# Patient Record
Sex: FEMALE | Race: WHITE | NOT HISPANIC OR LATINO | Employment: UNEMPLOYED | URBAN - METROPOLITAN AREA
[De-identification: names, ages, dates, MRNs, and addresses within clinical notes are randomized per-mention and may not be internally consistent; named-entity substitution may affect disease eponyms.]

---

## 2018-06-19 ENCOUNTER — HOSPITAL ENCOUNTER (EMERGENCY)
Facility: HOSPITAL | Age: 53
Discharge: HOME | End: 2018-06-20
Attending: EMERGENCY MEDICINE | Admitting: EMERGENCY MEDICINE

## 2018-06-19 DIAGNOSIS — N39.0 URINARY TRACT INFECTION IN FEMALE: Primary | ICD-10-CM

## 2018-06-19 DIAGNOSIS — R19.7 DIARRHEA, UNSPECIFIED TYPE: ICD-10-CM

## 2018-06-19 DIAGNOSIS — F20.9 SCHIZOPHRENIA, UNSPECIFIED TYPE (HCC): ICD-10-CM

## 2018-06-19 LAB
ALBUMIN SERPL-MCNC: 4.37 G/DL (ref 3.2–4.8)
ALBUMIN/GLOB SERPL: 1.5 G/DL (ref 1.5–2.5)
ALP SERPL-CCNC: 125 U/L (ref 25–100)
ALT SERPL W P-5'-P-CCNC: 17 U/L (ref 7–40)
ANION GAP SERPL CALCULATED.3IONS-SCNC: 9 MMOL/L (ref 3–11)
APAP SERPL-MCNC: <10 MCG/ML (ref 0–30)
AST SERPL-CCNC: 19 U/L (ref 0–33)
BASOPHILS # BLD AUTO: 0.06 10*3/MM3 (ref 0–0.2)
BASOPHILS NFR BLD AUTO: 0.4 % (ref 0–1)
BILIRUB SERPL-MCNC: 0.3 MG/DL (ref 0.3–1.2)
BUN BLD-MCNC: 15 MG/DL (ref 9–23)
BUN/CREAT SERPL: 16.7 (ref 7–25)
CALCIUM SPEC-SCNC: 9.5 MG/DL (ref 8.7–10.4)
CHLORIDE SERPL-SCNC: 107 MMOL/L (ref 99–109)
CO2 SERPL-SCNC: 24 MMOL/L (ref 20–31)
CREAT BLD-MCNC: 0.9 MG/DL (ref 0.6–1.3)
DEPRECATED RDW RBC AUTO: 43.9 FL (ref 37–54)
EOSINOPHIL # BLD AUTO: 0.13 10*3/MM3 (ref 0–0.3)
EOSINOPHIL NFR BLD AUTO: 0.8 % (ref 0–3)
ERYTHROCYTE [DISTWIDTH] IN BLOOD BY AUTOMATED COUNT: 13.4 % (ref 11.3–14.5)
ETHANOL BLD-MCNC: <10 MG/DL (ref 0–10)
GFR SERPL CREATININE-BSD FRML MDRD: 65 ML/MIN/1.73
GLOBULIN UR ELPH-MCNC: 2.8 GM/DL
GLUCOSE BLD-MCNC: 146 MG/DL (ref 70–100)
HCT VFR BLD AUTO: 42 % (ref 34.5–44)
HGB BLD-MCNC: 14 G/DL (ref 11.5–15.5)
IMM GRANULOCYTES # BLD: 0.04 10*3/MM3 (ref 0–0.03)
IMM GRANULOCYTES NFR BLD: 0.3 % (ref 0–0.6)
LIPASE SERPL-CCNC: 29 U/L (ref 6–51)
LYMPHOCYTES # BLD AUTO: 1.6 10*3/MM3 (ref 0.6–4.8)
LYMPHOCYTES NFR BLD AUTO: 10.4 % (ref 24–44)
MCH RBC QN AUTO: 29.7 PG (ref 27–31)
MCHC RBC AUTO-ENTMCNC: 33.3 G/DL (ref 32–36)
MCV RBC AUTO: 89 FL (ref 80–99)
MONOCYTES # BLD AUTO: 0.88 10*3/MM3 (ref 0–1)
MONOCYTES NFR BLD AUTO: 5.7 % (ref 0–12)
NEUTROPHILS # BLD AUTO: 12.67 10*3/MM3 (ref 1.5–8.3)
NEUTROPHILS NFR BLD AUTO: 82.4 % (ref 41–71)
PLATELET # BLD AUTO: 311 10*3/MM3 (ref 150–450)
PMV BLD AUTO: 10.2 FL (ref 6–12)
POTASSIUM BLD-SCNC: 3.8 MMOL/L (ref 3.5–5.5)
PROT SERPL-MCNC: 7.2 G/DL (ref 5.7–8.2)
RBC # BLD AUTO: 4.72 10*6/MM3 (ref 3.89–5.14)
SALICYLATES SERPL-MCNC: <1 MG/DL (ref 0–29)
SODIUM BLD-SCNC: 140 MMOL/L (ref 132–146)
WBC NRBC COR # BLD: 15.38 10*3/MM3 (ref 3.5–10.8)

## 2018-06-19 PROCEDURE — 80307 DRUG TEST PRSMV CHEM ANLYZR: CPT | Performed by: NURSE PRACTITIONER

## 2018-06-19 PROCEDURE — 85025 COMPLETE CBC W/AUTO DIFF WBC: CPT | Performed by: NURSE PRACTITIONER

## 2018-06-19 PROCEDURE — 96360 HYDRATION IV INFUSION INIT: CPT

## 2018-06-19 PROCEDURE — 80306 DRUG TEST PRSMV INSTRMNT: CPT | Performed by: NURSE PRACTITIONER

## 2018-06-19 PROCEDURE — 81001 URINALYSIS AUTO W/SCOPE: CPT | Performed by: NURSE PRACTITIONER

## 2018-06-19 PROCEDURE — 80053 COMPREHEN METABOLIC PANEL: CPT | Performed by: NURSE PRACTITIONER

## 2018-06-19 PROCEDURE — 99283 EMERGENCY DEPT VISIT LOW MDM: CPT

## 2018-06-19 PROCEDURE — 83690 ASSAY OF LIPASE: CPT | Performed by: NURSE PRACTITIONER

## 2018-06-19 RX ORDER — SODIUM CHLORIDE 0.9 % (FLUSH) 0.9 %
10 SYRINGE (ML) INJECTION AS NEEDED
Status: DISCONTINUED | OUTPATIENT
Start: 2018-06-19 | End: 2018-06-20 | Stop reason: HOSPADM

## 2018-06-19 RX ADMIN — SODIUM CHLORIDE 1000 ML: 9 INJECTION, SOLUTION INTRAVENOUS at 22:25

## 2018-06-20 VITALS
TEMPERATURE: 98.2 F | DIASTOLIC BLOOD PRESSURE: 58 MMHG | BODY MASS INDEX: 27.28 KG/M2 | OXYGEN SATURATION: 95 % | WEIGHT: 180 LBS | RESPIRATION RATE: 16 BRPM | HEART RATE: 78 BPM | SYSTOLIC BLOOD PRESSURE: 161 MMHG | HEIGHT: 68 IN

## 2018-06-20 LAB
AMPHET+METHAMPHET UR QL: NEGATIVE
AMPHETAMINES UR QL: NEGATIVE
BACTERIA UR QL AUTO: ABNORMAL /HPF
BARBITURATES UR QL SCN: NEGATIVE
BENZODIAZ UR QL SCN: NEGATIVE
BILIRUB UR QL STRIP: ABNORMAL
BUPRENORPHINE SERPL-MCNC: NEGATIVE NG/ML
CANNABINOIDS SERPL QL: NEGATIVE
CLARITY UR: ABNORMAL
COCAINE UR QL: NEGATIVE
COLOR UR: ABNORMAL
GLUCOSE UR STRIP-MCNC: NEGATIVE MG/DL
HGB UR QL STRIP.AUTO: NEGATIVE
HYALINE CASTS UR QL AUTO: ABNORMAL /LPF
KETONES UR QL STRIP: ABNORMAL
LEUKOCYTE ESTERASE UR QL STRIP.AUTO: ABNORMAL
METHADONE UR QL SCN: NEGATIVE
NITRITE UR QL STRIP: NEGATIVE
OPIATES UR QL: NEGATIVE
OXYCODONE UR QL SCN: NEGATIVE
PCP UR QL SCN: NEGATIVE
PH UR STRIP.AUTO: <=5 [PH] (ref 5–8)
PROPOXYPH UR QL: NEGATIVE
PROT UR QL STRIP: ABNORMAL
RBC # UR: ABNORMAL /HPF
REF LAB TEST METHOD: ABNORMAL
SP GR UR STRIP: 1.03 (ref 1–1.03)
SQUAMOUS #/AREA URNS HPF: ABNORMAL /HPF
TRICYCLICS UR QL SCN: NEGATIVE
UROBILINOGEN UR QL STRIP: ABNORMAL
WBC UR QL AUTO: ABNORMAL /HPF

## 2018-06-20 RX ORDER — CEFDINIR 300 MG/1
300 CAPSULE ORAL 2 TIMES DAILY
Qty: 20 CAPSULE | Refills: 0 | Status: SHIPPED | OUTPATIENT
Start: 2018-06-20 | End: 2019-01-29

## 2018-06-20 NOTE — ED PROVIDER NOTES
"Subjective   Ms. Keyla Strong is a 53 y.o. female who presents to the ED with c/o diarrhea onset today. Pt reports earlier today she had one episode of \"severe\" diarrhea and for the past month she has experienced frequent confusion consisting of \"seeing things floating and hearing things abnormally\". She states the confusion sx are secondary to medication change about 1 month ago by Dr. Murillo. The medication change consisted of replacement of Risperdal with Trileptal for treatment of Schizophrenia. She has f/u with Dr. Murillo scheduled for tomorrow at 1500. Additionally, pt states she had similar sx about 1 month ago and reported to [facility] where she was given an injection that \"cleared her head\".  She also complains of intermittent nausea, however she denies suicidal ideation, homicidal ideation, abd pain, vomiting, and any other acute sx at this time.           History provided by:  Patient  Diarrhea   The primary symptoms include nausea and diarrhea. Primary symptoms do not include fever, abdominal pain or vomiting. The illness began today. The onset was sudden. The problem has been resolved.   The illness does not include chills.       Review of Systems   Constitutional: Negative for chills and fever.   Respiratory: Negative for cough and shortness of breath.    Cardiovascular: Negative for chest pain.   Gastrointestinal: Positive for diarrhea and nausea. Negative for abdominal pain and vomiting.   Psychiatric/Behavioral: Positive for confusion. Negative for suicidal ideas.   All other systems reviewed and are negative.      No past medical history on file.    No Known Allergies    No past surgical history on file.    No family history on file.    Social History     Social History   • Marital status: Single     Social History Main Topics   • Drug use: Unknown     Other Topics Concern   • Not on file         Objective   Physical Exam   Constitutional: She is oriented to person, place, and time. She " appears well-developed and well-nourished.   Pt is sitting up in the bed at this time.  Pt is anxious.  Pt is cooperative with exam.    HENT:   Head: Normocephalic and atraumatic.   Right Ear: External ear normal.   Left Ear: External ear normal.   Mouth/Throat: Oropharynx is clear and moist.   Eyes: Conjunctivae and EOM are normal.   Neck: Normal range of motion.   Cardiovascular: Tachycardia present.    Pulmonary/Chest: Effort normal and breath sounds normal. No respiratory distress.   Abdominal: Soft. Bowel sounds are normal. She exhibits no distension. There is no tenderness.   Musculoskeletal: Normal range of motion.   Neurological: She is alert and oriented to person, place, and time.   Skin: Skin is warm and dry.   Psychiatric: Her mood appears anxious. She expresses no homicidal and no suicidal ideation. She expresses no suicidal plans and no homicidal plans.   Nursing note and vitals reviewed.      Procedures         ED Course  ED Course as of Jun 20 0128   Wed Jun 20, 2018   0123 [facility] results were reviewed.  Pt went to [facility] over a month ago, when she was out of her Risperdal. Pt at this time was given a Geodon injection.   I discussed this patient's results at this time.  Patient has a urinary tract infection.  Patient will be placed on Omnicef 300 mg twice a day.  Patient has an appointment to see her psychiatrist tomorrow at 3 PM.  I advised the patient of the injection that she was given at [facility] however we are not going to administer that tonight.  Patient will follow up with her physician tomorrow for any med changes.  Patient agrees with treatment plan and verbalizes understanding.  [KG]      ED Course User Index  [KG] JENNA Davis       Recent Results (from the past 24 hour(s))   Comprehensive Metabolic Panel    Collection Time: 06/19/18 10:21 PM   Result Value Ref Range    Glucose 146 (H) 70 - 100 mg/dL    BUN 15 9 - 23 mg/dL    Creatinine 0.90 0.60 - 1.30 mg/dL    Sodium  140 132 - 146 mmol/L    Potassium 3.8 3.5 - 5.5 mmol/L    Chloride 107 99 - 109 mmol/L    CO2 24.0 20.0 - 31.0 mmol/L    Calcium 9.5 8.7 - 10.4 mg/dL    Total Protein 7.2 5.7 - 8.2 g/dL    Albumin 4.37 3.20 - 4.80 g/dL    ALT (SGPT) 17 7 - 40 U/L    AST (SGOT) 19 0 - 33 U/L    Alkaline Phosphatase 125 (H) 25 - 100 U/L    Total Bilirubin 0.3 0.3 - 1.2 mg/dL    eGFR Non African Amer 65 >60 mL/min/1.73    Globulin 2.8 gm/dL    A/G Ratio 1.5 1.5 - 2.5 g/dL    BUN/Creatinine Ratio 16.7 7.0 - 25.0    Anion Gap 9.0 3.0 - 11.0 mmol/L   Lipase    Collection Time: 06/19/18 10:21 PM   Result Value Ref Range    Lipase 29 6 - 51 U/L   Salicylate Level    Collection Time: 06/19/18 10:21 PM   Result Value Ref Range    Salicylate <1.0 0.0 - 29.0 mg/dL   Acetaminophen Level    Collection Time: 06/19/18 10:21 PM   Result Value Ref Range    Acetaminophen <10.0 0.0 - 30.0 mcg/mL   Ethanol    Collection Time: 06/19/18 10:21 PM   Result Value Ref Range    Ethanol <10 0 - 10 mg/dL   CBC Auto Differential    Collection Time: 06/19/18 10:21 PM   Result Value Ref Range    WBC 15.38 (H) 3.50 - 10.80 10*3/mm3    RBC 4.72 3.89 - 5.14 10*6/mm3    Hemoglobin 14.0 11.5 - 15.5 g/dL    Hematocrit 42.0 34.5 - 44.0 %    MCV 89.0 80.0 - 99.0 fL    MCH 29.7 27.0 - 31.0 pg    MCHC 33.3 32.0 - 36.0 g/dL    RDW 13.4 11.3 - 14.5 %    RDW-SD 43.9 37.0 - 54.0 fl    MPV 10.2 6.0 - 12.0 fL    Platelets 311 150 - 450 10*3/mm3    Neutrophil % 82.4 (H) 41.0 - 71.0 %    Lymphocyte % 10.4 (L) 24.0 - 44.0 %    Monocyte % 5.7 0.0 - 12.0 %    Eosinophil % 0.8 0.0 - 3.0 %    Basophil % 0.4 0.0 - 1.0 %    Immature Grans % 0.3 0.0 - 0.6 %    Neutrophils, Absolute 12.67 (H) 1.50 - 8.30 10*3/mm3    Lymphocytes, Absolute 1.60 0.60 - 4.80 10*3/mm3    Monocytes, Absolute 0.88 0.00 - 1.00 10*3/mm3    Eosinophils, Absolute 0.13 0.00 - 0.30 10*3/mm3    Basophils, Absolute 0.06 0.00 - 0.20 10*3/mm3    Immature Grans, Absolute 0.04 (H) 0.00 - 0.03 10*3/mm3   Urinalysis With /  Microscopic If Indicated (No Culture) - Urine, Clean Catch    Collection Time: 06/19/18 11:59 PM   Result Value Ref Range    Color, UA Dark Yellow (A) Yellow, Straw    Appearance, UA Cloudy (A) Clear    pH, UA <=5.0 5.0 - 8.0    Specific Gravity, UA 1.027 1.001 - 1.030    Glucose, UA Negative Negative    Ketones, UA 15 mg/dL (1+) (A) Negative    Bilirubin, UA Small (1+) (A) Negative    Blood, UA Negative Negative    Protein, UA 30 mg/dL (1+) (A) Negative    Leuk Esterase, UA Moderate (2+) (A) Negative    Nitrite, UA Negative Negative    Urobilinogen, UA 1.0 E.U./dL 0.2 - 1.0 E.U./dL   Urine Drug Screen - Urine, Clean Catch    Collection Time: 06/19/18 11:59 PM   Result Value Ref Range    THC, Screen, Urine Negative Negative    Phencyclidine (PCP), Urine Negative Negative    Cocaine Screen, Urine Negative Negative    Methamphetamine, Urine Negative Negative    Opiate Screen Negative Negative    Amphetamine Screen, Urine Negative Negative    Benzodiazepine Screen, Urine Negative Negative    Tricyclic Antidepressants Screen Negative Negative    Methadone Screen, Urine Negative Negative    Barbiturates Screen, Urine Negative Negative    Oxycodone Screen, Urine Negative Negative    Propoxyphene Screen Negative Negative    Buprenorphine, Screen, Urine Negative Negative   Urinalysis, Microscopic Only - Urine, Clean Catch    Collection Time: 06/19/18 11:59 PM   Result Value Ref Range    RBC, UA 3-6 (A) None Seen, 0-2 /HPF    WBC, UA Too Numerous to Count (A) None Seen, 0-2 /HPF    Bacteria, UA 4+ (A) None Seen, Trace /HPF    Squamous Epithelial Cells, UA 13-20 (A) None Seen, 0-2 /HPF    Hyaline Casts, UA None Seen 0 - 6 /LPF    Methodology Manual Light Microscopy      Note: In addition to lab results from this visit, the labs listed above may include labs taken at another facility or during a different encounter within the last 24 hours. Please correlate lab times with ED admission and discharge times for further  clarification of the services performed during this visit.    No orders to display     Vitals:    06/19/18 2124 06/19/18 2357 06/20/18 0050 06/20/18 0057   BP: 112/76 161/58     BP Location:       Patient Position:       Pulse: 110 69 77 78   Resp: 16      Temp:       TempSrc:       SpO2: 95% 97% 94% 95%   Weight:       Height:         Medications   sodium chloride 0.9 % flush 10 mL (not administered)   sodium chloride 0.9 % bolus 1,000 mL (1,000 mL Intravenous New Bag 6/19/18 1692)     ECG/EMG Results (last 24 hours)     ** No results found for the last 24 hours. **                      MDM    Final diagnoses:   Urinary tract infection in female   Diarrhea, unspecified type   Schizophrenia, unspecified type       Documentation assistance provided by dennis Angeles.  Information recorded by the jessiibayde was done at my direction and has been verified and validated by me.     Anibal Angeles  06/19/18 2137       JENNA Davis  06/20/18 0128       JENNA Davis  06/20/18 0128

## 2019-01-29 ENCOUNTER — OFFICE VISIT (OUTPATIENT)
Dept: FAMILY MEDICINE CLINIC | Facility: CLINIC | Age: 54
End: 2019-01-29

## 2019-01-29 VITALS
TEMPERATURE: 98.8 F | SYSTOLIC BLOOD PRESSURE: 98 MMHG | DIASTOLIC BLOOD PRESSURE: 64 MMHG | HEART RATE: 99 BPM | BODY MASS INDEX: 27.74 KG/M2 | OXYGEN SATURATION: 98 % | HEIGHT: 68 IN | WEIGHT: 183 LBS | RESPIRATION RATE: 16 BRPM

## 2019-01-29 DIAGNOSIS — Z00.00 ENCOUNTER FOR MEDICAL EXAMINATION TO ESTABLISH CARE: Primary | ICD-10-CM

## 2019-01-29 DIAGNOSIS — Z12.4 CERVICAL CANCER SCREENING: ICD-10-CM

## 2019-01-29 DIAGNOSIS — Z12.31 SCREENING MAMMOGRAM, ENCOUNTER FOR: ICD-10-CM

## 2019-01-29 DIAGNOSIS — F41.9 ANXIETY: ICD-10-CM

## 2019-01-29 DIAGNOSIS — L40.9 PSORIASIS: ICD-10-CM

## 2019-01-29 DIAGNOSIS — Z12.11 SCREEN FOR COLON CANCER: ICD-10-CM

## 2019-01-29 PROCEDURE — 99204 OFFICE O/P NEW MOD 45 MIN: CPT | Performed by: NURSE PRACTITIONER

## 2019-01-29 RX ORDER — OXCARBAZEPINE 300 MG/1
TABLET, FILM COATED ORAL
COMMUNITY
Start: 2019-01-01 | End: 2019-01-29

## 2019-01-29 RX ORDER — HYDROXYZINE PAMOATE 50 MG/1
CAPSULE ORAL
COMMUNITY
Start: 2019-01-08

## 2019-01-29 RX ORDER — QUETIAPINE FUMARATE 100 MG/1
TABLET, FILM COATED ORAL
COMMUNITY
Start: 2019-01-01

## 2019-01-29 RX ORDER — BENZTROPINE MESYLATE 1 MG/1
TABLET ORAL
COMMUNITY
Start: 2019-01-08 | End: 2019-01-29

## 2019-02-02 NOTE — PATIENT INSTRUCTIONS
Psoriasis  Psoriasis is a long-term (chronic) skin condition. It causes raised, red patches (plaques) on your skin that look silvery. The red patches may show up anywhere on your body. They can be any size or shape.  Psoriasis can come and go. It can range from mild to very bad. It cannot be passed from one person to another (not contagious). There is no cure for this condition, but it can be helped with treatment.  Follow these instructions at home:  Skin Care  · Apply moisturizers to your skin as needed. Only use those that your doctor has said are okay.  · Apply cool compresses to the affected areas.  · Do not scratch your skin.  Lifestyle    · Do not use tobacco products. This includes cigarettes, chewing tobacco, and e-cigarettes. If you need help quitting, ask your doctor.  · Drink little or no alcohol.  · Try to lower your stress. Meditation or yoga may help.  · Get sun as told by your doctor. Do not get sunburned.  · Think about joining a psoriasis support group.  Medicines  · Take or use over-the-counter and prescription medicines only as told by your doctor.  · If you were prescribed an antibiotic, take or use it as told by your doctor. Do not stop taking the antibiotic even if your condition starts to get better.  General instructions  · Keep a journal. Use this to help track what triggers an outbreak. Try to avoid any triggers.  · See a counselor or  if you feel very sad, upset, or hopeless about your condition and these feelings affect your work or relationships.  · Keep all follow-up visits as told by your doctor. This is important.  Contact a doctor if:  · Your pain gets worse.  · You have more redness or warmth in the affected areas.  · You have new pain or stiffness in your joints.  · Your pain or stiffness in your joints gets worse.  · Your nails start to break easily.  · Your nails pull away from the nail bed easily.  · You have a fever.  · You feel very sad (depressed).  This  information is not intended to replace advice given to you by your health care provider. Make sure you discuss any questions you have with your health care provider.  Document Released: 01/25/2006 Document Revised: 05/25/2017 Document Reviewed: 05/04/2016  Elsevier Interactive Patient Education © 2018 [employer]

## 2019-03-04 PROBLEM — Z01.419 WELL WOMAN EXAM: Status: ACTIVE | Noted: 2019-03-04

## 2021-08-14 ENCOUNTER — APPOINTMENT (OUTPATIENT)
Dept: GENERAL RADIOLOGY | Facility: HOSPITAL | Age: 56
End: 2021-08-14

## 2021-08-14 ENCOUNTER — HOSPITAL ENCOUNTER (EMERGENCY)
Facility: HOSPITAL | Age: 56
Discharge: HOME | End: 2021-08-14
Attending: EMERGENCY MEDICINE | Admitting: EMERGENCY MEDICINE

## 2021-08-14 VITALS
SYSTOLIC BLOOD PRESSURE: 159 MMHG | RESPIRATION RATE: 14 BRPM | WEIGHT: 160 LBS | OXYGEN SATURATION: 99 % | BODY MASS INDEX: 24.25 KG/M2 | HEIGHT: 68 IN | HEART RATE: 87 BPM | TEMPERATURE: 96.8 F | DIASTOLIC BLOOD PRESSURE: 83 MMHG

## 2021-08-14 DIAGNOSIS — S93.601A RIGHT FOOT SPRAIN, INITIAL ENCOUNTER: Primary | ICD-10-CM

## 2021-08-14 PROCEDURE — 73630 X-RAY EXAM OF FOOT: CPT

## 2021-08-14 PROCEDURE — 99283 EMERGENCY DEPT VISIT LOW MDM: CPT

## 2021-08-14 RX ORDER — ACETAMINOPHEN 500 MG
1000 TABLET ORAL ONCE
Status: COMPLETED | OUTPATIENT
Start: 2021-08-14 | End: 2021-08-14

## 2021-08-14 RX ADMIN — ACETAMINOPHEN 1000 MG: 500 TABLET, FILM COATED ORAL at 04:14

## 2021-08-14 NOTE — ED PROVIDER NOTES
Subjective   56-year-old female presents for evaluation of right foot pain.  Of note, the patient is homeless.  She states that 1 week ago she accidentally tripped and fell and injured her right foot.  She has been experiencing pain in her right foot since that time.  Tonight, she tripped once again and states that she reinjured her right foot.  As result, she called EMS and was brought to our facility.  Isolated injury.  No paresthesias.  No ankle pain.  She has no other complaints at this time.  She is able to bear weight.  Pain is currently 5 out of 10 in severity.          Review of Systems   Musculoskeletal:        Right foot pain   All other systems reviewed and are negative.      Past Medical History:   Diagnosis Date   • Anxiety    • Obesity    • Psoriasis        No Known Allergies    Past Surgical History:   Procedure Laterality Date   • NO PAST SURGERIES         Family History   Problem Relation Age of Onset   • Lymphoma Mother         mom passed at 82 with lymphoma   • Heart attack Father         dad passed at 63 with a heart attack   • No Known Problems Sister    • No Known Problems Brother    • No Known Problems Daughter        Social History     Socioeconomic History   • Marital status: Single     Spouse name: Not on file   • Number of children: Not on file   • Years of education: Not on file   • Highest education level: Not on file   Tobacco Use   • Smoking status: Current Every Day Smoker     Packs/day: 0.50     Years: 38.00     Pack years: 19.00     Types: Cigarettes     Start date: 1/1/1981   • Smokeless tobacco: Never Used   Substance and Sexual Activity   • Alcohol use: No   • Drug use: No   • Sexual activity: Not Currently           Objective   Physical Exam  Vitals and nursing note reviewed.   Constitutional:       General: She is not in acute distress.     Appearance: Normal appearance. She is well-developed. She is not diaphoretic.      Comments: Nontoxic-appearing female   HENT:      Head:  Normocephalic and atraumatic.   Cardiovascular:      Rate and Rhythm: Normal rate and regular rhythm.      Pulses: Normal pulses.      Heart sounds: Normal heart sounds. No murmur heard.   No friction rub. No gallop.    Pulmonary:      Effort: Pulmonary effort is normal. No respiratory distress.      Breath sounds: Normal breath sounds. No wheezing or rales.   Musculoskeletal:         General: Normal range of motion.      Comments: No soft tissue deformity or soft tissue swelling noted to right foot or ankle   Skin:     General: Skin is warm and dry.      Findings: No erythema or rash.   Neurological:      General: No focal deficit present.      Mental Status: She is alert and oriented to person, place, and time.      Comments: Normal gait, neurovascularly intact distally in right lower extremity with bounding distal pulses and normal sensation   Psychiatric:         Mood and Affect: Mood normal.         Thought Content: Thought content normal.         Judgment: Judgment normal.         Procedures           ED Course  ED Course as of Aug 14 0641   Sat Aug 14, 2021   0640 56-year-old female presents for evaluation of right foot pain.  On arrival to the ED, the patient is nontoxic-appearing.  Benign exam.  Plain films are negative.  Pain control provided.  Patient is weightbearing.  She is neurovascularly intact.  Ambulatory without difficulty.  Reassured and counseled regarding symptomatic management.  She will follow-up with her primary care physician within the next week.  Agreeable with plan and given appropriate strict return precautions.    [DD]   0640 I personally viewed the patient's x-ray images myself, and I am in agreement with the radiologist's reading for final interpretation.        [DD]      ED Course User Index  [DD] Marcus Mccoy MD                                   No results found for this or any previous visit (from the past 24 hour(s)).  Note: In addition to lab results from this visit, the  "labs listed above may include labs taken at another facility or during a different encounter within the last 24 hours. Please correlate lab times with ED admission and discharge times for further clarification of the services performed during this visit.    XR Foot 3+ View Right   Final Result   Negative right foot.      Signer Name: Joo Oseguera MD    Signed: 8/14/2021 3:20 AM    Workstation Name: [id number]     [facility]        Vitals:    08/14/21 0150   BP: 159/83   Pulse: 87   Resp: 14   Temp: 96.8 °F (36 °C)   SpO2: 99%   Weight: 72.6 kg (160 lb)   Height: 172.7 cm (68\")     Medications   acetaminophen (TYLENOL) tablet 1,000 mg (1,000 mg Oral Given 8/14/21 0414)     ECG/EMG Results (last 24 hours)     ** No results found for the last 24 hours. **        No orders to display               MDM    Final diagnoses:   Right foot sprain, initial encounter       ED Disposition  ED Disposition     ED Disposition Condition Comment    Discharge Stable           Janessa Mccall, APRN  [address]  [address] [place], [place] [id number] [phone]    In 1 week           Medication List      No changes were made to your prescriptions during this visit.          Marcus Mccoy MD  08/14/21 0614    "

## 2021-08-29 ENCOUNTER — HOSPITAL ENCOUNTER (EMERGENCY)
Facility: HOSPITAL | Age: 56
Discharge: LEFT BEFORE DISCHARGE | End: 2021-08-29

## 2021-08-29 PROCEDURE — 99211 OFF/OP EST MAY X REQ PHY/QHP: CPT

## 2021-11-05 ENCOUNTER — APPOINTMENT (OUTPATIENT)
Dept: CT IMAGING | Facility: HOSPITAL | Age: 56
End: 2021-11-05

## 2021-11-05 ENCOUNTER — HOSPITAL ENCOUNTER (EMERGENCY)
Facility: HOSPITAL | Age: 56
Discharge: HOME | End: 2021-11-05
Attending: EMERGENCY MEDICINE | Admitting: EMERGENCY MEDICINE

## 2021-11-05 VITALS
RESPIRATION RATE: 18 BRPM | HEIGHT: 68 IN | DIASTOLIC BLOOD PRESSURE: 90 MMHG | TEMPERATURE: 97.8 F | HEART RATE: 81 BPM | BODY MASS INDEX: 23.19 KG/M2 | OXYGEN SATURATION: 100 % | WEIGHT: 153 LBS | SYSTOLIC BLOOD PRESSURE: 157 MMHG

## 2021-11-05 DIAGNOSIS — R51.9 ACUTE NONINTRACTABLE HEADACHE, UNSPECIFIED HEADACHE TYPE: Primary | ICD-10-CM

## 2021-11-05 PROCEDURE — 99283 EMERGENCY DEPT VISIT LOW MDM: CPT

## 2021-11-05 PROCEDURE — 70450 CT HEAD/BRAIN W/O DYE: CPT

## 2021-11-05 RX ORDER — ACETAMINOPHEN 500 MG
1000 TABLET ORAL ONCE
Status: DISCONTINUED | OUTPATIENT
Start: 2021-11-05 | End: 2021-11-05 | Stop reason: HOSPADM

## 2021-11-05 NOTE — ED PROVIDER NOTES
[place]    EMERGENCY DEPARTMENT ENCOUNTER      Pt Name: Keyla Strong  MRN: [id number]  YOB: 1965  Date of evaluation: 11/5/2021  Provider: Fran Leslie MD    CHIEF COMPLAINT       Chief Complaint   Patient presents with   • Headache         HISTORY OF PRESENT ILLNESS  (Location/Symptom, Timing/Onset, Context/Setting, Quality, Duration, Modifying Factors, Severity.)   Keyla Strong is a 56 y.o. female who presents to the emergency department with mild aching bifrontal headache over the course the past day without any modifying factors. This was gradual in onset and there is no associated neck pain or stiffness. She has no personal or family history of cerebral aneurysm. She denies any associated recent illness, fever, chills, neck pain or stiffness, visual changes, weakness, numbness, paresthesia, or difficulty ambulating. She has long history of the same headache and states that this 1 feels the same.       Nursing notes were reviewed.    REVIEW OF SYSTEMS    (2-9 systems for level 4, 10 or more for level 5)   ROS:  General:  No fevers, no chills, no weakness  Cardiovascular:  No chest pain, no palpitations  Respiratory:  No shortness of breath, no cough, no wheezing  Gastrointestinal:  No pain, no nausea, no vomiting, no diarrhea  Musculoskeletal:  No muscle pain, no joint pain  Skin:  No rash  Neurologic: Headache  Psychiatric:  No anxiety  Genitourinary:  No dysuria, no hematuria    Except as noted above the remainder of the review of systems was reviewed and negative.       PAST MEDICAL HISTORY     Past Medical History:   Diagnosis Date   • Anxiety    • Depression    • Migraine    • Obesity    • Psoriasis          SURGICAL HISTORY       Past Surgical History:   Procedure Laterality Date   • NO PAST SURGERIES           CURRENT MEDICATIONS     No current facility-administered medications for this encounter.    Current Outpatient Medications:   •  hydrOXYzine pamoate (VISTARIL) 50 MG  "capsule, , Disp: , Rfl:   •  QUEtiapine (SEROquel) 100 MG tablet, , Disp: , Rfl:   •  triamcinolone (KENALOG) 0.1 % ointment, Apply  topically to the appropriate area as directed 2 (Two) Times a Day., Disp: 30 tube, Rfl: 5    ALLERGIES     Patient has no known allergies.    FAMILY HISTORY       Family History   Problem Relation Age of Onset   • Lymphoma Mother         mom passed at 82 with lymphoma   • Heart attack Father         dad passed at 63 with a heart attack   • No Known Problems Sister    • No Known Problems Brother    • No Known Problems Daughter           SOCIAL HISTORY       Social History     Socioeconomic History   • Marital status: Single   Tobacco Use   • Smoking status: Current Every Day Smoker     Packs/day: 0.50     Years: 38.00     Pack years: 19.00     Types: Cigarettes     Start date: 1/1/1981   • Smokeless tobacco: Never Used   Substance and Sexual Activity   • Alcohol use: No   • Drug use: No   • Sexual activity: Not Currently         PHYSICAL EXAM    (up to 7 for level 4, 8 or more for level 5)     Vitals:    11/05/21 0050 11/05/21 0100   BP: 173/94 157/90   Pulse: 81    Resp: 18    Temp: 97.8 °F (36.6 °C)    TempSrc: Oral    SpO2: 100% 100%   Weight: 69.4 kg (153 lb)    Height: 172.7 cm (68\")        Physical Exam  General: Awake, alert, no acute distress.  HEENT: Conjunctivae normal.  Neck: Trachea midline.  Cardiac: Heart regular rate, rhythm, no murmurs, rubs, or gallops  Lungs: Lungs are clear to auscultation, there is no wheezing, rhonchi, or rales. There is no use of accessory muscles.  Chest wall: There is no tenderness to palpation over the chest wall or over ribs  Abdomen: Abdomen is soft, nontender, nondistended. There are no firm or pulsatile masses, no rebound rigidity or guarding.   Musculoskeletal: No deformity.  Neuro: Alert and oriented x4.  Cranial nerves II through XII are grossly intact.  There are no visual field deficits.  Cerebellar function intact with finger-to-nose " "and heel-to-shin testing.  Patient observed ambulating by myself and there is no evidence of ataxia or other gait abnormality.  Motor strength is intact in the face and strength is 5 out of 5 in all 4 extremities without any asymmetry.  Sensation to light touch is intact in all 4 extremities without any asymmetry.  Dermatology: Skin is warm and dry  Psych: Mentation is grossly normal, cognition is grossly normal. Affect is appropriate.        DIAGNOSTIC RESULTS   RADIOLOGY:   Non-plain film images such as CT, Ultrasound and MRI are read by the radiologist. Plain radiographic images are visualized and preliminarily interpreted by the emergency physician with the below findings:      [x] Radiologist's Report Reviewed:  CT Head Without Contrast   Final Result   Senescent changes without acute abnormality.      Signer Name: Joo Mclaughlin MD    Signed: 11/5/2021 1:36 AM    Workstation Name: [email or web address]     [facility]              EMERGENCY DEPARTMENT COURSE and DIFFERENTIAL DIAGNOSIS/MDM:   Vitals:    Vitals:    11/05/21 0050 11/05/21 0100   BP: 173/94 157/90   Pulse: 81    Resp: 18    Temp: 97.8 °F (36.6 °C)    TempSrc: Oral    SpO2: 100% 100%   Weight: 69.4 kg (153 lb)    Height: 172.7 cm (68\")        ED Course as of 11/17/21 0455   Fri Nov 05, 2021   0149 Patient's presentation is consistent with benign cause of headache.  Patient is overall well-appearing with normal vital signs including no evidence of fever, and with a completely normal neurologic exam as noted above.  There is no alteration in mental status, focal deficit, fever, or nuchal rigidity to suggest CNS infection.  This was not a thunderclap headache again with no nuchal rigidity or neurologic deficit and no risk factors to suggest subarachnoid hemorrhage.  There are no visual changes or specific eye pain to suggest acute angle-closure glaucoma.  There are no significant risk factors or findings that would suggest that this " patient has CVT, cervical artery dissection, intracranial mass, idiopathic intracranial hypertension, preeclampsia, or carbon monoxide exposure I do not feel that imaging for this patient is warranted at this point.  Patient's symptoms were successfully treated with conservative measures in the emergency department and they will be discharged to follow-up closely with her primary care provider and/or neurologist.     [NS]      ED Course User Index  [NS] Fran Leslie MD     I had a discussion with the patient/family regarding diagnosis, diagnostic results, treatment plan, and medications.  The patient/family indicated understanding of these instructions.  I spent adequate time at the bedside preceding discharge necessary to personally discuss the aftercare instructions, giving patient education, providing explanations of the results of our evaluations/findings, and my decision making to assure that the patient/family understand the plan of care.  Time was allotted to answer questions at that time and throughout the ED course.  Emphasis was placed on timely follow-up after discharge.  I also discussed the potential for the development of an acute emergent condition requiring further evaluation, admission, or even surgical intervention. I discussed that we found nothing during the visit today indicating the need for further workup, admission, or the presence of an unstable medical condition.  I encouraged the patient to return to the emergency department immediately for ANY concerns, worsening, new complaints, or if symptoms persist and unable to seek follow-up in a timely fashion.  The patient/family expressed understanding and agreement with this plan.  The patient will follow-up with their PCP in 1-2 days for reevaluation.         FINAL IMPRESSION      1. Acute nonintractable headache, unspecified headache type          DISPOSITION/PLAN     ED Disposition     ED Disposition Condition Comment    Discharge  Stable           PATIENT REFERRED TO:  Janessa Mccall, APRN  [address]  [address]  [place] [address]  [phone]    Schedule an appointment as soon as possible for a visit in 2 days      [facility]  [address] [facility] [address] [phone]    If symptoms worsen      DISCHARGE MEDICATIONS:     Medication List      CONTINUE taking these medications    hydrOXYzine pamoate 50 MG capsule  Commonly known as: VISTARIL     QUEtiapine 100 MG tablet  Commonly known as: SEROquel     triamcinolone 0.1 % ointment  Commonly known as: KENALOG  Apply  topically to the appropriate area as directed 2 (Two) Times a Day.                Comment: Please note this report has been produced using speech recognition software.      Fran Leslie MD  Attending Emergency Physician               Fran Leslie MD  11/17/21 2149

## 2021-11-22 ENCOUNTER — HOSPITAL ENCOUNTER (EMERGENCY)
Facility: HOSPITAL | Age: 56
Discharge: HOME | End: 2021-11-23
Attending: EMERGENCY MEDICINE

## 2021-11-22 DIAGNOSIS — Z87.891 HISTORY OF SMOKING: ICD-10-CM

## 2021-11-22 DIAGNOSIS — R06.02 SHORTNESS OF BREATH: Primary | ICD-10-CM

## 2021-11-22 DIAGNOSIS — J44.9 CHRONIC OBSTRUCTIVE PULMONARY DISEASE, UNSPECIFIED COPD TYPE (HCC): ICD-10-CM

## 2021-11-22 PROCEDURE — 99283 EMERGENCY DEPT VISIT LOW MDM: CPT

## 2021-11-22 PROCEDURE — 93005 ELECTROCARDIOGRAM TRACING: CPT

## 2021-11-22 RX ORDER — SODIUM CHLORIDE 0.9 % (FLUSH) 0.9 %
10 SYRINGE (ML) INJECTION AS NEEDED
Status: DISCONTINUED | OUTPATIENT
Start: 2021-11-22 | End: 2021-11-23 | Stop reason: HOSPADM

## 2021-11-23 ENCOUNTER — APPOINTMENT (OUTPATIENT)
Dept: GENERAL RADIOLOGY | Facility: HOSPITAL | Age: 56
End: 2021-11-23

## 2021-11-23 ENCOUNTER — HOSPITAL ENCOUNTER (EMERGENCY)
Facility: HOSPITAL | Age: 56
Discharge: HOME | End: 2021-11-23
Attending: STUDENT IN AN ORGANIZED HEALTH CARE EDUCATION/TRAINING PROGRAM | Admitting: STUDENT IN AN ORGANIZED HEALTH CARE EDUCATION/TRAINING PROGRAM

## 2021-11-23 ENCOUNTER — TELEPHONE (OUTPATIENT)
Dept: TELEMETRY | Facility: HOSPITAL | Age: 56
End: 2021-11-23

## 2021-11-23 VITALS
SYSTOLIC BLOOD PRESSURE: 116 MMHG | OXYGEN SATURATION: 98 % | TEMPERATURE: 97.3 F | RESPIRATION RATE: 18 BRPM | HEART RATE: 86 BPM | WEIGHT: 156 LBS | HEIGHT: 68 IN | BODY MASS INDEX: 23.64 KG/M2 | DIASTOLIC BLOOD PRESSURE: 61 MMHG

## 2021-11-23 VITALS
SYSTOLIC BLOOD PRESSURE: 104 MMHG | BODY MASS INDEX: 22.73 KG/M2 | HEIGHT: 68 IN | OXYGEN SATURATION: 94 % | TEMPERATURE: 97.7 F | RESPIRATION RATE: 18 BRPM | HEART RATE: 78 BPM | WEIGHT: 150 LBS | DIASTOLIC BLOOD PRESSURE: 61 MMHG

## 2021-11-23 DIAGNOSIS — Z87.891 HISTORY OF SMOKING: ICD-10-CM

## 2021-11-23 DIAGNOSIS — R06.02 SHORTNESS OF BREATH: Primary | ICD-10-CM

## 2021-11-23 DIAGNOSIS — J44.9 CHRONIC OBSTRUCTIVE PULMONARY DISEASE, UNSPECIFIED COPD TYPE (HCC): ICD-10-CM

## 2021-11-23 LAB
ALBUMIN SERPL-MCNC: 3.9 G/DL (ref 3.5–5.2)
ALBUMIN SERPL-MCNC: 4 G/DL (ref 3.5–5.2)
ALBUMIN/GLOB SERPL: 1.4 G/DL
ALBUMIN/GLOB SERPL: 1.4 G/DL
ALP SERPL-CCNC: 118 U/L (ref 39–117)
ALP SERPL-CCNC: 130 U/L (ref 39–117)
ALT SERPL W P-5'-P-CCNC: 11 U/L (ref 1–33)
ALT SERPL W P-5'-P-CCNC: 11 U/L (ref 1–33)
ANION GAP SERPL CALCULATED.3IONS-SCNC: 11 MMOL/L (ref 5–15)
ANION GAP SERPL CALCULATED.3IONS-SCNC: 9 MMOL/L (ref 5–15)
AST SERPL-CCNC: 13 U/L (ref 1–32)
AST SERPL-CCNC: 16 U/L (ref 1–32)
BASOPHILS # BLD AUTO: 0.06 10*3/MM3 (ref 0–0.2)
BASOPHILS # BLD AUTO: 0.08 10*3/MM3 (ref 0–0.2)
BASOPHILS NFR BLD AUTO: 0.4 % (ref 0–1.5)
BASOPHILS NFR BLD AUTO: 0.6 % (ref 0–1.5)
BILIRUB SERPL-MCNC: 0.4 MG/DL (ref 0–1.2)
BILIRUB SERPL-MCNC: 0.7 MG/DL (ref 0–1.2)
BUN SERPL-MCNC: 15 MG/DL (ref 6–20)
BUN SERPL-MCNC: 20 MG/DL (ref 6–20)
BUN/CREAT SERPL: 20 (ref 7–25)
BUN/CREAT SERPL: 24.4 (ref 7–25)
CALCIUM SPEC-SCNC: 9.2 MG/DL (ref 8.6–10.5)
CALCIUM SPEC-SCNC: 9.5 MG/DL (ref 8.6–10.5)
CHLORIDE SERPL-SCNC: 102 MMOL/L (ref 98–107)
CHLORIDE SERPL-SCNC: 104 MMOL/L (ref 98–107)
CO2 SERPL-SCNC: 26 MMOL/L (ref 22–29)
CO2 SERPL-SCNC: 26 MMOL/L (ref 22–29)
CREAT SERPL-MCNC: 0.75 MG/DL (ref 0.57–1)
CREAT SERPL-MCNC: 0.82 MG/DL (ref 0.57–1)
DEPRECATED RDW RBC AUTO: 42.8 FL (ref 37–54)
DEPRECATED RDW RBC AUTO: 42.9 FL (ref 37–54)
EOSINOPHIL # BLD AUTO: 0.1 10*3/MM3 (ref 0–0.4)
EOSINOPHIL # BLD AUTO: 0.36 10*3/MM3 (ref 0–0.4)
EOSINOPHIL NFR BLD AUTO: 0.7 % (ref 0.3–6.2)
EOSINOPHIL NFR BLD AUTO: 2.8 % (ref 0.3–6.2)
ERYTHROCYTE [DISTWIDTH] IN BLOOD BY AUTOMATED COUNT: 13 % (ref 12.3–15.4)
ERYTHROCYTE [DISTWIDTH] IN BLOOD BY AUTOMATED COUNT: 13 % (ref 12.3–15.4)
FLUAV SUBTYP SPEC NAA+PROBE: NOT DETECTED
FLUBV RNA ISLT QL NAA+PROBE: NOT DETECTED
GFR SERPL CREATININE-BSD FRML MDRD: 72 ML/MIN/1.73
GFR SERPL CREATININE-BSD FRML MDRD: 80 ML/MIN/1.73
GLOBULIN UR ELPH-MCNC: 2.8 GM/DL
GLOBULIN UR ELPH-MCNC: 2.8 GM/DL
GLUCOSE SERPL-MCNC: 103 MG/DL (ref 65–99)
GLUCOSE SERPL-MCNC: 107 MG/DL (ref 65–99)
HCT VFR BLD AUTO: 38.9 % (ref 34–46.6)
HCT VFR BLD AUTO: 42.2 % (ref 34–46.6)
HGB BLD-MCNC: 12.6 G/DL (ref 12–15.9)
HGB BLD-MCNC: 13.7 G/DL (ref 12–15.9)
HOLD SPECIMEN: NORMAL
IMM GRANULOCYTES # BLD AUTO: 0.06 10*3/MM3 (ref 0–0.05)
IMM GRANULOCYTES # BLD AUTO: 0.06 10*3/MM3 (ref 0–0.05)
IMM GRANULOCYTES NFR BLD AUTO: 0.4 % (ref 0–0.5)
IMM GRANULOCYTES NFR BLD AUTO: 0.5 % (ref 0–0.5)
LYMPHOCYTES # BLD AUTO: 0.83 10*3/MM3 (ref 0.7–3.1)
LYMPHOCYTES # BLD AUTO: 1.3 10*3/MM3 (ref 0.7–3.1)
LYMPHOCYTES NFR BLD AUTO: 6.5 % (ref 19.6–45.3)
LYMPHOCYTES NFR BLD AUTO: 9.6 % (ref 19.6–45.3)
MCH RBC QN AUTO: 28.8 PG (ref 26.6–33)
MCH RBC QN AUTO: 29 PG (ref 26.6–33)
MCHC RBC AUTO-ENTMCNC: 32.4 G/DL (ref 31.5–35.7)
MCHC RBC AUTO-ENTMCNC: 32.5 G/DL (ref 31.5–35.7)
MCV RBC AUTO: 88.8 FL (ref 79–97)
MCV RBC AUTO: 89.6 FL (ref 79–97)
MONOCYTES # BLD AUTO: 0.75 10*3/MM3 (ref 0.1–0.9)
MONOCYTES # BLD AUTO: 1.03 10*3/MM3 (ref 0.1–0.9)
MONOCYTES NFR BLD AUTO: 5.9 % (ref 5–12)
MONOCYTES NFR BLD AUTO: 7.6 % (ref 5–12)
NEUTROPHILS NFR BLD AUTO: 10.61 10*3/MM3 (ref 1.7–7)
NEUTROPHILS NFR BLD AUTO: 11.06 10*3/MM3 (ref 1.7–7)
NEUTROPHILS NFR BLD AUTO: 81.3 % (ref 42.7–76)
NEUTROPHILS NFR BLD AUTO: 83.7 % (ref 42.7–76)
NRBC BLD AUTO-RTO: 0 /100 WBC (ref 0–0.2)
NRBC BLD AUTO-RTO: 0 /100 WBC (ref 0–0.2)
NT-PROBNP SERPL-MCNC: 590.8 PG/ML (ref 0–900)
NT-PROBNP SERPL-MCNC: 969.7 PG/ML (ref 0–900)
PLATELET # BLD AUTO: 323 10*3/MM3 (ref 140–450)
PLATELET # BLD AUTO: 345 10*3/MM3 (ref 140–450)
PMV BLD AUTO: 10.2 FL (ref 6–12)
PMV BLD AUTO: 10.2 FL (ref 6–12)
POTASSIUM SERPL-SCNC: 3.6 MMOL/L (ref 3.5–5.2)
POTASSIUM SERPL-SCNC: 3.6 MMOL/L (ref 3.5–5.2)
PROT SERPL-MCNC: 6.7 G/DL (ref 6–8.5)
PROT SERPL-MCNC: 6.8 G/DL (ref 6–8.5)
QT INTERVAL: 412 MS
QTC INTERVAL: 447 MS
RBC # BLD AUTO: 4.34 10*6/MM3 (ref 3.77–5.28)
RBC # BLD AUTO: 4.75 10*6/MM3 (ref 3.77–5.28)
SARS-COV-2 RNA PNL SPEC NAA+PROBE: NOT DETECTED
SODIUM SERPL-SCNC: 137 MMOL/L (ref 136–145)
SODIUM SERPL-SCNC: 141 MMOL/L (ref 136–145)
TROPONIN T SERPL-MCNC: 0.02 NG/ML (ref 0–0.03)
TROPONIN T SERPL-MCNC: <0.01 NG/ML (ref 0–0.03)
WBC NRBC COR # BLD: 12.69 10*3/MM3 (ref 3.4–10.8)
WBC NRBC COR # BLD: 13.61 10*3/MM3 (ref 3.4–10.8)
WHOLE BLOOD HOLD SPECIMEN: NORMAL

## 2021-11-23 PROCEDURE — 71045 X-RAY EXAM CHEST 1 VIEW: CPT

## 2021-11-23 PROCEDURE — 83880 ASSAY OF NATRIURETIC PEPTIDE: CPT

## 2021-11-23 PROCEDURE — 87636 SARSCOV2 & INF A&B AMP PRB: CPT | Performed by: EMERGENCY MEDICINE

## 2021-11-23 PROCEDURE — 94640 AIRWAY INHALATION TREATMENT: CPT

## 2021-11-23 PROCEDURE — 99282 EMERGENCY DEPT VISIT SF MDM: CPT

## 2021-11-23 PROCEDURE — 94799 UNLISTED PULMONARY SVC/PX: CPT

## 2021-11-23 PROCEDURE — 85025 COMPLETE CBC W/AUTO DIFF WBC: CPT | Performed by: STUDENT IN AN ORGANIZED HEALTH CARE EDUCATION/TRAINING PROGRAM

## 2021-11-23 PROCEDURE — 85025 COMPLETE CBC W/AUTO DIFF WBC: CPT

## 2021-11-23 PROCEDURE — 96374 THER/PROPH/DIAG INJ IV PUSH: CPT

## 2021-11-23 PROCEDURE — 80053 COMPREHEN METABOLIC PANEL: CPT | Performed by: STUDENT IN AN ORGANIZED HEALTH CARE EDUCATION/TRAINING PROGRAM

## 2021-11-23 PROCEDURE — C9803 HOPD COVID-19 SPEC COLLECT: HCPCS

## 2021-11-23 PROCEDURE — 80053 COMPREHEN METABOLIC PANEL: CPT

## 2021-11-23 PROCEDURE — 83880 ASSAY OF NATRIURETIC PEPTIDE: CPT | Performed by: STUDENT IN AN ORGANIZED HEALTH CARE EDUCATION/TRAINING PROGRAM

## 2021-11-23 PROCEDURE — 84484 ASSAY OF TROPONIN QUANT: CPT

## 2021-11-23 PROCEDURE — 36415 COLL VENOUS BLD VENIPUNCTURE: CPT

## 2021-11-23 PROCEDURE — 25010000002 METHYLPREDNISOLONE PER 40 MG: Performed by: EMERGENCY MEDICINE

## 2021-11-23 PROCEDURE — 93005 ELECTROCARDIOGRAM TRACING: CPT | Performed by: STUDENT IN AN ORGANIZED HEALTH CARE EDUCATION/TRAINING PROGRAM

## 2021-11-23 PROCEDURE — 84484 ASSAY OF TROPONIN QUANT: CPT | Performed by: STUDENT IN AN ORGANIZED HEALTH CARE EDUCATION/TRAINING PROGRAM

## 2021-11-23 RX ORDER — METHYLPREDNISOLONE SODIUM SUCCINATE 40 MG/ML
80 INJECTION, POWDER, LYOPHILIZED, FOR SOLUTION INTRAMUSCULAR; INTRAVENOUS ONCE
Status: COMPLETED | OUTPATIENT
Start: 2021-11-23 | End: 2021-11-23

## 2021-11-23 RX ORDER — IPRATROPIUM BROMIDE AND ALBUTEROL SULFATE 2.5; .5 MG/3ML; MG/3ML
3 SOLUTION RESPIRATORY (INHALATION) ONCE
Status: COMPLETED | OUTPATIENT
Start: 2021-11-23 | End: 2021-11-23

## 2021-11-23 RX ORDER — ALBUTEROL SULFATE 90 UG/1
2 AEROSOL, METERED RESPIRATORY (INHALATION) EVERY 4 HOURS PRN
Qty: 6.7 G | Refills: 0 | Status: SHIPPED | OUTPATIENT
Start: 2021-11-23

## 2021-11-23 RX ORDER — ONDANSETRON 4 MG/1
4 TABLET, ORALLY DISINTEGRATING ORAL 4 TIMES DAILY PRN
Qty: 15 TABLET | Refills: 0 | Status: SHIPPED | OUTPATIENT
Start: 2021-11-23

## 2021-11-23 RX ORDER — SODIUM CHLORIDE 0.9 % (FLUSH) 0.9 %
10 SYRINGE (ML) INJECTION AS NEEDED
Status: DISCONTINUED | OUTPATIENT
Start: 2021-11-23 | End: 2021-11-24 | Stop reason: HOSPADM

## 2021-11-23 RX ORDER — PREDNISONE 20 MG/1
60 TABLET ORAL DAILY
Qty: 12 TABLET | Refills: 0 | Status: SHIPPED | OUTPATIENT
Start: 2021-11-23 | End: 2021-11-27

## 2021-11-23 RX ADMIN — IPRATROPIUM BROMIDE AND ALBUTEROL SULFATE 3 ML: 2.5; .5 SOLUTION RESPIRATORY (INHALATION) at 00:36

## 2021-11-23 RX ADMIN — METHYLPREDNISOLONE SODIUM SUCCINATE 80 MG: 40 INJECTION, POWDER, LYOPHILIZED, FOR SOLUTION INTRAMUSCULAR; INTRAVENOUS at 00:40

## 2021-11-23 NOTE — TELEPHONE ENCOUNTER
COPD Nurse Navigator attempted to call patient in regard to Emergency Department visit date 11/22/2021.  There was no answer at the time of the attempt.

## 2021-11-23 NOTE — CASE MANAGEMENT/SOCIAL WORK
Continued Stay Note  [facility]     Patient Name: Keyla Strong  MRN: [id number]  Today's Date: 11/23/2021    Admit Date: 11/22/2021     Discharge Plan     Row Name 11/23/21 1557       Plan    Plan Comments Referral made to [facility].  Lisa Quinones, Ext. [phone]    Final Discharge Disposition Code 01 - home or self-care               Discharge Codes    No documentation.                     RENA Simmons

## 2021-11-23 NOTE — ED PROVIDER NOTES
[place]    EMERGENCY DEPARTMENT ENCOUNTER      Pt Name: Keyla Strong  MRN: [id number]  YOB: 1965  Date of evaluation: 11/22/2021  Provider: Mack Sharma DO    CHIEF COMPLAINT       Chief Complaint   Patient presents with   • Shortness of Breath         HISTORY OF PRESENT ILLNESS  (Location/Symptom, Timing/Onset, Context/Setting, Quality, Duration, Modifying Factors, Severity.)   Keyla Strong is a 56 y.o. female who presents to the emergency department for evaluation shortness of breath cough and congestion, concerned that she may have underlying pneumonia.  She has had no sputum production.  She has had some generalized muscle aches, body chills.  She has a history of smoking, no formal diagnosis of COPD, CHF.  She denies any chest pain with exertion.  No fever chills or recent illness.  She denies any abdominal pain, nausea vomiting or diarrhea.  She does not wear supplemental oxygen at home.  She denies any other acute systemic complaints this time.  She has received her Covid vaccines.      Nursing notes were reviewed.    REVIEW OF SYSTEMS    (2-9 systems for level 4, 10 or more for level 5)   ROS:  General:  No fevers, no chills, + generalized weakness  Cardiovascular:  No chest pain, no palpitations  Respiratory:  + shortness of breath, + cough, no wheezing  Gastrointestinal:  No pain, + nausea, no vomiting, no diarrhea  Musculoskeletal:  No muscle pain, no joint pain  Skin:  No rash  Neurologic:  No headache  Psychiatric:  No anxiety  Genitourinary:  No dysuria, no hematuria    Except as noted above the remainder of the review of systems was reviewed and negative.       PAST MEDICAL HISTORY     Past Medical History:   Diagnosis Date   • Anxiety    • Depression    • Migraine    • Obesity    • Psoriasis          SURGICAL HISTORY       Past Surgical History:   Procedure Laterality Date   • NO PAST SURGERIES           CURRENT MEDICATIONS       Current Facility-Administered  Medications:   •  sodium chloride 0.9 % flush 10 mL, 10 mL, Intravenous, PRN, Emergency, Triage Protocol, MD    Current Outpatient Medications:   •  albuterol sulfate  (90 Base) MCG/ACT inhaler, Inhale 2 puffs Every 4 (Four) Hours As Needed for Wheezing or Shortness of Air., Disp: 6.7 g, Rfl: 0  •  hydrOXYzine pamoate (VISTARIL) 50 MG capsule, , Disp: , Rfl:   •  ondansetron ODT (ZOFRAN-ODT) 4 MG disintegrating tablet, Place 1 tablet on the tongue 4 (Four) Times a Day As Needed for Nausea or Vomiting., Disp: 15 tablet, Rfl: 0  •  predniSONE (DELTASONE) 20 MG tablet, Take 3 tablets by mouth Daily for 4 days., Disp: 12 tablet, Rfl: 0  •  QUEtiapine (SEROquel) 100 MG tablet, , Disp: , Rfl:   •  triamcinolone (KENALOG) 0.1 % ointment, Apply  topically to the appropriate area as directed 2 (Two) Times a Day., Disp: 30 tube, Rfl: 5    ALLERGIES     Patient has no known allergies.    FAMILY HISTORY       Family History   Problem Relation Age of Onset   • Lymphoma Mother         mom passed at 82 with lymphoma   • Heart attack Father         dad passed at 63 with a heart attack   • No Known Problems Sister    • No Known Problems Brother    • No Known Problems Daughter           SOCIAL HISTORY       Social History     Socioeconomic History   • Marital status: Single   Tobacco Use   • Smoking status: Current Every Day Smoker     Packs/day: 0.50     Years: 38.00     Pack years: 19.00     Types: Cigarettes     Start date: 1/1/1981   • Smokeless tobacco: Never Used   Substance and Sexual Activity   • Alcohol use: No   • Drug use: No   • Sexual activity: Not Currently         PHYSICAL EXAM    (up to 7 for level 4, 8 or more for level 5)     Vitals:    11/22/21 2355 11/23/21 0030 11/23/21 0037 11/23/21 0100   BP: 121/77 118/62  97/46   BP Location: Left arm      Patient Position: Lying      Pulse: 74 67  68   Resp: 18 18 18    Temp: 97.7 °F (36.5 °C)      TempSrc: Oral      SpO2: 100% 100%  100%   Weight: 68 kg (150 lb)     "  Height: 172.7 cm (68\")          Physical Exam  General : Patient is awake, alert, oriented, in no acute distress, nontoxic appearing  HEENT: Pupils are equally round and reactive to light, EOMI, conjunctivae clear, sclerae white, there is no injection no icterus.  Oral mucosa is moist, no exudate. Uvula is midline  Neck: Neck is supple, full range of motion, trachea midline  Cardiac: Heart regular rate, rhythm, no murmurs, rubs, or gallops  Lungs: Lungs are clear to auscultation, there is no wheezing, rhonchi, or rales. There is no use of accessory muscles.  No excess or muscle usage, pulse ox 100% on room air.  Chest wall: There is no tenderness to palpation over the chest wall or over ribs  Abdomen: Abdomen is soft, nontender, nondistended. There are no firm or pulsatile masses, no rebound rigidity or guarding.   Musculoskeletal: 5 out of 5 strength in all 4 extremities.  No focal muscle deficits are appreciated  Neuro: Motor intact, sensory intact, level of consciousness is normal  Dermatology: Skin is warm and dry  Psych: Mentation is grossly normal, cognition is grossly normal. Affect is appropriate.      DIAGNOSTIC RESULTS     EKG: All EKGs are interpreted by the Emergency Department Physician who either signs or Co-signs this chart in the absence of a cardiologist.    ECG 12 Lead   Final Result   Test Reason : SOB   Blood Pressure :   */*   mmHG   Vent. Rate :  71 BPM     Atrial Rate :  71 BPM      P-R Int : 122 ms          QRS Dur :  88 ms       QT Int : 412 ms       P-R-T Axes :   6  73  89 degrees      QTc Int : 447 ms      Normal sinus rhythm   Nonspecific T wave abnormality   Abnormal ECG   No previous ECGs available   Confirmed by AMADEO GARCIA MD ([id number]) on 11/23/2021 1:31:48 AM      Referred By: ED MD           Confirmed By: AMADEO GARCIA MD          RADIOLOGY:   Non-plain film images such as CT, Ultrasound and MRI are read by the radiologist. Plain radiographic images are visualized and " preliminarily interpreted by the emergency physician with the below findings:      [] Radiologist's Report Reviewed:  XR Chest 1 View   Final Result   No acute cardiopulmonary findings.      Signer Name: Isaiah Pruitt MD    Signed: 11/23/2021 12:29 AM    Workstation Name: [email or web address]     [facility]            ED BEDSIDE ULTRASOUND:   Performed by ED Physician - none    LABS:    I have reviewed and interpreted all of the currently available lab results from this visit (if applicable):  Results for orders placed or performed during the hospital encounter of 11/22/21   COVID-19 and FLU A/B PCR - Swab, Nasopharynx    Specimen: Nasopharynx; Swab   Result Value Ref Range    COVID19 Not Detected Not Detected - Ref. Range    Influenza A PCR Not Detected Not Detected    Influenza B PCR Not Detected Not Detected   Comprehensive Metabolic Panel    Specimen: Blood   Result Value Ref Range    Glucose 103 (H) 65 - 99 mg/dL    BUN 15 6 - 20 mg/dL    Creatinine 0.75 0.57 - 1.00 mg/dL    Sodium 141 136 - 145 mmol/L    Potassium 3.6 3.5 - 5.2 mmol/L    Chloride 104 98 - 107 mmol/L    CO2 26.0 22.0 - 29.0 mmol/L    Calcium 9.2 8.6 - 10.5 mg/dL    Total Protein 6.7 6.0 - 8.5 g/dL    Albumin 3.90 3.50 - 5.20 g/dL    ALT (SGPT) 11 1 - 33 U/L    AST (SGOT) 16 1 - 32 U/L    Alkaline Phosphatase 130 (H) 39 - 117 U/L    Total Bilirubin 0.7 0.0 - 1.2 mg/dL    eGFR Non African Amer 80 >60 mL/min/1.73    Globulin 2.8 gm/dL    A/G Ratio 1.4 g/dL    BUN/Creatinine Ratio 20.0 7.0 - 25.0    Anion Gap 11.0 5.0 - 15.0 mmol/L   BNP    Specimen: Blood   Result Value Ref Range    proBNP 590.8 0.0 - 900.0 pg/mL   Troponin    Specimen: Blood   Result Value Ref Range    Troponin T <0.010 0.000 - 0.030 ng/mL   CBC Auto Differential    Specimen: Blood   Result Value Ref Range    WBC 12.69 (H) 3.40 - 10.80 10*3/mm3    RBC 4.75 3.77 - 5.28 10*6/mm3    Hemoglobin 13.7 12.0 - 15.9 g/dL    Hematocrit 42.2 34.0 - 46.6 %    MCV 88.8 79.0  "- 97.0 fL    MCH 28.8 26.6 - 33.0 pg    MCHC 32.5 31.5 - 35.7 g/dL    RDW 13.0 12.3 - 15.4 %    RDW-SD 42.8 37.0 - 54.0 fl    MPV 10.2 6.0 - 12.0 fL    Platelets 323 140 - 450 10*3/mm3    Neutrophil % 83.7 (H) 42.7 - 76.0 %    Lymphocyte % 6.5 (L) 19.6 - 45.3 %    Monocyte % 5.9 5.0 - 12.0 %    Eosinophil % 2.8 0.3 - 6.2 %    Basophil % 0.6 0.0 - 1.5 %    Immature Grans % 0.5 0.0 - 0.5 %    Neutrophils, Absolute 10.61 (H) 1.70 - 7.00 10*3/mm3    Lymphocytes, Absolute 0.83 0.70 - 3.10 10*3/mm3    Monocytes, Absolute 0.75 0.10 - 0.90 10*3/mm3    Eosinophils, Absolute 0.36 0.00 - 0.40 10*3/mm3    Basophils, Absolute 0.08 0.00 - 0.20 10*3/mm3    Immature Grans, Absolute 0.06 (H) 0.00 - 0.05 10*3/mm3    nRBC 0.0 0.0 - 0.2 /100 WBC   ECG 12 Lead   Result Value Ref Range    QT Interval 412 ms    QTC Interval 447 ms   Green Top (Gel)   Result Value Ref Range    Extra Tube Hold for add-ons.    Lavender Top   Result Value Ref Range    Extra Tube hold for add-on    Gold Top - SST   Result Value Ref Range    Extra Tube Hold for add-ons.    Light Blue Top   Result Value Ref Range    Extra Tube hold for add-on         All other labs were within normal range or not returned as of this dictation.      EMERGENCY DEPARTMENT COURSE and DIFFERENTIAL DIAGNOSIS/MDM:   Vitals:    Vitals:    11/22/21 2355 11/23/21 0030 11/23/21 0037 11/23/21 0100   BP: 121/77 118/62  97/46   BP Location: Left arm      Patient Position: Lying      Pulse: 74 67  68   Resp: 18 18 18    Temp: 97.7 °F (36.5 °C)      TempSrc: Oral      SpO2: 100% 100%  100%   Weight: 68 kg (150 lb)      Height: 172.7 cm (68\")               Patient with cough congestion shortness of breath generalized muscle aches over the last couple days.  She is concerned about underlying pneumonia.  She is not hypoxic, no respiratory distress, not requiring any supplemental oxygen.  Her vital signs are stable, she is afebrile.  We did obtain IV labs and imaging with results reviewed as above. "  Patient is a smoker, likely has some neuro underlying COPD.  Blood work and imaging with no acute abnormalities.  On reevaluation patient resting comfortably, no hypoxia.  We will treat for underlying COPD, smoking cessation discussed with the patient.  She will follow up with her PCP. I had a discussion with the patient/family regarding diagnosis, diagnostic results, treatment plan, and medications.  The patient/family indicated understanding of these instructions.  I spent adequate time at the bedside preceding discharge necessary to personally discuss the aftercare instructions, giving patient education, providing explanations of the results of our evaluations/findings, and my decision making to assure that the patient/family understand the plan of care.  Time was allotted to answer questions at that time and throughout the ED course.  Emphasis was placed on timely follow-up after discharge.  I also discussed the potential for the development of an acute emergent condition requiring further evaluation, admission, or even surgical intervention. I discussed that we found nothing during the visit today indicating the need for further workup, admission, or the presence of an unstable medical condition.  I encouraged the patient to return to the emergency department immediately for ANY concerns, worsening, new complaints, or if symptoms persist and unable to seek follow-up in a timely fashion.  The patient/family expressed understanding and agreement with this plan.  The patient will follow-up with their PCP in 1-2 days for reevaluation.       MEDICATIONS ADMINISTERED IN ED:  Medications   sodium chloride 0.9 % flush 10 mL (has no administration in time range)   ipratropium-albuterol (DUO-NEB) nebulizer solution 3 mL (3 mL Nebulization Given 11/23/21 0036)   methylPREDNISolone sodium succinate (SOLU-Medrol) injection 80 mg (80 mg Intravenous Given 11/23/21 0040)       PROCEDURES:  Procedures    CRITICAL CARE TIME     Total Critical Care time was 0 minutes, excluding separately reportable procedures.   There was a high probability of clinically significant/life threatening deterioration in the patient's condition which required my urgent intervention.      FINAL IMPRESSION      1. Shortness of breath    2. History of smoking    3. Chronic obstructive pulmonary disease, unspecified COPD type (HCC)          DISPOSITION/PLAN     ED Disposition     ED Disposition Condition Comment    Discharge Stable           PATIENT REFERRED TO:  Janessa Mccall, APRN  [address]  [address]  [place] [address]  [phone]    In 2 days      [facility] Emergency Department  [address] [facility] [id number] [phone]    If symptoms worsen      DISCHARGE MEDICATIONS:     Medication List      START taking these medications    albuterol sulfate  (90 Base) MCG/ACT inhaler  Commonly known as: PROVENTIL HFA;VENTOLIN HFA;PROAIR HFA  Inhale 2 puffs Every 4 (Four) Hours As Needed for Wheezing or Shortness of Air.     ondansetron ODT 4 MG disintegrating tablet  Commonly known as: ZOFRAN-ODT  Place 1 tablet on the tongue 4 (Four) Times a Day As Needed for Nausea or Vomiting.     predniSONE 20 MG tablet  Commonly known as: DELTASONE  Take 3 tablets by mouth Daily for 4 days.        CONTINUE taking these medications    hydrOXYzine pamoate 50 MG capsule  Commonly known as: VISTARIL     QUEtiapine 100 MG tablet  Commonly known as: SEROquel     triamcinolone 0.1 % ointment  Commonly known as: KENALOG  Apply  topically to the appropriate area as directed 2 (Two) Times a Day.           Where to Get Your Medications      These medications were sent to [facility] [address] [place] [address] DR[facility] [address] - [phone]  - [phone] [address], [place] [id number]    Phone: [phone]   · albuterol sulfate  (90 Base)  MCG/ACT inhaler  · ondansetron ODT 4 MG disintegrating tablet  · predniSONE 20 MG tablet             Comment: Please note this report has been produced using speech recognition software.      Mack Sharma DO  Attending Emergency Physician               Mack Sharma,   11/23/21 1818

## 2021-11-24 LAB
HOLD SPECIMEN: NORMAL
QT INTERVAL: 374 MS
QTC INTERVAL: 447 MS

## 2021-11-24 NOTE — DISCHARGE INSTRUCTIONS
Symptomatic care is recommended. Take all medications as prescribed and instructed.  Fill medications as called into your pharmacy for your presentation yesterday.  Follow up with your primary care as directed or return to Emergency Department with worsening of symptoms.

## 2022-09-29 ENCOUNTER — HOSPITAL ENCOUNTER (EMERGENCY)
Facility: HOSPITAL | Age: 57
Discharge: HOME | End: 2022-09-29
Attending: EMERGENCY MEDICINE | Admitting: EMERGENCY MEDICINE

## 2022-09-29 VITALS
BODY MASS INDEX: 23.64 KG/M2 | HEART RATE: 89 BPM | TEMPERATURE: 98.6 F | SYSTOLIC BLOOD PRESSURE: 143 MMHG | HEIGHT: 68 IN | RESPIRATION RATE: 16 BRPM | DIASTOLIC BLOOD PRESSURE: 84 MMHG | OXYGEN SATURATION: 92 % | WEIGHT: 156 LBS

## 2022-09-29 DIAGNOSIS — M79.672 ACUTE FOOT PAIN, LEFT: Primary | ICD-10-CM

## 2022-09-29 PROCEDURE — 99283 EMERGENCY DEPT VISIT LOW MDM: CPT

## 2022-10-20 ENCOUNTER — HOSPITAL ENCOUNTER (EMERGENCY)
Facility: HOSPITAL | Age: 57
Discharge: HOME | End: 2022-10-20
Attending: EMERGENCY MEDICINE | Admitting: EMERGENCY MEDICINE

## 2022-10-20 ENCOUNTER — APPOINTMENT (OUTPATIENT)
Dept: CT IMAGING | Facility: HOSPITAL | Age: 57
End: 2022-10-20

## 2022-10-20 VITALS
RESPIRATION RATE: 14 BRPM | DIASTOLIC BLOOD PRESSURE: 60 MMHG | HEIGHT: 68 IN | BODY MASS INDEX: 21.07 KG/M2 | SYSTOLIC BLOOD PRESSURE: 137 MMHG | TEMPERATURE: 98.2 F | HEART RATE: 98 BPM | OXYGEN SATURATION: 100 % | WEIGHT: 139 LBS

## 2022-10-20 DIAGNOSIS — R10.30 LOWER ABDOMINAL PAIN: Primary | ICD-10-CM

## 2022-10-20 DIAGNOSIS — E27.8 ADRENAL NODULE: ICD-10-CM

## 2022-10-20 DIAGNOSIS — B00.1 COLD SORE: ICD-10-CM

## 2022-10-20 LAB
ALBUMIN SERPL-MCNC: 3.8 G/DL (ref 3.5–5.2)
ALBUMIN/GLOB SERPL: 0.9 G/DL
ALP SERPL-CCNC: 99 U/L (ref 39–117)
ALT SERPL W P-5'-P-CCNC: 16 U/L (ref 1–33)
ANION GAP SERPL CALCULATED.3IONS-SCNC: 9 MMOL/L (ref 5–15)
AST SERPL-CCNC: 17 U/L (ref 1–32)
BASOPHILS # BLD AUTO: 0.14 10*3/MM3 (ref 0–0.2)
BASOPHILS NFR BLD AUTO: 1.5 % (ref 0–1.5)
BILIRUB SERPL-MCNC: 0.4 MG/DL (ref 0–1.2)
BILIRUB UR QL STRIP: NEGATIVE
BUN SERPL-MCNC: 18 MG/DL (ref 6–20)
BUN/CREAT SERPL: 26.1 (ref 7–25)
CALCIUM SPEC-SCNC: 9.5 MG/DL (ref 8.6–10.5)
CHLORIDE SERPL-SCNC: 99 MMOL/L (ref 98–107)
CLARITY UR: CLEAR
CO2 SERPL-SCNC: 28 MMOL/L (ref 22–29)
COLOR UR: YELLOW
CREAT SERPL-MCNC: 0.69 MG/DL (ref 0.57–1)
DEPRECATED RDW RBC AUTO: 43.8 FL (ref 37–54)
EGFRCR SERPLBLD CKD-EPI 2021: 101.4 ML/MIN/1.73
EOSINOPHIL # BLD AUTO: 0.21 10*3/MM3 (ref 0–0.4)
EOSINOPHIL NFR BLD AUTO: 2.2 % (ref 0.3–6.2)
ERYTHROCYTE [DISTWIDTH] IN BLOOD BY AUTOMATED COUNT: 13.5 % (ref 12.3–15.4)
GLOBULIN UR ELPH-MCNC: 4.3 GM/DL
GLUCOSE SERPL-MCNC: 121 MG/DL (ref 65–99)
GLUCOSE UR STRIP-MCNC: NEGATIVE MG/DL
HCT VFR BLD AUTO: 38.7 % (ref 34–46.6)
HGB BLD-MCNC: 12.8 G/DL (ref 12–15.9)
HGB UR QL STRIP.AUTO: NEGATIVE
IMM GRANULOCYTES # BLD AUTO: 0.04 10*3/MM3 (ref 0–0.05)
IMM GRANULOCYTES NFR BLD AUTO: 0.4 % (ref 0–0.5)
KETONES UR QL STRIP: NEGATIVE
LEUKOCYTE ESTERASE UR QL STRIP.AUTO: NEGATIVE
LYMPHOCYTES # BLD AUTO: 1.6 10*3/MM3 (ref 0.7–3.1)
LYMPHOCYTES NFR BLD AUTO: 16.6 % (ref 19.6–45.3)
MCH RBC QN AUTO: 29.6 PG (ref 26.6–33)
MCHC RBC AUTO-ENTMCNC: 33.1 G/DL (ref 31.5–35.7)
MCV RBC AUTO: 89.4 FL (ref 79–97)
MONOCYTES # BLD AUTO: 0.62 10*3/MM3 (ref 0.1–0.9)
MONOCYTES NFR BLD AUTO: 6.5 % (ref 5–12)
NEUTROPHILS NFR BLD AUTO: 7 10*3/MM3 (ref 1.7–7)
NEUTROPHILS NFR BLD AUTO: 72.8 % (ref 42.7–76)
NITRITE UR QL STRIP: NEGATIVE
NRBC BLD AUTO-RTO: 0 /100 WBC (ref 0–0.2)
PH UR STRIP.AUTO: 7.5 [PH] (ref 5–8)
PLATELET # BLD AUTO: 708 10*3/MM3 (ref 140–450)
PMV BLD AUTO: 8.8 FL (ref 6–12)
POTASSIUM SERPL-SCNC: 4.5 MMOL/L (ref 3.5–5.2)
PROT SERPL-MCNC: 8.1 G/DL (ref 6–8.5)
PROT UR QL STRIP: NEGATIVE
RBC # BLD AUTO: 4.33 10*6/MM3 (ref 3.77–5.28)
SODIUM SERPL-SCNC: 136 MMOL/L (ref 136–145)
SP GR UR STRIP: 1.03 (ref 1–1.03)
UROBILINOGEN UR QL STRIP: NORMAL
WBC NRBC COR # BLD: 9.61 10*3/MM3 (ref 3.4–10.8)

## 2022-10-20 PROCEDURE — 80053 COMPREHEN METABOLIC PANEL: CPT | Performed by: PHYSICIAN ASSISTANT

## 2022-10-20 PROCEDURE — 25010000002 IOPAMIDOL 61 % SOLUTION: Performed by: EMERGENCY MEDICINE

## 2022-10-20 PROCEDURE — 99284 EMERGENCY DEPT VISIT MOD MDM: CPT

## 2022-10-20 PROCEDURE — 85025 COMPLETE CBC W/AUTO DIFF WBC: CPT | Performed by: PHYSICIAN ASSISTANT

## 2022-10-20 PROCEDURE — 74177 CT ABD & PELVIS W/CONTRAST: CPT

## 2022-10-20 PROCEDURE — 81003 URINALYSIS AUTO W/O SCOPE: CPT | Performed by: PHYSICIAN ASSISTANT

## 2022-10-20 PROCEDURE — 25010000002 ONDANSETRON PER 1 MG: Performed by: PHYSICIAN ASSISTANT

## 2022-10-20 PROCEDURE — 96374 THER/PROPH/DIAG INJ IV PUSH: CPT

## 2022-10-20 RX ORDER — SODIUM CHLORIDE 0.9 % (FLUSH) 0.9 %
10 SYRINGE (ML) INJECTION AS NEEDED
Status: DISCONTINUED | OUTPATIENT
Start: 2022-10-20 | End: 2022-10-20 | Stop reason: HOSPADM

## 2022-10-20 RX ORDER — ACYCLOVIR 50 MG/G
1 OINTMENT TOPICAL 4 TIMES DAILY
Qty: 5 G | Refills: 0 | Status: SHIPPED | OUTPATIENT
Start: 2022-10-20

## 2022-10-20 RX ORDER — ONDANSETRON 4 MG/1
4 TABLET, ORALLY DISINTEGRATING ORAL EVERY 6 HOURS PRN
Qty: 12 TABLET | Refills: 0 | Status: SHIPPED | OUTPATIENT
Start: 2022-10-20

## 2022-10-20 RX ORDER — ONDANSETRON 2 MG/ML
4 INJECTION INTRAMUSCULAR; INTRAVENOUS ONCE
Status: COMPLETED | OUTPATIENT
Start: 2022-10-20 | End: 2022-10-20

## 2022-10-20 RX ADMIN — ONDANSETRON 4 MG: 2 INJECTION INTRAMUSCULAR; INTRAVENOUS at 08:14

## 2022-10-20 RX ADMIN — SODIUM CHLORIDE 1000 ML: 9 INJECTION, SOLUTION INTRAVENOUS at 08:13

## 2022-10-20 RX ADMIN — IOPAMIDOL 99 ML: 612 INJECTION, SOLUTION INTRAVENOUS at 08:35

## 2022-10-20 NOTE — DISCHARGE INSTRUCTIONS
Rest.  Clear liquids next 12 hrs then slowly advance diet as tolerated. Zofran as prescibed for nausea.  Call your PCP for follow up and for further evaluation of an incidental finding of a nodule on your left adrenal gland.  Return if worse.

## 2022-10-20 NOTE — ED PROVIDER NOTES
"Subjective   History of Present Illness  Ms. Strong is a 57 yr old female who presents to the ED via EMS with complaints of lower abdominal pain, nausea and vomiting times 1 this morning.  The abdominal pain started yesterday.  She states she was seen at [facility] yesterday for the abdominal pain.  She states they \"only checked her urine\" and then discharged her.  She reports that she did not have any labs drawn or a CT scan.  She called EMS after d/c from [facility] to come here.  She states she is homeless for the past year.  She has a history of depression and anxiety.  No prior abdominal surgeries.  She denies dysuria.  No diarrhea or constipation.  No fever.    She smokes 1 ppd.  No alcohol or drug use.          Review of Systems   Constitutional: Positive for appetite change. Negative for chills and fever.   HENT: Negative for sore throat.    Respiratory: Negative for cough and shortness of breath.    Cardiovascular: Negative for chest pain.   Gastrointestinal: Positive for abdominal pain, nausea and vomiting. Negative for blood in stool, constipation and diarrhea.   Genitourinary: Negative for dysuria.   Musculoskeletal: Negative for back pain.   Skin: Negative for rash.   Neurological: Negative for weakness, light-headedness and headaches.   Hematological: Negative.    Psychiatric/Behavioral: Negative.        Past Medical History:   Diagnosis Date   • Anxiety    • Depression    • Homelessness    • Migraine    • Obesity    • Psoriasis        No Known Allergies    Past Surgical History:   Procedure Laterality Date   • NO PAST SURGERIES         Family History   Problem Relation Age of Onset   • Lymphoma Mother         mom passed at 82 with lymphoma   • Heart attack Father         dad passed at 63 with a heart attack   • No Known Problems Sister    • No Known Problems Brother    • No Known Problems Daughter        Social History     Socioeconomic History   • Marital status: Single   Tobacco Use   • Smoking status: " Every Day     Packs/day: 0.50     Years: 38.00     Pack years: 19.00     Types: Cigarettes     Start date: 1/1/1981   • Smokeless tobacco: Never   Substance and Sexual Activity   • Alcohol use: No   • Drug use: No   • Sexual activity: Not Currently           Objective   Physical Exam  Constitutional:       General: She is not in acute distress.     Appearance: She is well-developed.   HENT:      Head: Normocephalic.      Nose: Nose normal.      Mouth/Throat:      Mouth: Mucous membranes are moist.   Eyes:      Extraocular Movements: Extraocular movements intact.   Cardiovascular:      Rate and Rhythm: Normal rate and regular rhythm.      Heart sounds: Normal heart sounds. No murmur heard.  Pulmonary:      Effort: Pulmonary effort is normal.      Breath sounds: Normal breath sounds.   Abdominal:      General: Bowel sounds are normal.      Palpations: Abdomen is soft.      Tenderness: There is abdominal tenderness (mild suprapubic tenderness). There is no guarding or rebound.   Musculoskeletal:         General: Normal range of motion.      Cervical back: Normal range of motion.   Skin:     General: Skin is warm and dry.   Neurological:      General: No focal deficit present.      Mental Status: She is alert and oriented to person, place, and time.   Psychiatric:         Mood and Affect: Mood normal.         Procedures           ED Course      57-year-old homeless female presents with diffuse lower abdominal pain since yesterday.  The patient has had some nausea and one episode of vomiting.  Normal bowel movements.  Normal urination.  Patient was seen at [facility] yesterday and discharged.  Patient returns to our ER today for further evaluation.  Past medical history of anxiety, depression and obesity.    White count is normal at 9.61.  No anemia.  Normal renal and hepatic functions.  Normal electrolytes.  Normal urinalysis.    CT abdomen pelvis:  1. Motion limited exam  2. Cholelithiasis without definite  "evidence of acute cholecystitis.  Always be considered with ultrasound if clinically indicated  3. Diverticulosis without definite evidence of acute diverticulitis  given limitations of exam  4. Mild hazy stranding within the mesentery and small lymph nodes is  nonspecific but likely inflammatory, reactive in nature.  5. Indeterminate left adrenal nodule versus hyperplasia. Follow-up can  always be considered with dedicated evaluation with CT or MRI utilizing adrenal mass protocol as clinically indicated.     The patient appears in no distress.  I spoke with her about her work-up.  I will plan to discharge her on clear liquids and have her advance her diet as tolerated in the morning.  I will prescribe Zofran for nausea.  She has been advised to follow-up with a PCP if symptoms persist and for further evaluation of the incidental left adrenal nodule.    12:54 EDT  At discharge, pt notes she has a \"fever blister\" on her lip and requests a prescription for some ointment to put on it.  Rx for Zovirax ointment prescribed.                                               MDM    Final diagnoses:   Lower abdominal pain   Adrenal nodule (HCC)   Cold sore       ED Disposition  ED Disposition     ED Disposition   Discharge    Condition   Stable    Comment   --             Janessa Mccall, APRN  [address] [address]  [phone]      call for follow up if symptoms persist.         Medication List      New Prescriptions    acyclovir 5 % ointment  Commonly known as: Zovirax  Apply 1 application topically to the appropriate area as directed 4 (Four) Times a Day.        Changed    * ondansetron ODT 4 MG disintegrating tablet  Commonly known as: ZOFRAN-ODT  Place 1 tablet on the tongue 4 (Four) Times a Day As Needed for Nausea or Vomiting.  What changed: Another medication with the same name was added. Make sure you understand how and when to take each.     * ondansetron ODT 4 MG disintegrating " tablet  Commonly known as: ZOFRAN-ODT  Place 1 tablet on the tongue Every 6 (Six) Hours As Needed for Nausea.  What changed: You were already taking a medication with the same name, and this prescription was added. Make sure you understand how and when to take each.         * This list has 2 medication(s) that are the same as other medications prescribed for you. Read the directions carefully, and ask your doctor or other care provider to review them with you.               Where to Get Your Medications      These medications were sent to [facility] [id number] - [place], [place] - [address] - [phone]  - [phone]   [address] [address]    Phone: [phone]   · acyclovir 5 % ointment  · ondansetron ODT 4 MG disintegrating tablet          Yosef Milton PA  10/20/22 9092       Yosef Milton PA  10/20/22 4636

## 2022-10-20 NOTE — CASE MANAGEMENT/SOCIAL WORK
Continued Stay Note  [facility]     Patient Name: Keyla Strong  MRN: [id number]  Today's Date: 10/20/2022    Admit Date: 10/20/2022    Plan:  follow up   Discharge Plan     Row Name 10/20/22 1358       Plan    Plan  follow up    Plan Comments RENA provided RN with bus pass for pt. upon her d/c from ED. MSW is available.    Final Discharge Disposition Code 01 - home or self-care               Discharge Codes    No documentation.                     RENA Wallis

## 2022-10-28 ENCOUNTER — PATIENT OUTREACH (OUTPATIENT)
Dept: CASE MANAGEMENT | Facility: OTHER | Age: 57
End: 2022-10-28

## 2022-10-28 NOTE — OUTREACH NOTE
AMBULATORY CASE MANAGEMENT NOTE    Name and Relationship of Patient/Support Person: Keyla Strong - Self    Care Coordination    Unable to reach patient after 4 attempts, following ED visit 10/20/22.  Unable to leave voicemails.  Have called 2 different numbers.      ANABEL JEROME  Ambulatory Case Management    10/28/2022, 09:27 EDT

## 2023-01-12 ENCOUNTER — HOSPITAL ENCOUNTER (EMERGENCY)
Facility: HOSPITAL | Age: 58
Discharge: HOME | End: 2023-01-12
Attending: EMERGENCY MEDICINE | Admitting: EMERGENCY MEDICINE
Payer: COMMERCIAL

## 2023-01-12 VITALS
WEIGHT: 140 LBS | TEMPERATURE: 97.9 F | SYSTOLIC BLOOD PRESSURE: 128 MMHG | HEART RATE: 82 BPM | DIASTOLIC BLOOD PRESSURE: 76 MMHG | OXYGEN SATURATION: 99 % | HEIGHT: 68 IN | RESPIRATION RATE: 14 BRPM | BODY MASS INDEX: 21.22 KG/M2

## 2023-01-12 DIAGNOSIS — J02.9 SORE THROAT: Primary | ICD-10-CM

## 2023-01-12 LAB
FLUAV RNA RESP QL NAA+PROBE: NOT DETECTED
FLUBV RNA RESP QL NAA+PROBE: NOT DETECTED
S PYO AG THROAT QL: NEGATIVE
SARS-COV-2 RNA RESP QL NAA+PROBE: NOT DETECTED

## 2023-01-12 PROCEDURE — 87880 STREP A ASSAY W/OPTIC: CPT

## 2023-01-12 PROCEDURE — 87081 CULTURE SCREEN ONLY: CPT

## 2023-01-12 PROCEDURE — 99284 EMERGENCY DEPT VISIT MOD MDM: CPT

## 2023-01-12 PROCEDURE — 87636 SARSCOV2 & INF A&B AMP PRB: CPT

## 2023-01-12 PROCEDURE — 99283 EMERGENCY DEPT VISIT LOW MDM: CPT

## 2023-01-12 PROCEDURE — C9803 HOPD COVID-19 SPEC COLLECT: HCPCS

## 2023-01-12 RX ORDER — IBUPROFEN 800 MG/1
800 TABLET ORAL ONCE
Status: COMPLETED | OUTPATIENT
Start: 2023-01-12 | End: 2023-01-12

## 2023-01-12 RX ORDER — ACETAMINOPHEN 325 MG/1
975 TABLET ORAL ONCE
Status: DISCONTINUED | OUTPATIENT
Start: 2023-01-12 | End: 2023-01-12

## 2023-01-12 RX ORDER — FLUTICASONE PROPIONATE 50 MCG
2 SPRAY, SUSPENSION (ML) NASAL DAILY
Qty: 9.9 ML | Refills: 0 | Status: SHIPPED | OUTPATIENT
Start: 2023-01-12

## 2023-01-12 RX ADMIN — IBUPROFEN 800 MG: 800 TABLET, FILM COATED ORAL at 20:08

## 2023-01-12 NOTE — ED PROVIDER NOTES
Subjective  History of Present Illness:    This is a 57-year-old female who presents emergency room today with chief complaint of sore throat.  Her sore throat began this afternoon.  Tells me that she had a previous throat infection several months ago was given antibiotics, however she completed that course and it has been gone for several months, however was concerned again when her sore throat started today.  Per triage note, the patient called EMS from [facility], requesting a ride to the friend's house, she was then brought here by EMS for a sore throat.  Patient denies any fevers or chills.  Denies any cough or congestion, however does report some runny nose.  She is tolerating liquids okay and reports that she drank a Mountain Dew this morning.      Nurses Notes reviewed and agree, including vitals, allergies, social history and prior medical history.     REVIEW OF SYSTEMS: All systems reviewed and not pertinent unless noted.  Review of Systems   HENT: Positive for congestion and sore throat.    All other systems reviewed and are negative.      Past Medical History:   Diagnosis Date   • Anxiety    • Depression    • Homelessness    • Migraine    • Obesity    • Psoriasis        Allergies:    Patient has no known allergies.      Past Surgical History:   Procedure Laterality Date   • NO PAST SURGERIES           Social History     Socioeconomic History   • Marital status: Single   Tobacco Use   • Smoking status: Every Day     Packs/day: 0.50     Years: 38.00     Pack years: 19.00     Types: Cigarettes     Start date: 1/1/1981   • Smokeless tobacco: Never   Substance and Sexual Activity   • Alcohol use: No   • Drug use: No   • Sexual activity: Not Currently         Family History   Problem Relation Age of Onset   • Lymphoma Mother         mom passed at 82 with lymphoma   • Heart attack Father         dad passed at 63 with a heart attack   • No Known Problems Sister    • No Known Problems Brother    • No Known Problems  "Daughter        Objective  Physical Exam:  /76   Pulse 82   Temp 97.9 °F (36.6 °C) (Oral)   Resp 14   Ht 172.7 cm (68\")   Wt 63.5 kg (140 lb)   SpO2 99%   BMI 21.29 kg/m²      Physical Exam  Vitals and nursing note reviewed.   Constitutional:       Appearance: She is normal weight.      Comments: Chronically ill-appearing   HENT:      Head: Normocephalic.      Nose: Congestion present.      Mouth/Throat:      Mouth: Mucous membranes are moist.      Pharynx: Oropharynx is clear. Uvula midline. No pharyngeal swelling, oropharyngeal exudate, posterior oropharyngeal erythema or uvula swelling.      Tonsils: No tonsillar exudate or tonsillar abscesses. 0 on the right. 0 on the left.      Comments: No evidence of peritonsillar abscess, the tonsils are not enlarged, nonerythematous, no exudate, uvula was midline.  No posterior oropharynx erythema.  Eyes:      Conjunctiva/sclera: Conjunctivae normal.   Neck:      Comments: Mild cervical adenopathy bilaterally, likely reactive.    Cardiovascular:      Rate and Rhythm: Normal rate and regular rhythm.      Heart sounds: Normal heart sounds.   Pulmonary:      Effort: Pulmonary effort is normal. No respiratory distress.      Breath sounds: Normal breath sounds. No stridor. No wheezing, rhonchi or rales.   Chest:      Chest wall: No tenderness.   Abdominal:      Palpations: Abdomen is soft.   Musculoskeletal:      Cervical back: Neck supple.   Lymphadenopathy:      Cervical: Cervical adenopathy present.   Skin:     General: Skin is warm and dry.      Capillary Refill: Capillary refill takes less than 2 seconds.      Comments: Various abrasions on the bilateral upper extremities.   Neurological:      General: No focal deficit present.      Mental Status: She is alert and oriented to person, place, and time.   Psychiatric:         Mood and Affect: Mood normal.         Behavior: Behavior normal.         Procedures    ED Course:    ED Course as of 01/12/23 2249   Thu Jan " 12, 2023 1853 Nontoxic-appearing, will obtain a rapid strep, and COVID flu. [JR]   1950 COVID flu negative, strep negative, strep will be cultured.  She is safe for discharge home at this time.  Will give return precautions. [JR]      ED Course User Index  [JR] Chapito Treviño PA-C       Lab Results (last 24 hours)     Procedure Component Value Units Date/Time    Rapid Strep A Screen - Swab, Throat [[id number]]  (Normal) Collected: 01/12/23 1850    Specimen: Swab from Throat Updated: 01/12/23 1927     Strep A Ag Negative    COVID-19 and FLU A/B PCR - Swab, Nasopharynx [[id number]]  (Normal) Collected: 01/12/23 1850    Specimen: Swab from Nasopharynx Updated: 01/12/23 1940     COVID19 Not Detected     Influenza A PCR Not Detected     Influenza B PCR Not Detected    Narrative:      Fact sheet for providers: https://www.fda.gov/media/561332/download    Fact sheet for patients: https://www.fda.gov/media/459352/download    Test performed by PCR.    Beta Strep Culture, Throat - Swab, Throat [[id number]] Collected: 01/12/23 1850    Specimen: Swab from Throat Updated: 01/12/23 1927           No radiology results from the last 24 hrs       MDM    Initial impression of presenting illness:   This is a 57-year-old female who presents emergency room today with chief complaint of sore throat.  Her sore throat began this afternoon.  Tells me that she had a previous throat infection several months ago was given antibiotics, however she completed that course and it has been gone for several months, however was concerned again when her sore throat started today.  Per triage note, the patient called EMS from [facility], requesting a ride to the friend's house, she was then brought here by EMS for a sore throat.  Patient denies any fevers or chills.  Denies any cough or congestion, however does report some runny nose.  She is tolerating liquids okay and reports that she drank a Mountain Dew this morning.      DDX: includes but is not limited  to: Strep throat, peritonsillar abscess, retropharyngeal abscess/infection, viral pharyngitis, viral illness    With absence of tonsillar exudate or edema, posterior oropharynx erythema, and the uvula being midline with no concerning features of the posterior oropharynx, I believe that peritonsillar abscess can reasonably be ruled out at this point.  Furthermore, the patient is not having any fevers or systemic symptoms, is afebrile here, nontoxic-appearing, not tachycardic which makes me more suspicious for a viral etiology at this point in addition to the story obtained from EMS by nursing staff and her reported congestion as well..  She did have some mild cervical adenopathy which is likely reactive to a probable viral pharyngitis.      Patient arrives hemodynamically stable, afebrile, not tachycardic, nontoxic-appearing with appropriate oxygen saturation.  With vitals interpreted by myself.     Pertinent features from physical exam: Mild congestion of the nares present.  Patient appears chronically ill/disheveled.     Initial diagnostic plan: Obtain rapid strep, COVID and flu testing    Results from initial plan were reviewed and interpreted by me revealing negative COVID flu, negative strep.  Strep will be cultured.  Highly suspect a viral pharyngitis etiology based on presentation and physical exam and clinical history.    Diagnostic information from other sources: N/A    Interventions / Re-evaluation: Given Tylenol for sore throat.  She is safe for discharge home at this time.  She is nontoxic-appearing.  Afebrile, hemodynamically stable, nontoxic, not tachycardic or hypoxic with appropriate respirations.  Patient was also given a meal in the emergency room with a sandwich and chips in addition to a drink, there is no concern for any difficulty swallowing.    Results/clinical rationale were discussed with patient at bedside.  Discussed return precautions.  Patient is agreeable to the plan.  She will return  for any worsening of symptoms.    Consultations/Discussion of results with other physicians: N/A    Disposition plan: Discharged home.  Discussed symptomatic treatment with the patient including Motrin and Tylenol for sore throat.  We will send Flonase to her pharmacy for congestion.  Highly suspect viral etiology of viral pharyngitis with throat was nonerythematous, no tonsillar edema or exudate, uvula was midline.  Do not feel that a further work-up is indicated at this time.  She is agreeable to plan, will be discharged home at this time in good condition.  -----    Final diagnoses:   Sore throat        Chapito Treviño PA-C  01/12/23 7104

## 2023-01-13 NOTE — DISCHARGE INSTRUCTIONS
Return to emergency room for any worsening symptoms, development of fevers and chills.  Follow-up with your primary care in the next several days for further evaluation.  I have sent Flonase to your pharmacy for your congestion.  You can additionally utilize Tylenol and Motrin for your sore throat that began today.

## 2023-01-14 LAB — BACTERIA SPEC AEROBE CULT: NORMAL

## 2023-01-26 ENCOUNTER — TELEPHONE (OUTPATIENT)
Dept: FAMILY MEDICINE CLINIC | Facility: CLINIC | Age: 58
End: 2023-01-26

## 2023-12-13 ENCOUNTER — HOSPITAL ENCOUNTER (EMERGENCY)
Facility: HOSPITAL | Age: 58
Discharge: HOME | End: 2023-12-13
Attending: EMERGENCY MEDICINE
Payer: MEDICAID

## 2023-12-13 VITALS
HEART RATE: 81 BPM | DIASTOLIC BLOOD PRESSURE: 52 MMHG | BODY MASS INDEX: 21.66 KG/M2 | OXYGEN SATURATION: 100 % | WEIGHT: 130 LBS | SYSTOLIC BLOOD PRESSURE: 118 MMHG | HEIGHT: 65 IN | RESPIRATION RATE: 16 BRPM | TEMPERATURE: 97.7 F

## 2023-12-13 DIAGNOSIS — J06.9 VIRAL URI: Primary | ICD-10-CM

## 2023-12-13 PROCEDURE — 99283 EMERGENCY DEPT VISIT LOW MDM: CPT

## 2023-12-13 NOTE — ED PROVIDER NOTES
EMERGENCY DEPARTMENT ENCOUNTER    Pt Name: Keyla Strong  MRN: [id number]  Pt :   1965  Room Number:    Date of encounter:  2023  PCP: Janessa Mccall APRN  ED Provider: GAYLA Canseco    Historian: Patient    HPI:  Chief Complaint: Cough, sinus congestion    Context: Keyla Strong is a 58 y.o. female who presents to the ED c/o a recurrent sinus infection. Patient reports that she was given antibiotics approximately 1 week ago for an upper respiratory infection and does not think that it was the appropriate antibiotics.  She was evaluated at the [facility] emergency department on December 10, 2023 with a similar complaint.  Patient also seen in the emergency department at [facility] today.  She was instructed that this visit that her symptoms did not require additional antibiotics.  HPI     REVIEW OF SYSTEMS  A chief complaint appropriate review of systems was completed and is negative except as noted in the HPI.     PAST MEDICAL HISTORY  Past Medical History:   Diagnosis Date    Anxiety     Depression     Homelessness     Migraine     Obesity     Psoriasis        PAST SURGICAL HISTORY  Past Surgical History:   Procedure Laterality Date    NO PAST SURGERIES         FAMILY HISTORY  Family History   Problem Relation Age of Onset    Lymphoma Mother         mom passed at 82 with lymphoma    Heart attack Father         dad passed at 63 with a heart attack    No Known Problems Sister     No Known Problems Brother     No Known Problems Daughter        SOCIAL HISTORY  Social History     Socioeconomic History    Marital status: Single   Tobacco Use    Smoking status: Every Day     Packs/day: 0.50     Years: 38.00     Additional pack years: 0.00     Total pack years: 19.00     Types: Cigarettes     Start date: 1981    Smokeless tobacco: Never   Substance and Sexual Activity    Alcohol use: No    Drug use: No    Sexual activity: Not Currently        ALLERGIES  Acetaminophen    PHYSICAL EXAM  Physical Exam  GENERAL:   Appears in no acute distress.  Disheveled in appearance.  HENT: Nares patent.  Nasal congestion.  EYES: No scleral icterus.  CV: Regular rhythm, regular rate.  RESPIRATORY: Normal effort.    MUSCULOSKELETAL: No deformities.   NEURO: Alert, moves all extremities, follows commands.  SKIN: Warm, dry, no rash visualized.    I have reviewed the triage vital signs and nursing notes.     LAB RESULTS      If labs were ordered, I independently reviewed the results and considered them in treating the patient.    RADIOLOGY  No orders to display     [] Radiologist's Report Reviewed:  I ordered and independently interpreted the above noted radiographic studies.  See radiologist's dictation for official interpretation.      PROCEDURES    Procedures    No orders to display       MEDICATIONS GIVEN IN ER    Medications - No data to display    MEDICAL DECISION MAKING, PROGRESS, and CONSULTS   Medical Decision Making  Problems Addressed:  Viral URI: acute illness or injury        All labs have been independently reviewed by me.  All radiology studies have been interpreted by me and the radiologist dictating the report.  All EKG's have been independently interpreted by me as well as and overseeing attending physician.    [] Discussed with radiology regarding test interpretation:    Discussion below represents my analysis of pertinent findings related to patient's condition, differential diagnosis, treatment plan and final disposition.    Differential diagnosis:  The differential diagnosis associated with the patient's presentation includes: Viral upper respiratory infection, bacterial pneumonia, sinusitis, allergic rhinitis, influenza, COVID-19    Additional sources  Discussed/ obtained information from independent historians:   [] Spouse  [] Parent  [] Family member  [] Friend  [] EMS   [] Other:  External (non-ED) record review:   [] Inpatient record:   []  Office record:   [] Outpatient record:   [] Prior Outpatient labs:   [] Prior Outpatient radiology:   [] Primary Care record:   [x] Outside ED record:   [] Other:   Patient's care impacted by:   [] Diabetes  [] Hypertension  [] Hyperlipidemia  [] Hypothyroidism   [] Coronary Artery Disease   [] COPD   [] Cancer   [] Obesity  [] GERD   [] Tobacco Abuse   [] Substance Abuse    [] Anxiety   [] Depression   [] Other:   Care significantly affected by Social Determinants of Health (housing and economic circumstances, unemployment)    [x] Yes     [] No   If yes, Patient's care significantly limited by  Social Determinants of Health including:   [x] Inadequate housing   [x] Low income   [] Alcoholism and drug addiction in family   [] Problems related to primary support group   [] Unemployment   [] Problems related to employment   [] Other Social Determinants of Health:     Shared decision making:  I had a discussion with the patient/family regarding diagnosis, diagnostic results, treatment plan, and medications.  The patient/family indicated understanding of these instructions.  I spent adequate time at the bedside preceding discharge necessary to personally discuss the aftercare instructions, giving patient education, providing explanations of the results of our evaluations/findings, and my decision making to assure that the patient/family understand the plan of care.  Time was allotted to answer questions at that time and throughout the ED course.  Emphasis was placed on timely follow-up after discharge.  I also discussed the potential for the development of an acute emergent condition requiring further evaluation, admission, or even surgical intervention. I discussed that we found nothing during the visit today indicating the need for further workup, admission, or the presence of an unstable medical condition.  I encouraged the patient to return to the emergency department immediately for ANY concerns, worsening, new  complaints, or if symptoms persist and unable to seek follow-up in a timely fashion.  The patient/family expressed understanding and agreement with this plan.  The patient will follow-up with primary care for reevaluation.      Orders placed during this visit:  No orders of the defined types were placed in this encounter.    I considered prescription management  with:   [] Pain medication  [] Antiviral  [x] Antibiotic: Clinical presentation and ER workup without evidence of bacterial infection requiring treatment with antibiotics.    [] Other:   Rationale:    ED Course:    ED Course as of 12/15/23 1327   Wed Dec 13, 2023   0317 In summary this is a nontoxic-appearing 58-year-old female who presents to the ER for evaluation of nasal congestion and a runny nose.  Seen earlier in the day today at the [facility] for the same complaint and instructed she did not require additional antibiotics for her symptoms.  No acute or emergent findings demonstrated on physical exam. Based on clinical presentation and workup in ED including labs and imaging, no clear indication for treatment beyond symptomatic management. At time of discharge disposition patient is afebrile, nontoxic appearing, vital signs stable and able to maintain O2 sats of 100% on room air.  Patient is homeless which complicates all aspects of care. [JG]      ED Course User Index  [JG] Juan Ortiz PA            DIAGNOSIS  Final diagnoses:   Viral URI       DISPOSITION    ED Disposition       ED Disposition   Discharge    Condition   Stable    Comment   --               Please note that portions of this document were completed with voice recognition software.        Juan Ortiz PA  12/15/23 1321

## 2023-12-13 NOTE — DISCHARGE INSTRUCTIONS
Symptomatic care is recommended with tylenol or ibuprofen as needed for generalized aches and pains and fever.  Drink plenty of fluids.  Get plenty of rest.  Take all medications as prescribed and instructed. Follow up with primary care as directed or return to Emergency Department with worsening of symptoms.

## 2023-12-16 ENCOUNTER — APPOINTMENT (OUTPATIENT)
Dept: CT IMAGING | Facility: HOSPITAL | Age: 58
End: 2023-12-16
Payer: MEDICAID

## 2023-12-16 ENCOUNTER — HOSPITAL ENCOUNTER (EMERGENCY)
Facility: HOSPITAL | Age: 58
Discharge: HOME | End: 2023-12-16
Attending: EMERGENCY MEDICINE
Payer: MEDICAID

## 2023-12-16 ENCOUNTER — APPOINTMENT (OUTPATIENT)
Dept: GENERAL RADIOLOGY | Facility: HOSPITAL | Age: 58
End: 2023-12-16
Payer: MEDICAID

## 2023-12-16 VITALS
TEMPERATURE: 97.7 F | BODY MASS INDEX: 19.7 KG/M2 | SYSTOLIC BLOOD PRESSURE: 105 MMHG | RESPIRATION RATE: 18 BRPM | OXYGEN SATURATION: 95 % | WEIGHT: 130 LBS | HEART RATE: 84 BPM | HEIGHT: 68 IN | DIASTOLIC BLOOD PRESSURE: 56 MMHG

## 2023-12-16 DIAGNOSIS — R10.30 LOWER ABDOMINAL PAIN: ICD-10-CM

## 2023-12-16 DIAGNOSIS — K52.9 GASTROENTERITIS: ICD-10-CM

## 2023-12-16 DIAGNOSIS — R11.2 NAUSEA VOMITING AND DIARRHEA: Primary | ICD-10-CM

## 2023-12-16 DIAGNOSIS — R19.7 NAUSEA VOMITING AND DIARRHEA: Primary | ICD-10-CM

## 2023-12-16 LAB
ALBUMIN SERPL-MCNC: 4.3 G/DL (ref 3.5–5.2)
ALBUMIN/GLOB SERPL: 1.6 G/DL
ALP SERPL-CCNC: 107 U/L (ref 39–117)
ALT SERPL W P-5'-P-CCNC: 17 U/L (ref 1–33)
AMPHET+METHAMPHET UR QL: NEGATIVE
AMPHETAMINES UR QL: NEGATIVE
ANION GAP SERPL CALCULATED.3IONS-SCNC: 10 MMOL/L (ref 5–15)
AST SERPL-CCNC: 24 U/L (ref 1–32)
BARBITURATES UR QL SCN: NEGATIVE
BASOPHILS # BLD AUTO: 0.06 10*3/MM3 (ref 0–0.2)
BASOPHILS NFR BLD AUTO: 0.8 % (ref 0–1.5)
BENZODIAZ UR QL SCN: NEGATIVE
BILIRUB SERPL-MCNC: 0.2 MG/DL (ref 0–1.2)
BILIRUB UR QL STRIP: NEGATIVE
BUN SERPL-MCNC: 21 MG/DL (ref 6–20)
BUN/CREAT SERPL: 39.6 (ref 7–25)
BUPRENORPHINE SERPL-MCNC: NEGATIVE NG/ML
CALCIUM SPEC-SCNC: 9.2 MG/DL (ref 8.6–10.5)
CANNABINOIDS SERPL QL: NEGATIVE
CHLORIDE SERPL-SCNC: 107 MMOL/L (ref 98–107)
CLARITY UR: CLEAR
CO2 SERPL-SCNC: 26 MMOL/L (ref 22–29)
COCAINE UR QL: NEGATIVE
COLOR UR: YELLOW
CREAT SERPL-MCNC: 0.53 MG/DL (ref 0.57–1)
D-LACTATE SERPL-SCNC: 1.2 MMOL/L (ref 0.5–2)
DEPRECATED RDW RBC AUTO: 42.9 FL (ref 37–54)
EGFRCR SERPLBLD CKD-EPI 2021: 107.4 ML/MIN/1.73
EOSINOPHIL # BLD AUTO: 0.16 10*3/MM3 (ref 0–0.4)
EOSINOPHIL NFR BLD AUTO: 2.2 % (ref 0.3–6.2)
ERYTHROCYTE [DISTWIDTH] IN BLOOD BY AUTOMATED COUNT: 13 % (ref 12.3–15.4)
ETHANOL BLD-MCNC: <10 MG/DL (ref 0–10)
FENTANYL UR-MCNC: NEGATIVE NG/ML
GLOBULIN UR ELPH-MCNC: 2.7 GM/DL
GLUCOSE SERPL-MCNC: 102 MG/DL (ref 65–99)
GLUCOSE UR STRIP-MCNC: NEGATIVE MG/DL
HCT VFR BLD AUTO: 36.6 % (ref 34–46.6)
HGB BLD-MCNC: 12.1 G/DL (ref 12–15.9)
HGB UR QL STRIP.AUTO: NEGATIVE
IMM GRANULOCYTES # BLD AUTO: 0.01 10*3/MM3 (ref 0–0.05)
IMM GRANULOCYTES NFR BLD AUTO: 0.1 % (ref 0–0.5)
KETONES UR QL STRIP: NEGATIVE
LEUKOCYTE ESTERASE UR QL STRIP.AUTO: NEGATIVE
LIPASE SERPL-CCNC: 35 U/L (ref 13–60)
LYMPHOCYTES # BLD AUTO: 0.87 10*3/MM3 (ref 0.7–3.1)
LYMPHOCYTES NFR BLD AUTO: 11.8 % (ref 19.6–45.3)
MCH RBC QN AUTO: 30 PG (ref 26.6–33)
MCHC RBC AUTO-ENTMCNC: 33.1 G/DL (ref 31.5–35.7)
MCV RBC AUTO: 90.8 FL (ref 79–97)
METHADONE UR QL SCN: NEGATIVE
MONOCYTES # BLD AUTO: 0.48 10*3/MM3 (ref 0.1–0.9)
MONOCYTES NFR BLD AUTO: 6.5 % (ref 5–12)
NEUTROPHILS NFR BLD AUTO: 5.77 10*3/MM3 (ref 1.7–7)
NEUTROPHILS NFR BLD AUTO: 78.6 % (ref 42.7–76)
NITRITE UR QL STRIP: NEGATIVE
NRBC BLD AUTO-RTO: 0 /100 WBC (ref 0–0.2)
OPIATES UR QL: NEGATIVE
OXYCODONE UR QL SCN: NEGATIVE
PCP UR QL SCN: NEGATIVE
PH UR STRIP.AUTO: <=5 [PH] (ref 5–8)
PLATELET # BLD AUTO: 292 10*3/MM3 (ref 140–450)
PMV BLD AUTO: 10.5 FL (ref 6–12)
POTASSIUM SERPL-SCNC: 4 MMOL/L (ref 3.5–5.2)
PROT SERPL-MCNC: 7 G/DL (ref 6–8.5)
PROT UR QL STRIP: NEGATIVE
RBC # BLD AUTO: 4.03 10*6/MM3 (ref 3.77–5.28)
SODIUM SERPL-SCNC: 143 MMOL/L (ref 136–145)
SP GR UR STRIP: 1.05 (ref 1–1.03)
TRICYCLICS UR QL SCN: NEGATIVE
UROBILINOGEN UR QL STRIP: ABNORMAL
WBC NRBC COR # BLD AUTO: 7.35 10*3/MM3 (ref 3.4–10.8)

## 2023-12-16 PROCEDURE — 25810000003 SODIUM CHLORIDE 0.9 % SOLUTION: Performed by: PHYSICIAN ASSISTANT

## 2023-12-16 PROCEDURE — 71045 X-RAY EXAM CHEST 1 VIEW: CPT

## 2023-12-16 PROCEDURE — 81003 URINALYSIS AUTO W/O SCOPE: CPT | Performed by: PHYSICIAN ASSISTANT

## 2023-12-16 PROCEDURE — 74177 CT ABD & PELVIS W/CONTRAST: CPT

## 2023-12-16 PROCEDURE — 80053 COMPREHEN METABOLIC PANEL: CPT | Performed by: PHYSICIAN ASSISTANT

## 2023-12-16 PROCEDURE — 82077 ASSAY SPEC XCP UR&BREATH IA: CPT | Performed by: PHYSICIAN ASSISTANT

## 2023-12-16 PROCEDURE — 85025 COMPLETE CBC W/AUTO DIFF WBC: CPT | Performed by: PHYSICIAN ASSISTANT

## 2023-12-16 PROCEDURE — 83690 ASSAY OF LIPASE: CPT | Performed by: PHYSICIAN ASSISTANT

## 2023-12-16 PROCEDURE — 83605 ASSAY OF LACTIC ACID: CPT | Performed by: PHYSICIAN ASSISTANT

## 2023-12-16 PROCEDURE — 96374 THER/PROPH/DIAG INJ IV PUSH: CPT

## 2023-12-16 PROCEDURE — 99285 EMERGENCY DEPT VISIT HI MDM: CPT

## 2023-12-16 PROCEDURE — 25510000001 IOPAMIDOL 61 % SOLUTION: Performed by: EMERGENCY MEDICINE

## 2023-12-16 PROCEDURE — 80307 DRUG TEST PRSMV CHEM ANLYZR: CPT | Performed by: PHYSICIAN ASSISTANT

## 2023-12-16 PROCEDURE — 25010000002 ONDANSETRON PER 1 MG: Performed by: PHYSICIAN ASSISTANT

## 2023-12-16 RX ORDER — ONDANSETRON 4 MG/1
4 TABLET, ORALLY DISINTEGRATING ORAL EVERY 6 HOURS PRN
Qty: 15 TABLET | Refills: 0 | Status: SHIPPED | OUTPATIENT
Start: 2023-12-16

## 2023-12-16 RX ORDER — ONDANSETRON 2 MG/ML
4 INJECTION INTRAMUSCULAR; INTRAVENOUS ONCE
Status: COMPLETED | OUTPATIENT
Start: 2023-12-16 | End: 2023-12-16

## 2023-12-16 RX ORDER — SODIUM CHLORIDE 0.9 % (FLUSH) 0.9 %
10 SYRINGE (ML) INJECTION AS NEEDED
Status: DISCONTINUED | OUTPATIENT
Start: 2023-12-16 | End: 2023-12-16 | Stop reason: HOSPADM

## 2023-12-16 RX ADMIN — ONDANSETRON 4 MG: 2 INJECTION INTRAMUSCULAR; INTRAVENOUS at 11:17

## 2023-12-16 RX ADMIN — IOPAMIDOL 90 ML: 612 INJECTION, SOLUTION INTRAVENOUS at 12:21

## 2023-12-16 RX ADMIN — SODIUM CHLORIDE 1000 ML: 9 INJECTION, SOLUTION INTRAVENOUS at 11:14

## 2023-12-16 NOTE — ED PROVIDER NOTES
"Subjective   History of Present Illness  Ms. Strong is a 58 year old homeless female with history of schizophrenia, COPD and depression, who presents via EMS with complaints of nausea, vomiting and diarrhea for the past week.  She states she has been sleeping outdoors and has not been eating or drinking much.  She has diffuse abdominal pain.  No urinary symptoms.  She denies alcohol or drug abuse.  She smokes 1/2 pack per day.  No chest pain, cough or shortness of breath.        Review of Systems   Constitutional:  Positive for appetite change and fatigue. Negative for fever.   HENT:  Negative for sore throat.    Respiratory:  Negative for cough and shortness of breath.    Cardiovascular:  Negative for chest pain.   Gastrointestinal:  Positive for abdominal pain, diarrhea, nausea and vomiting. Negative for blood in stool.   Genitourinary:  Negative for dysuria.   Musculoskeletal:  Negative for back pain.   Skin:  Positive for rash (psoriatic rash on arms).   Neurological:  Positive for weakness (generalized). Negative for seizures.   Hematological:  Does not bruise/bleed easily.   Psychiatric/Behavioral:  Negative for confusion. The patient is nervous/anxious.        Past Medical History:   Diagnosis Date    Anxiety     Depression     Homelessness     Migraine     Obesity     Psoriasis        Allergies   Allergen Reactions    Acetaminophen Other (See Comments)     \"Makes me sleep a lot\"       Past Surgical History:   Procedure Laterality Date    NO PAST SURGERIES         Family History   Problem Relation Age of Onset    Lymphoma Mother         mom passed at 82 with lymphoma    Heart attack Father         dad passed at 63 with a heart attack    No Known Problems Sister     No Known Problems Brother     No Known Problems Daughter        Social History     Socioeconomic History    Marital status: Single   Tobacco Use    Smoking status: Every Day     Packs/day: 0.50     Years: 38.00     Additional pack years: 0.00     " Total pack years: 19.00     Types: Cigarettes     Start date: 1/1/1981    Smokeless tobacco: Never   Substance and Sexual Activity    Alcohol use: No    Drug use: No    Sexual activity: Not Currently           Objective   Physical Exam  Constitutional:       Comments: Disheveled appearance with diarrhea all over her legs and clothing.   HENT:      Head: Normocephalic and atraumatic.      Nose: Nose normal.      Mouth/Throat:      Mouth: Mucous membranes are dry.   Eyes:      General: No scleral icterus.     Conjunctiva/sclera: Conjunctivae normal.      Pupils: Pupils are equal, round, and reactive to light.   Cardiovascular:      Rate and Rhythm: Normal rate and regular rhythm.      Pulses: Normal pulses.      Heart sounds: Normal heart sounds.   Pulmonary:      Effort: Pulmonary effort is normal.      Breath sounds: Normal breath sounds. No wheezing, rhonchi or rales.   Abdominal:      Tenderness: There is abdominal tenderness (moderate diffuse tenderness). There is no guarding or rebound.   Musculoskeletal:      Cervical back: Neck supple.      Right lower leg: No edema.      Left lower leg: No edema.   Skin:     Coloration: Skin is pale. Skin is not jaundiced.      Findings: Rash (Psoriatic rash on arms) present.   Neurological:      General: No focal deficit present.      Mental Status: She is alert and oriented to person, place, and time.   Psychiatric:      Comments: Anxious appearing, tremulous extremities         Procedures           ED Course      In summary, 58-year-old homeless female with history of schizophrenia, presents with 1 week history of nausea, vomiting and diarrhea.  She complains of diffuse abdominal pain.  She states that she has not been eating or drinking much.  She arrived by EMS covered with diarrhea.  She denies any alcohol or drug use.  She has been sleeping outdoors.    MDM: Differential includes electrolyte disorder, dehydration, renal failure, infectious or inflammatory bowel  disease, etc.    Labs and UA ordered.  Patient started on IV fluids and given Zofran.  Chest x-ray ordered.    Labs are bland.  White count is 7.35.  No anemia.  Glucose is 102.  Creatinine is 0.53.  Normal chemistries.  Normal lipase at 35.  Ethanol is negative.  Urinalysis shows no evidence of infection.  Lactic acid is normal at 1.2.    Chest x-ray shows no acute abnormality.    CT abdomen pelvis with contrast:  1. Stable examination without acute findings in the abdomen or pelvis.   2. Cholelithiasis.    15:13 EST  I spoke with the patient about her workup.  Repeat abdominal exam is benign.  She is mentating well.  She has been very cooperative throughout the ED stay.  We have not obtained a stool specimen as of yet.  I will have the staff give her a box lunch and something to drink and see if that stimulates a bowel movement.  I do not see an indication for admission.  I will speak with the  and see if we can get her placed in a shelter somewhere.    Pt will be given Lyft to [facility].  She hasn't been able to provide a stool specimen as of yet.  Will d/c to f/u and bring in stool sample if able and f/u.                                                 Medical Decision Making  Amount and/or Complexity of Data Reviewed  Labs: ordered.  Radiology: ordered.    Risk  Prescription drug management.        Final diagnoses:   Nausea vomiting and diarrhea   Lower abdominal pain   Gastroenteritis       ED Disposition  ED Disposition       ED Disposition   Discharge    Condition   Stable    Comment   --               Janessa Mccall, APRN  [address] [address] [phone]      call for follow up next week    [facility] EMERGENCY DEPARTMENT  [address] [place] [id number] [phone]    If symptoms worsen         Medication List        Changed      * ondansetron ODT 4 MG disintegrating tablet  Commonly known as: ZOFRAN-ODT  Place 1 tablet  on the tongue Every 6 (Six) Hours As Needed for Nausea.  What changed: Another medication with the same name was changed. Make sure you understand how and when to take each.     * ondansetron ODT 4 MG disintegrating tablet  Commonly known as: ZOFRAN-ODT  Place 1 tablet on the tongue Every 6 (Six) Hours As Needed for Nausea.  What changed:   when to take this  reasons to take this           * This list has 2 medication(s) that are the same as other medications prescribed for you. Read the directions carefully, and ask your doctor or other care provider to review them with you.                   Where to Get Your Medications        These medications were sent to [facility] [id number] - [place], [place] - [facility] [address] [facility] [address] - [phone]  - [phone] [address] [place], [facility] [address]      Phone: [phone]   ondansetron ODT 4 MG disintegrating tablet            Yosef Milton, PA  12/16/23 1514

## 2023-12-18 ENCOUNTER — APPOINTMENT (OUTPATIENT)
Dept: GENERAL RADIOLOGY | Facility: HOSPITAL | Age: 58
End: 2023-12-18
Payer: MEDICAID

## 2023-12-18 ENCOUNTER — HOSPITAL ENCOUNTER (EMERGENCY)
Facility: HOSPITAL | Age: 58
Discharge: HOME | End: 2023-12-18
Attending: EMERGENCY MEDICINE | Admitting: EMERGENCY MEDICINE
Payer: MEDICAID

## 2023-12-18 VITALS
HEART RATE: 67 BPM | RESPIRATION RATE: 15 BRPM | DIASTOLIC BLOOD PRESSURE: 60 MMHG | BODY MASS INDEX: 19.7 KG/M2 | SYSTOLIC BLOOD PRESSURE: 100 MMHG | OXYGEN SATURATION: 95 % | HEIGHT: 68 IN | TEMPERATURE: 97.8 F | WEIGHT: 130 LBS

## 2023-12-18 DIAGNOSIS — E87.6 HYPOKALEMIA: ICD-10-CM

## 2023-12-18 DIAGNOSIS — J40 BRONCHITIS: Primary | ICD-10-CM

## 2023-12-18 LAB
ANION GAP SERPL CALCULATED.3IONS-SCNC: 8 MMOL/L (ref 5–15)
BASOPHILS # BLD AUTO: 0.07 10*3/MM3 (ref 0–0.2)
BASOPHILS NFR BLD AUTO: 1 % (ref 0–1.5)
BUN SERPL-MCNC: 17 MG/DL (ref 6–20)
BUN/CREAT SERPL: 27.9 (ref 7–25)
CALCIUM SPEC-SCNC: 9.1 MG/DL (ref 8.6–10.5)
CHLORIDE SERPL-SCNC: 106 MMOL/L (ref 98–107)
CO2 SERPL-SCNC: 28 MMOL/L (ref 22–29)
CREAT SERPL-MCNC: 0.61 MG/DL (ref 0.57–1)
DEPRECATED RDW RBC AUTO: 42.5 FL (ref 37–54)
EGFRCR SERPLBLD CKD-EPI 2021: 103.8 ML/MIN/1.73
EOSINOPHIL # BLD AUTO: 0.37 10*3/MM3 (ref 0–0.4)
EOSINOPHIL NFR BLD AUTO: 5.3 % (ref 0.3–6.2)
ERYTHROCYTE [DISTWIDTH] IN BLOOD BY AUTOMATED COUNT: 12.8 % (ref 12.3–15.4)
GLUCOSE SERPL-MCNC: 107 MG/DL (ref 65–99)
HCT VFR BLD AUTO: 33.2 % (ref 34–46.6)
HGB BLD-MCNC: 10.7 G/DL (ref 12–15.9)
IMM GRANULOCYTES # BLD AUTO: 0.02 10*3/MM3 (ref 0–0.05)
IMM GRANULOCYTES NFR BLD AUTO: 0.3 % (ref 0–0.5)
LYMPHOCYTES # BLD AUTO: 0.99 10*3/MM3 (ref 0.7–3.1)
LYMPHOCYTES NFR BLD AUTO: 14.1 % (ref 19.6–45.3)
MCH RBC QN AUTO: 29.1 PG (ref 26.6–33)
MCHC RBC AUTO-ENTMCNC: 32.2 G/DL (ref 31.5–35.7)
MCV RBC AUTO: 90.2 FL (ref 79–97)
MONOCYTES # BLD AUTO: 0.59 10*3/MM3 (ref 0.1–0.9)
MONOCYTES NFR BLD AUTO: 8.4 % (ref 5–12)
NEUTROPHILS NFR BLD AUTO: 4.99 10*3/MM3 (ref 1.7–7)
NEUTROPHILS NFR BLD AUTO: 70.9 % (ref 42.7–76)
NRBC BLD AUTO-RTO: 0 /100 WBC (ref 0–0.2)
PLATELET # BLD AUTO: 259 10*3/MM3 (ref 140–450)
PMV BLD AUTO: 10.1 FL (ref 6–12)
POTASSIUM SERPL-SCNC: 3.2 MMOL/L (ref 3.5–5.2)
RBC # BLD AUTO: 3.68 10*6/MM3 (ref 3.77–5.28)
SODIUM SERPL-SCNC: 142 MMOL/L (ref 136–145)
WBC NRBC COR # BLD AUTO: 7.03 10*3/MM3 (ref 3.4–10.8)

## 2023-12-18 PROCEDURE — 99283 EMERGENCY DEPT VISIT LOW MDM: CPT

## 2023-12-18 PROCEDURE — 71045 X-RAY EXAM CHEST 1 VIEW: CPT

## 2023-12-18 PROCEDURE — 85025 COMPLETE CBC W/AUTO DIFF WBC: CPT | Performed by: EMERGENCY MEDICINE

## 2023-12-18 PROCEDURE — 36415 COLL VENOUS BLD VENIPUNCTURE: CPT

## 2023-12-18 PROCEDURE — 80048 BASIC METABOLIC PNL TOTAL CA: CPT | Performed by: EMERGENCY MEDICINE

## 2023-12-18 RX ORDER — POTASSIUM CHLORIDE 750 MG/1
20 CAPSULE, EXTENDED RELEASE ORAL ONCE
Status: COMPLETED | OUTPATIENT
Start: 2023-12-18 | End: 2023-12-18

## 2023-12-18 RX ORDER — SODIUM CHLORIDE 0.9 % (FLUSH) 0.9 %
10 SYRINGE (ML) INJECTION AS NEEDED
Status: DISCONTINUED | OUTPATIENT
Start: 2023-12-18 | End: 2023-12-18

## 2023-12-18 RX ADMIN — POTASSIUM CHLORIDE 20 MEQ: 750 CAPSULE, EXTENDED RELEASE ORAL at 05:07

## 2023-12-18 NOTE — ED PROVIDER NOTES
"Subjective   History of Present Illness  This is a 58-year-old female with past medical history of of anxiety presenting to the emergency department with a cough.  Patient states that she was seen yesterday diagnosed with pneumonia.  She was instructed to come back to the emergency department she was not feeling any better.  Patient states that she did not get her medications filled.  She is still having some mild cough.  Nonproductive in nature.  She does not have any hemoptysis.  No associated shortness of breath.  She denies any fevers or chills.  No headache or change in vision.  No focal weakness.  No abdominal pain or vomiting.    History provided by:  Patient   used: No        Review of Systems   Constitutional:  Negative for chills and fever.   HENT:  Negative for congestion, ear pain and sore throat.    Eyes:  Negative for visual disturbance.   Respiratory:  Positive for cough. Negative for shortness of breath.    Cardiovascular:  Negative for chest pain.   Gastrointestinal:  Negative for abdominal pain.   Genitourinary:  Negative for difficulty urinating.   Musculoskeletal:  Negative for arthralgias.   Skin:  Negative for rash.   Neurological:  Negative for dizziness, weakness and numbness.   Psychiatric/Behavioral:  Negative for agitation.        Past Medical History:   Diagnosis Date    Anxiety     Depression     Homelessness     Migraine     Obesity     Psoriasis        Allergies   Allergen Reactions    Acetaminophen Other (See Comments)     \"Makes me sleep a lot\"       Past Surgical History:   Procedure Laterality Date    NO PAST SURGERIES         Family History   Problem Relation Age of Onset    Lymphoma Mother         mom passed at 82 with lymphoma    Heart attack Father         dad passed at 63 with a heart attack    No Known Problems Sister     No Known Problems Brother     No Known Problems Daughter        Social History     Socioeconomic History    Marital status: Single "   Tobacco Use    Smoking status: Every Day     Packs/day: 0.50     Years: 38.00     Additional pack years: 0.00     Total pack years: 19.00     Types: Cigarettes     Start date: 1/1/1981    Smokeless tobacco: Never   Substance and Sexual Activity    Alcohol use: No    Drug use: No    Sexual activity: Not Currently           Objective   Physical Exam  Vitals and nursing note reviewed.   Constitutional:       General: She is not in acute distress.     Appearance: She is not ill-appearing or toxic-appearing.   HENT:      Mouth/Throat:      Pharynx: No posterior oropharyngeal erythema.   Eyes:      Conjunctiva/sclera: Conjunctivae normal.      Pupils: Pupils are equal, round, and reactive to light.   Cardiovascular:      Rate and Rhythm: Normal rate and regular rhythm.   Pulmonary:      Effort: Pulmonary effort is normal. No respiratory distress.   Abdominal:      General: Abdomen is flat. There is no distension.      Palpations: There is no mass.      Tenderness: There is no abdominal tenderness. There is no guarding or rebound.   Musculoskeletal:         General: No deformity. Normal range of motion.   Skin:     General: Skin is warm.      Findings: No rash.   Neurological:      General: No focal deficit present.      Mental Status: She is alert and oriented to person, place, and time.      Motor: No weakness.         Procedures           ED Course  ED Course as of 12/18/23 0405   Mon Dec 18, 2023   0317 BP: 145/74 [JK]   0317 Temp: 97.8 °F (36.6 °C) [JK]   0317 Temp src: Oral [JK]   0317 Heart Rate: 81 [JK]   0317 Resp: 15 [JK]   0317 SpO2: 98 %  Patient's repeat vitals, telemetry tracing, and pulse oximetry tracing were directly viewed and interpreted by myself.  Patient is hemodynamically stable [JK]   0317 XR Chest 1 View  Imaging was directly visualized by myself, per my interpretations, chest x-ray normal. [JK]   0404 CBC & Differential(!) [JK]   0404 Basic Metabolic Panel(!)  Laboratory studies were reviewed  and interpreted directly by myself.  CBC shows some chronic anemia with a hemoglobin of 10.7, BMP showed some minor hypokalemia with potassium 3.2 [JK]   0404 On reevaluation, patient is feeling better.  No respiratory distress.  Symptoms consistent with bronchitis.  Patient follow-up with PCP.  Strict return precautions.  Verbalized understanding. [JK]   0409 I had a discussion with the patient/family regarding diagnosis, diagnostic results, treatment plan, and medications. The patient/family indicated understanding of these instructions. I spent adequate time at the bedside prior to discharge necessary to discuss the aftercare instructions, giving patient education, providing explanations of the results of our evaluations/findings, and my decision making to assure that the patient/family understand the plan of care. Time was allotted to answer questions at that time and throughout the ED course. Patient is required to maintain timely follow up, as discussed. I also discussed the potential for the development of an acute emergent condition requiring further evaluation, return to the ER, admission, or even surgical intervention.  I encouraged the patient to return to the emergency department immediately for any concerns, worsening symptoms, new complaints, or if symptoms persist and they are unable to seek follow-up in a timely fashion. The patient/family expressed understanding and agreement with this plan    Shared decision making:   After full review of the patient's clinical presentation, review of any work-up including but not limited to laboratory studies and radiology obtained, I had a discussion with the patient.  Treatment options were discussed as well as the risks, benefits and consequences.  I discussed all findings with the patient and family members if available.  During the discussion, treatment goals were understood by all as well as any misconceptions which were addressed with the patient.  Ample  time was given for any questions they may have had.  They are in agreement with the treatment plan as well as final disposition. [JK]      ED Course User Index  [JK] Ellis Maria MD                                             Medical Decision Making  This is a 58-year-old female with a history of anxiety presenting with a cough.  The patient symptoms seem consistent with bronchitis.  She does admit to smoking.  Overall the patient appears nontoxic.  Afebrile.  She is hemodynamically stable.  No respiratory distress.  Workup initiated.      Differential diagnosis: Bronchitis, pneumonia, URI, COPD      Amount and/or Complexity of Data Reviewed  External Data Reviewed: labs, radiology and notes.     Details: External laboratories, imaging as well as notes were reviewed personally by myself.  All relevant studies were used to guide decision making.     Date of previous record: 12/16/2023    Source of note:  ER    Summary: Patient was seen and evaluated for shortness of breath.  I did review patient laboratory studies on file as well as a chest x-ray.  Records reviewed    Labs: ordered. Decision-making details documented in ED Course.  Radiology: ordered and independent interpretation performed. Decision-making details documented in ED Course.    Risk  Prescription drug management.        Final diagnoses:   Bronchitis   Hypokalemia       ED Disposition  ED Disposition       ED Disposition   Discharge    Condition   Stable    Comment   --               Janessa Mccall, APRN  [address] [address]  [phone]    Call in 1 day           Medication List      No changes were made to your prescriptions during this visit.            Ellis Maria MD  12/18/23 6165

## 2023-12-28 ENCOUNTER — HOSPITAL ENCOUNTER (EMERGENCY)
Facility: HOSPITAL | Age: 58
Discharge: HOME | End: 2023-12-28
Attending: STUDENT IN AN ORGANIZED HEALTH CARE EDUCATION/TRAINING PROGRAM
Payer: MEDICAID

## 2023-12-28 VITALS
RESPIRATION RATE: 18 BRPM | SYSTOLIC BLOOD PRESSURE: 167 MMHG | TEMPERATURE: 97.4 F | WEIGHT: 130 LBS | HEART RATE: 89 BPM | OXYGEN SATURATION: 96 % | BODY MASS INDEX: 19.7 KG/M2 | DIASTOLIC BLOOD PRESSURE: 78 MMHG | HEIGHT: 68 IN

## 2023-12-28 DIAGNOSIS — B34.9 VIRAL ILLNESS: Primary | ICD-10-CM

## 2023-12-28 PROCEDURE — 99283 EMERGENCY DEPT VISIT LOW MDM: CPT

## 2023-12-28 NOTE — ED PROVIDER NOTES
"Subjective   History of Present Illness  Patient has been in the lobby for 3 hours.  Recently seen shortly prior to arrival at .  Reportedly had a negative COVID and strep test.  She reports a slight cough.  No difficulty breathing.  No chest pain no fever or chills.  Walking around the ER without difficulty carrying her bags.  History of homelessness.  She tells me she has a place to stay.        Review of Systems    Past Medical History:   Diagnosis Date    Anxiety     Depression     Homelessness     Migraine     Obesity     Psoriasis        Allergies   Allergen Reactions    Acetaminophen Other (See Comments)     \"Makes me sleep a lot\"       Past Surgical History:   Procedure Laterality Date    NO PAST SURGERIES         Family History   Problem Relation Age of Onset    Lymphoma Mother         mom passed at 82 with lymphoma    Heart attack Father         dad passed at 63 with a heart attack    No Known Problems Sister     No Known Problems Brother     No Known Problems Daughter        Social History     Socioeconomic History    Marital status: Single   Tobacco Use    Smoking status: Every Day     Packs/day: 0.50     Years: 38.00     Additional pack years: 0.00     Total pack years: 19.00     Types: Cigarettes     Start date: 1/1/1981    Smokeless tobacco: Never   Substance and Sexual Activity    Alcohol use: No    Drug use: No    Sexual activity: Not Currently           Objective   Physical Exam  Constitutional:       Appearance: She is well-developed.   HENT:      Head: Normocephalic and atraumatic.      Right Ear: External ear normal.      Left Ear: External ear normal.      Nose: Nose normal.   Eyes:      Conjunctiva/sclera: Conjunctivae normal.      Pupils: Pupils are equal, round, and reactive to light.   Cardiovascular:      Rate and Rhythm: Normal rate and regular rhythm.      Heart sounds: Normal heart sounds.   Pulmonary:      Effort: Pulmonary effort is normal.      Breath sounds: Normal breath sounds. "   Abdominal:      General: Bowel sounds are normal.      Palpations: Abdomen is soft.   Musculoskeletal:         General: Normal range of motion.      Cervical back: Normal range of motion and neck supple.   Skin:     General: Skin is warm and dry.   Neurological:      Mental Status: She is alert and oriented to person, place, and time.   Psychiatric:         Behavior: Behavior normal.         Thought Content: Thought content normal.         Judgment: Judgment normal.         Procedures           ED Course  ED Course as of 12/28/23 0747   Thu Dec 28, 2023   0746 Patient is nontoxic-appearing.  Resting company in the ER lobby for many hours.  Vital signs are stable.  Walking around the ER caring her beds without difficulty. [JM]      ED Course User Index  [SHEREE] Jose L Carter APRN                                             Medical Decision Making  Problems Addressed:  Viral illness: acute illness or injury        Final diagnoses:   Viral illness       ED Disposition  ED Disposition       ED Disposition   Discharge    Condition   Stable    Comment   --               Janessa Mccall APRN  [address] [address]  [phone]    Schedule an appointment as soon as possible for a visit       [facility] EMERGENCY DEPARTMENT  [address] [place] [id number] [phone]    If symptoms worsen         Medication List      No changes were made to your prescriptions during this visit.            Jose L Carter APRN  12/28/23 4553

## 2024-01-24 ENCOUNTER — APPOINTMENT (OUTPATIENT)
Dept: GENERAL RADIOLOGY | Facility: HOSPITAL | Age: 59
End: 2024-01-24
Payer: MEDICAID

## 2024-01-24 ENCOUNTER — HOSPITAL ENCOUNTER (OUTPATIENT)
Facility: HOSPITAL | Age: 59
Setting detail: OBSERVATION
Discharge: HOME | End: 2024-01-30
Attending: EMERGENCY MEDICINE | Admitting: INTERNAL MEDICINE
Payer: MEDICAID

## 2024-01-24 DIAGNOSIS — Z74.09 IMPAIRED MOBILITY: ICD-10-CM

## 2024-01-24 DIAGNOSIS — M79.604 PAIN IN BOTH LOWER EXTREMITIES: ICD-10-CM

## 2024-01-24 DIAGNOSIS — L03.115 BILATERAL LOWER LEG CELLULITIS: Primary | ICD-10-CM

## 2024-01-24 DIAGNOSIS — F41.8 ANXIETY ASSOCIATED WITH DEPRESSION: ICD-10-CM

## 2024-01-24 DIAGNOSIS — L03.116 BILATERAL LOWER LEG CELLULITIS: Primary | ICD-10-CM

## 2024-01-24 DIAGNOSIS — Z59.00 HOMELESSNESS: ICD-10-CM

## 2024-01-24 DIAGNOSIS — M79.605 PAIN IN BOTH LOWER EXTREMITIES: ICD-10-CM

## 2024-01-24 PROBLEM — L08.0 ECTHYMA: Status: ACTIVE | Noted: 2024-01-24

## 2024-01-24 PROBLEM — M79.606 LEG PAIN: Status: ACTIVE | Noted: 2024-01-24

## 2024-01-24 PROBLEM — L40.9 PSORIASIS: Status: ACTIVE | Noted: 2024-01-24

## 2024-01-24 LAB
ALBUMIN SERPL-MCNC: 3.7 G/DL (ref 3.5–5.2)
ALBUMIN/GLOB SERPL: 1 G/DL
ALP SERPL-CCNC: 94 U/L (ref 39–117)
ALT SERPL W P-5'-P-CCNC: 11 U/L (ref 1–33)
ANION GAP SERPL CALCULATED.3IONS-SCNC: 12 MMOL/L (ref 5–15)
AST SERPL-CCNC: 16 U/L (ref 1–32)
BASOPHILS # BLD AUTO: 0.06 10*3/MM3 (ref 0–0.2)
BASOPHILS NFR BLD AUTO: 0.5 % (ref 0–1.5)
BILIRUB SERPL-MCNC: 0.2 MG/DL (ref 0–1.2)
BUN SERPL-MCNC: 19 MG/DL (ref 6–20)
BUN/CREAT SERPL: 28.4 (ref 7–25)
CALCIUM SPEC-SCNC: 9 MG/DL (ref 8.6–10.5)
CHLORIDE SERPL-SCNC: 100 MMOL/L (ref 98–107)
CO2 SERPL-SCNC: 26 MMOL/L (ref 22–29)
CREAT SERPL-MCNC: 0.67 MG/DL (ref 0.57–1)
DEPRECATED RDW RBC AUTO: 43.3 FL (ref 37–54)
EGFRCR SERPLBLD CKD-EPI 2021: 100.8 ML/MIN/1.73
EOSINOPHIL # BLD AUTO: 0.24 10*3/MM3 (ref 0–0.4)
EOSINOPHIL NFR BLD AUTO: 2.2 % (ref 0.3–6.2)
ERYTHROCYTE [DISTWIDTH] IN BLOOD BY AUTOMATED COUNT: 13 % (ref 12.3–15.4)
GLOBULIN UR ELPH-MCNC: 3.7 GM/DL
GLUCOSE SERPL-MCNC: 116 MG/DL (ref 65–99)
HCT VFR BLD AUTO: 36.7 % (ref 34–46.6)
HGB BLD-MCNC: 11.7 G/DL (ref 12–15.9)
HOLD SPECIMEN: NORMAL
IMM GRANULOCYTES # BLD AUTO: 0.06 10*3/MM3 (ref 0–0.05)
IMM GRANULOCYTES NFR BLD AUTO: 0.5 % (ref 0–0.5)
LYMPHOCYTES # BLD AUTO: 0.78 10*3/MM3 (ref 0.7–3.1)
LYMPHOCYTES NFR BLD AUTO: 7.1 % (ref 19.6–45.3)
MCH RBC QN AUTO: 28.7 PG (ref 26.6–33)
MCHC RBC AUTO-ENTMCNC: 31.9 G/DL (ref 31.5–35.7)
MCV RBC AUTO: 90.2 FL (ref 79–97)
MONOCYTES # BLD AUTO: 0.88 10*3/MM3 (ref 0.1–0.9)
MONOCYTES NFR BLD AUTO: 8 % (ref 5–12)
NEUTROPHILS NFR BLD AUTO: 8.97 10*3/MM3 (ref 1.7–7)
NEUTROPHILS NFR BLD AUTO: 81.7 % (ref 42.7–76)
NRBC BLD AUTO-RTO: 0 /100 WBC (ref 0–0.2)
PLATELET # BLD AUTO: 380 10*3/MM3 (ref 140–450)
PMV BLD AUTO: 10.1 FL (ref 6–12)
POTASSIUM SERPL-SCNC: 3.7 MMOL/L (ref 3.5–5.2)
PROT SERPL-MCNC: 7.4 G/DL (ref 6–8.5)
RBC # BLD AUTO: 4.07 10*6/MM3 (ref 3.77–5.28)
SODIUM SERPL-SCNC: 138 MMOL/L (ref 136–145)
WBC NRBC COR # BLD AUTO: 10.99 10*3/MM3 (ref 3.4–10.8)
WHOLE BLOOD HOLD COAG: NORMAL
WHOLE BLOOD HOLD SPECIMEN: NORMAL

## 2024-01-24 PROCEDURE — G0378 HOSPITAL OBSERVATION PER HR: HCPCS

## 2024-01-24 PROCEDURE — 25010000002 ENOXAPARIN PER 10 MG: Performed by: HOSPITALIST

## 2024-01-24 PROCEDURE — 73620 X-RAY EXAM OF FOOT: CPT

## 2024-01-24 PROCEDURE — 99222 1ST HOSP IP/OBS MODERATE 55: CPT | Performed by: HOSPITALIST

## 2024-01-24 PROCEDURE — 73590 X-RAY EXAM OF LOWER LEG: CPT

## 2024-01-24 PROCEDURE — 85025 COMPLETE CBC W/AUTO DIFF WBC: CPT | Performed by: PHYSICIAN ASSISTANT

## 2024-01-24 PROCEDURE — 96372 THER/PROPH/DIAG INJ SC/IM: CPT

## 2024-01-24 PROCEDURE — 99284 EMERGENCY DEPT VISIT MOD MDM: CPT

## 2024-01-24 PROCEDURE — 80053 COMPREHEN METABOLIC PANEL: CPT | Performed by: PHYSICIAN ASSISTANT

## 2024-01-24 RX ORDER — AMOXICILLIN 250 MG
2 CAPSULE ORAL 2 TIMES DAILY
Status: DISCONTINUED | OUTPATIENT
Start: 2024-01-24 | End: 2024-01-30 | Stop reason: HOSPADM

## 2024-01-24 RX ORDER — ENOXAPARIN SODIUM 100 MG/ML
40 INJECTION SUBCUTANEOUS DAILY
Status: DISCONTINUED | OUTPATIENT
Start: 2024-01-24 | End: 2024-01-30 | Stop reason: HOSPADM

## 2024-01-24 RX ORDER — BISACODYL 10 MG
10 SUPPOSITORY, RECTAL RECTAL DAILY PRN
Status: DISCONTINUED | OUTPATIENT
Start: 2024-01-24 | End: 2024-01-30 | Stop reason: HOSPADM

## 2024-01-24 RX ORDER — BISACODYL 5 MG/1
5 TABLET, DELAYED RELEASE ORAL DAILY PRN
Status: DISCONTINUED | OUTPATIENT
Start: 2024-01-24 | End: 2024-01-30 | Stop reason: HOSPADM

## 2024-01-24 RX ORDER — SODIUM CHLORIDE 0.9 % (FLUSH) 0.9 %
10 SYRINGE (ML) INJECTION EVERY 12 HOURS SCHEDULED
Status: DISCONTINUED | OUTPATIENT
Start: 2024-01-24 | End: 2024-01-30 | Stop reason: HOSPADM

## 2024-01-24 RX ORDER — AMOXICILLIN AND CLAVULANATE POTASSIUM 875; 125 MG/1; MG/1
1 TABLET, FILM COATED ORAL EVERY 12 HOURS SCHEDULED
Status: DISCONTINUED | OUTPATIENT
Start: 2024-01-24 | End: 2024-01-29

## 2024-01-24 RX ORDER — SODIUM CHLORIDE 0.9 % (FLUSH) 0.9 %
10 SYRINGE (ML) INJECTION AS NEEDED
Status: DISCONTINUED | OUTPATIENT
Start: 2024-01-24 | End: 2024-01-30 | Stop reason: HOSPADM

## 2024-01-24 RX ORDER — ONDANSETRON 4 MG/1
4 TABLET, ORALLY DISINTEGRATING ORAL EVERY 6 HOURS PRN
Status: DISCONTINUED | OUTPATIENT
Start: 2024-01-24 | End: 2024-01-30 | Stop reason: HOSPADM

## 2024-01-24 RX ORDER — IBUPROFEN 600 MG/1
600 TABLET ORAL EVERY 6 HOURS PRN
Status: DISCONTINUED | OUTPATIENT
Start: 2024-01-24 | End: 2024-01-30 | Stop reason: HOSPADM

## 2024-01-24 RX ORDER — SODIUM CHLORIDE 9 MG/ML
40 INJECTION, SOLUTION INTRAVENOUS AS NEEDED
Status: DISCONTINUED | OUTPATIENT
Start: 2024-01-24 | End: 2024-01-30 | Stop reason: HOSPADM

## 2024-01-24 RX ORDER — POLYETHYLENE GLYCOL 3350 17 G/17G
17 POWDER, FOR SOLUTION ORAL DAILY PRN
Status: DISCONTINUED | OUTPATIENT
Start: 2024-01-24 | End: 2024-01-30 | Stop reason: HOSPADM

## 2024-01-24 RX ORDER — CEPHALEXIN 500 MG/1
500 CAPSULE ORAL 4 TIMES DAILY
Qty: 40 CAPSULE | Refills: 0 | Status: SHIPPED | OUTPATIENT
Start: 2024-01-24 | End: 2024-01-30 | Stop reason: HOSPADM

## 2024-01-24 RX ORDER — SULFAMETHOXAZOLE AND TRIMETHOPRIM 800; 160 MG/1; MG/1
1 TABLET ORAL 2 TIMES DAILY
Qty: 20 TABLET | Refills: 0 | Status: SHIPPED | OUTPATIENT
Start: 2024-01-24 | End: 2024-01-30 | Stop reason: HOSPADM

## 2024-01-24 RX ORDER — NITROGLYCERIN 0.4 MG/1
0.4 TABLET SUBLINGUAL
Status: DISCONTINUED | OUTPATIENT
Start: 2024-01-24 | End: 2024-01-30 | Stop reason: HOSPADM

## 2024-01-24 RX ORDER — SULFAMETHOXAZOLE AND TRIMETHOPRIM 800; 160 MG/1; MG/1
1 TABLET ORAL EVERY 12 HOURS SCHEDULED
Status: DISCONTINUED | OUTPATIENT
Start: 2024-01-24 | End: 2024-01-29

## 2024-01-24 RX ORDER — ONDANSETRON 2 MG/ML
4 INJECTION INTRAMUSCULAR; INTRAVENOUS EVERY 6 HOURS PRN
Status: DISCONTINUED | OUTPATIENT
Start: 2024-01-24 | End: 2024-01-30 | Stop reason: HOSPADM

## 2024-01-24 RX ADMIN — ENOXAPARIN SODIUM 40 MG: 100 INJECTION SUBCUTANEOUS at 18:33

## 2024-01-24 RX ADMIN — SULFAMETHOXAZOLE AND TRIMETHOPRIM 1 TABLET: 800; 160 TABLET ORAL at 20:46

## 2024-01-24 RX ADMIN — AMOXICILLIN AND CLAVULANATE POTASSIUM 1 TABLET: 875; 125 TABLET, FILM COATED ORAL at 20:46

## 2024-01-24 RX ADMIN — MUPIROCIN 1 APPLICATION: 20 OINTMENT TOPICAL at 21:18

## 2024-01-24 RX ADMIN — MUPIROCIN 1 APPLICATION: 20 OINTMENT TOPICAL at 14:43

## 2024-01-24 RX ADMIN — IBUPROFEN 600 MG: 600 TABLET, FILM COATED ORAL at 21:18

## 2024-01-24 RX ADMIN — Medication 10 ML: at 21:27

## 2024-01-24 SDOH — ECONOMIC STABILITY - HOUSING INSECURITY: HOMELESSNESS UNSPECIFIED: Z59.00

## 2024-01-24 NOTE — H&P
[facility] Medicine Services  HISTORY AND PHYSICAL    Patient Name: Keyla Strong  : 1965  MRN: [id number]  Primary Care Physician: Janessa Mccall APRN  Date of admission: 2024      Subjective   Subjective     Chief Complaint:  Leg pain    HPI:  Keyla Strong is a 59 y.o. female with a past medical history of schizophrenia, psoriasis and current homelessness that presented to the ED complaining of b/l leg pain. She states she went to [facility] ED yesterday for the same issue and was discharged with PO antibiotics but her legs were hurting too much to go and pick them up. She states her leg pain has been present for several days along with foot pain. She states it has worsened over the last 24 hours. She states she is unable to bear weight due to pain in her leg and feet. She thinks her psoriatic lesions have now become infected. The  in the ED tried to get the patient discharged from the ED on PO antibiotics to a homeless shelter but the shelters would not take her, stating she was not allowed back due to prior actions. The patient will be admitted for further evaluation and treatment.      Personal History     Past Medical History:   Diagnosis Date    Anxiety     Depression     Homelessness     Migraine     Obesity     Psoriasis            Past Surgical History:   Procedure Laterality Date    NO PAST SURGERIES         Family History: her family history includes Heart attack in her father; Lymphoma in her mother; No Known Problems in her brother, daughter, and sister.     Social History:  reports that she has been smoking cigarettes. She started smoking about 43 years ago. She has a 19.00 pack-year smoking history. She has never used smokeless tobacco. She reports that she does not drink alcohol and does not use drugs.  Social History     Social History Narrative    Patient is disabled for anxiety     She lives with her 2 daughters.        Medications:  Available  "home medication information reviewed.  cephalexin and sulfamethoxazole-trimethoprim    Allergies   Allergen Reactions    Acetaminophen Other (See Comments)     \"Makes me sleep a lot\"       Objective   Objective     Vital Signs:   Temp:  [97.9 °F (36.6 °C)] 97.9 °F (36.6 °C)  Heart Rate:  [] 92  Resp:  [18] 18  BP: (118-136)/(50-90) 118/50       Physical Exam   Constitutional: Awake, alert  Eyes: PERRLA, sclerae anicteric, no conjunctival injection  HENT: NCAT, mucous membranes dry, poor dentition  Neck: Supple, no thyromegaly, no lymphadenopathy, trachea midline  Respiratory: Clear to auscultation bilaterally, nonlabored respirations   Cardiovascular: RRR, no murmurs, rubs, or gallops, palpable pedal pulses bilaterally  Gastrointestinal: Positive bowel sounds, soft, nontender, nondistended  Musculoskeletal: No bilateral ankle edema, no clubbing or cyanosis to extremities  Psychiatric: Appropriate affect, cooperative  Neurologic: Oriented x 3, strength symmetric in all extremities, Cranial Nerves grossly intact to confrontation, speech clear  Skin: b/l LE psoriatic appearing lesions with pustular drainage from anterior left shin, tender to palpation          Result Review:  I have personally reviewed the results from the time of this admission to 1/24/2024 15:59 EST and agree with these findings:  [x]  Laboratory list / accordion  []  Microbiology  [x]  Radiology  []  EKG/Telemetry   []  Cardiology/Vascular   []  Pathology  []  Old records  []  Other:      LAB RESULTS:      Lab 01/24/24  1130 01/23/24  2035   WBC 10.99* 14.19*   HEMOGLOBIN 11.7* 10.8*   HEMATOCRIT 36.7 32.8*   PLATELETS 380 345   NEUTROS ABS 8.97* 11.77*   IMMATURE GRANS (ABS) 0.06* 0.05   LYMPHS ABS 0.78 1.01*   MONOS ABS 0.88 1.09*   EOS ABS 0.24 0.22   MCV 90.2 89         Lab 01/24/24  1130   SODIUM 138   POTASSIUM 3.7   CHLORIDE 100   CO2 26.0   ANION GAP 12.0   BUN 19   CREATININE 0.67   EGFR 100.8   GLUCOSE 116*   CALCIUM 9.0         Lab " 01/24/24  1130   TOTAL PROTEIN 7.4   ALBUMIN 3.7   GLOBULIN 3.7   ALT (SGPT) 11   AST (SGOT) 16   BILIRUBIN 0.2   ALK PHOS 94                     UA          12/16/2023    13:36   Urinalysis   Specific [place], UA 1.052    Ketones, UA Negative    Blood, UA Negative    Leukocytes, UA Negative    Nitrite, UA Negative        Microbiology Results (last 10 days)       Procedure Component Value - Date/Time    COVID-19, FLU A/B, RSV PCR 1 HR TAT - , [[id number]]  (Normal) Collected: 01/23/24 2044    Lab Status: Final result Specimen: Swab from Nasopharynx Updated: 01/24/24 1115     SARS-CoV-2, KARLA Not Detected     Influenza A PCR Not Detected     Influenza B PCR Not Detected     RSV, PCR Not Detected    Narrative:      This assay is for in vitro diagnostic use under FDA emergency use authorization only. Negative results do not preclude infection with the SARS CoV-2 virus and should not be the sole basis of a patient treatment/management or public health decision. Follow up testing should be performed according to the current CDC recommendations.  This test was performed on the Xpert Xpress SARS CoV-2 Plus assay test, a PCR-based method.  Negative results should be considered presumptive and do not preclude current or future infection obtained through community transmission or other exposures. Negative results must be considered in the context of an individual's recent exposures, history, presence of clinical signs and symptoms consistent with COVID-19.            XR Tibia Fibula 2 View Bilateral    Result Date: 1/24/2024  XR TIBIA FIBULA 2 VW BILATERAL Date of Exam: 1/24/2024 12:14 PM EST Indication: bilateral lower leg pain Comparison: None available. Findings: No evidence of acute osseous abnormality. No visualized joint malalignment. No radiopaque foreign body. There may be generalized subcutaneous edema. Asymmetric decreased muscle bulk on the left compared to the right. Apparent soft tissue irregularity at the medial  "aspect of the high left ankle.     Impression: Impression: No evidence of acute osseous abnormality. Apparent soft tissue irregularity at the medial aspect of the high left ankle. Suspected generalized subcutaneous edema bilaterally. No radiopaque foreign body. Recommend correlation with exam. Electronically Signed: Brock Payne MD  1/24/2024 12:44 PM EST  Workstation ID: [id number]    XR Foot 2 View Bilateral    Result Date: 1/24/2024  XR FOOT 2 VW BILATERAL Date of Exam: 1/24/2024 12:14 PM EST Indication: bilateral foot pain Comparison: 8/14/2021 radiographs Findings: There is no evidence of fracture. Bilateral type I accessory navicular bones. Plantar and dorsal calcaneal enthesopathy with fragmentation on the right. Normal joint alignment. There is cutaneous irregularity along the medial aspect of the left lower leg, partially visualized..     Impression: Impression: No evidence of fracture or acute osseous abnormality. Cutaneous irregularity along the medial aspect of the left lower leg, which is partially visualized. Electronically Signed: Alex Whitney MD  1/24/2024 12:42 PM EST  Workstation ID: [id number]         Assessment & Plan   Assessment & Plan       Leg pain    Psoriasis    Homeless    Anxiety associated with depression    Keyla Strong is a 59 y.o. female with a past medical history of schizophrenia, psoriasis and current homelessness that presented to the ED complaining of b/l leg pain.     Psoriasis vs Ecthyma  Leg Pain  - wrap b/l shins  - WOC  - could be strep or staph  - Bactrim plus Augmentin for coverage  - tib/fib xray b/l: Apparent soft tissue irregularity at the medial aspect of the high left ankle. Suspected generalized subcutaneous edema bilaterally. No radiopaque foreign body.   - PT/OT    Homelessness  - CM in ED tried to get patient discharged to homeless shelter from ED and no shelter would take her due to h/o \"prior actions\"  - patient ready for discharge on PO antibiotics as soon as " safe discharge plan is arranged  - consult social work/CM    Anxiety with Depression  Schizophrenia   - no home meds      DVT prophylaxis:  Medical DVT prophylaxis orders are signed and held.            CODE STATUS:    Code Status and Medical Interventions:   Ordered at: 01/24/24 1550     Level Of Support Discussed With:    Patient     Code Status (Patient has no pulse and is not breathing):    CPR (Attempt to Resuscitate)     Medical Interventions (Patient has pulse or is breathing):    Full Support       Expected Discharge   Expected discharge date/ time has not been documented.     Mai Viera, DO  01/24/24

## 2024-01-24 NOTE — ED PROVIDER NOTES
"Subjective   History of Present Illness  Ms. Strong is a 59 year old homeless female with history of schizophrenia who presents to the ED via EMS with complaints of bilateral lower leg and foot pain for several days, worse over past 24 hours.  Pt states she can't bear weight due to pain in feet and legs.  She states she has psoriasis and her lesions have gotten infected.  She was seen at  ER yesterday and prescribed antibiotics but states she was unable to get to pharmacy to pick them up as she could not walk and has no transportation.  She denies fever.        Review of Systems   Constitutional:  Negative for chills and fever.   HENT:  Negative for sore throat.    Respiratory:  Negative for cough and shortness of breath.    Cardiovascular:  Negative for chest pain.   Gastrointestinal:  Negative for abdominal pain, diarrhea, nausea and vomiting.   Genitourinary:  Negative for dysuria.   Musculoskeletal:  Positive for arthralgias (bilateral foot and lower leg pain).   Skin:  Positive for rash (psoriatic rash on both lower legs with several pustular lesions that are stuck to her leggings.  Very painful to touch.).   Allergic/Immunologic: Negative for immunocompromised state.   Neurological:  Negative for numbness.   Hematological:  Does not bruise/bleed easily.   Psychiatric/Behavioral:  Negative for confusion.        Past Medical History:   Diagnosis Date    Anxiety     Depression     Homelessness     Migraine     Obesity     Psoriasis        Allergies   Allergen Reactions    Acetaminophen Other (See Comments)     \"Makes me sleep a lot\"       Past Surgical History:   Procedure Laterality Date    NO PAST SURGERIES         Family History   Problem Relation Age of Onset    Lymphoma Mother         mom passed at 82 with lymphoma    Heart attack Father         dad passed at 63 with a heart attack    No Known Problems Sister     No Known Problems Brother     No Known Problems Daughter        Social History "     Socioeconomic History    Marital status: Single   Tobacco Use    Smoking status: Every Day     Packs/day: 0.50     Years: 38.00     Additional pack years: 0.00     Total pack years: 19.00     Types: Cigarettes     Start date: 1/1/1981    Smokeless tobacco: Never   Substance and Sexual Activity    Alcohol use: No    Drug use: No    Sexual activity: Not Currently           Objective   Physical Exam  Constitutional:       Comments: Unkempt appearance.     HENT:      Head: Normocephalic.      Nose: Nose normal.      Mouth/Throat:      Mouth: Mucous membranes are moist.   Eyes:      Conjunctiva/sclera: Conjunctivae normal.      Pupils: Pupils are equal, round, and reactive to light.   Cardiovascular:      Rate and Rhythm: Normal rate and regular rhythm.      Pulses: Normal pulses.   Pulmonary:      Effort: Pulmonary effort is normal.      Breath sounds: Normal breath sounds.   Abdominal:      Tenderness: There is no abdominal tenderness.   Musculoskeletal:      Cervical back: Neck supple.      Comments: Multiple bilateral lower leg psoriatic lesions, some with pustular drainage.  Very tender to touch.       Skin:     Comments: Psoriatic lesions both lower legs with several pustular lesions noted.     Neurological:      Mental Status: She is alert and oriented to person, place, and time.   Psychiatric:         Mood and Affect: Mood normal.                   Procedures           ED Course      In summary, 59 yr old homeless female with hx of schizophrenia who presents via EMS for pain and lesions both lower legs and feet.  Pt states she is unable to walk due to pain.  Was seen at  yesterday for same and prescribed antibiotics and discharged.  Pt unable to get antibiotics due to inability to walk secondary to pain.      MDM:  Pt has psoriatic lesions both lower legs, several with pustular drainage.  Will get basic labs and some xrays of the bilateral tib/fibs and feet.  I have concern about pt's ability to adequately  self care for her infected psoriatic lesions given her homelessness and her inability to walk due to pain.  If she were reliable, I think oral antibiotics and wound care would be reasonable but I don't feel comfortable with discharging her in her current state.      White count is 10.99.  H&H 11.7/36.7.  Glucose 116.  Normal chemistries.     Bilateral tibia/fibula xrays:  No evidence of acute osseous abnormality.     Apparent soft tissue irregularity at the medial aspect of the high left ankle. Suspected generalized subcutaneous edema bilaterally. No radiopaque foreign body. Recommend correlation with exam.    Will give dose of IV rocephin and vancomycin and discuss with hospitalist for possible admission.    Discussed with hospitalist (Dr. Foy) who felt outpatient tx with oral antibiotic and wound care was appropriate.  Will discuss with  to get her the meds and refer to wound care.  Will have RN cleanse and dress wounds.    14:41 EST  Advised by  that they are unable to get her into a shelter anywhere and the pt is unable to ambulate and will require admission.  Will let hospitalist know and get her admitted.                                                               Medical Decision Making  Problems Addressed:  Bilateral lower leg cellulitis: complicated acute illness or injury  Homelessness: complicated acute illness or injury  Impaired mobility: complicated acute illness or injury    Amount and/or Complexity of Data Reviewed  Labs: ordered.  Radiology: ordered.    Risk  Prescription drug management.  Decision regarding hospitalization.        Final diagnoses:   Bilateral lower leg cellulitis   Homelessness   Impaired mobility       ED Disposition  ED Disposition       ED Disposition   Decision to Admit    Condition   --    Comment   --               No follow-up provider specified.       Medication List        New Prescriptions      cephalexin 500 MG capsule  Commonly known as:  KEFLEX  Take 1 capsule by mouth 4 (Four) Times a Day.     sulfamethoxazole-trimethoprim 800-160 MG per tablet  Commonly known as: BACTRIM DS,SEPTRA DS  Take 1 tablet by mouth 2 (Two) Times a Day.               Where to Get Your Medications        These medications were sent to [facility] [id number] - [place], [place] - [address] AT [facility] & [address] - [phone]  - [phone] [facility] [facility] , [facility] [address]      Phone: [phone]   cephalexin 500 MG capsule  sulfamethoxazole-trimethoprim 800-160 MG per tablet            Yosef Milton PA  01/24/24 1313       Yosef Milton PA  01/24/24 5547

## 2024-01-24 NOTE — ED NOTES
" Keyla Moctezumarubi    Nursing Report ED to Floor:  Mental status: A&O x 4  Ambulatory status: up with assist x 1  Oxygen Therapy:  room air  Cardiac Rhythm: normal sinus  Admitted from: ED  Safety Concerns:  none  Social Issues: none  ED Room #:  03    ED Nurse Phone Extension - [phone] or may call [phone].      HPI:   Chief Complaint   Patient presents with    Foot Pain       Past Medical History:  Past Medical History:   Diagnosis Date    Anxiety     Depression     Homelessness     Migraine     Obesity     Psoriasis         Past Surgical History:  Past Surgical History:   Procedure Laterality Date    NO PAST SURGERIES          Admitting Doctor:   Mai Viera DO    Consulting Provider(s):  Consults       No orders found from 12/26/2023 to 1/25/2024.             Admitting Diagnosis:   The primary encounter diagnosis was Bilateral lower leg cellulitis. Diagnoses of Homelessness and Impaired mobility were also pertinent to this visit.    Most Recent Vitals:   Vitals:    01/24/24 1117 01/24/24 1121 01/24/24 1230   BP: 136/90  118/50   Pulse: 106  92   Resp: 18     Temp:  97.9 °F (36.6 °C)    TempSrc:  Oral    SpO2:  100% 100%   Weight:  59 kg (130 lb)    Height:  157.5 cm (62\")        Active LDAs/IV Access:   Lines, Drains & Airways       Active LDAs       None                    Labs (abnormal labs have a star):   Labs Reviewed   COMPREHENSIVE METABOLIC PANEL - Abnormal; Notable for the following components:       Result Value    Glucose 116 (*)     BUN/Creatinine Ratio 28.4 (*)     All other components within normal limits    Narrative:     GFR Normal >60  Chronic Kidney Disease <60  Kidney Failure <15     CBC WITH AUTO DIFFERENTIAL - Abnormal; Notable for the following components:    WBC 10.99 (*)     Hemoglobin 11.7 (*)     Neutrophil % 81.7 (*)     Lymphocyte % 7.1 (*)     Neutrophils, Absolute 8.97 (*)     Immature Grans, Absolute 0.06 (*)     All other components within normal limits   RAINBOW DRAW    Narrative:  "    The following orders were created for panel order [place] Draw.  Procedure                               Abnormality         Status                     ---------                               -----------         ------                     Green Top (Gel)[[id number]]                                  Final result               Lavender Top[[id number]]                                     Final result               Gold Top - SST[[id number]]                                   Final result               Jj Top[[id number]]                                         Final result               Light Blue Top[[id number]]                                   Final result                 Please view results for these tests on the individual orders.   GREEN TOP   LAVENDER TOP   GOLD TOP - SST   GRAY TOP   LIGHT BLUE TOP   CBC AND DIFFERENTIAL    Narrative:     The following orders were created for panel order CBC & Differential.  Procedure                               Abnormality         Status                     ---------                               -----------         ------                     CBC Auto Differential[[id number]]        Abnormal            Final result                 Please view results for these tests on the individual orders.       Meds Given in ED:   Medications   sodium chloride 0.9 % flush 10 mL (has no administration in time range)   mupirocin (BACTROBAN) 2 % ointment 1 application  (1 application  Topical Given 1/24/24 3079)

## 2024-01-24 NOTE — CASE MANAGEMENT/SOCIAL WORK
"Continued Stay Note  [facility]     Patient Name: Keyla Strong  MRN: [id number]  Today's Date: 1/24/2024    Admit Date: 1/24/2024    Plan: Shelter   Discharge Plan       Row Name 01/24/24 1441       Plan    Plan Shelter    Patient/Family in Agreement with Plan yes    Plan Comments I spoke with MANUEL Navas about Ms Strong.  Ms Strong presented to [facility] complaining of leg pain.  She was previously seen at [facility] and discharged home with antibiotic prescriptions, however she states that she is not able to  her prescriptions due to transportation and her pain has become worse and she is not able to walk at this time. I went and spoke with Ms Strong at bedside. Ms Strong confirmed that she was homeless and stated that she was currently staying at the [facility].  I called and spoke with Eriberto at the [facility], who stated that traditionally the [facility] is a male only shelter, however they do have a temporary warming shelter set up across the street. At that point I called the temporary shelter and spoke with Tova.  Tova stated that Ms Strong is not allowed back on the premises of the [facility] due to \"actions\".  I called RENA Gandhi and asked her if she knew of any women's shelters in [place].  RENA Gandhi stated that the [facility] is the only women's shelter in [place].  I also called and spoke with RENA Damian, and Nati confirmed that the [facility] is the only women's shelter in [place].  At that time I called the [facility] and was transferred to Krystal, .  Krystal stated that Ms Strong is not allowed back on the [facility] premises due to actions as well.  Furthermore, any woman that is staying at the [facility] has to be able to do their IADL's independently or they cannot stay there.  I have updated GAYLA Navas regarding difficulty in finding shelter for Ms Strong.    Final Discharge Disposition Code " 01 - home or self-care                   Discharge Codes    No documentation.                       Annemarie Grimaldo RN

## 2024-01-24 NOTE — Clinical Note
Level of Care: Observation Unit [28]   Diagnosis: Leg pain [[id number]]   Admitting Physician: BRANDON URIBE [[id number]]

## 2024-01-24 NOTE — PLAN OF CARE
Goal Outcome Evaluation:                                               Detail Level: Zone Detail Level: Detailed

## 2024-01-24 NOTE — NURSING NOTE
A&Ox4/ crying& tearful. Hypotensive. Room air.  NSR on tele. IV SL, DCI. BLL wounds, ace wrap in place, DCI. Incontinent, purwick in place, DCI. Coccyx red, blanchable, barrier cream applied. Susanna N&V. No acute changes. Falls and frequent safety checks provided.

## 2024-01-25 LAB
ALBUMIN SERPL-MCNC: 2.9 G/DL (ref 3.5–5.2)
ALBUMIN/GLOB SERPL: 1 G/DL
ALP SERPL-CCNC: 76 U/L (ref 39–117)
ALT SERPL W P-5'-P-CCNC: 9 U/L (ref 1–33)
ANION GAP SERPL CALCULATED.3IONS-SCNC: 11 MMOL/L (ref 5–15)
AST SERPL-CCNC: 15 U/L (ref 1–32)
BILIRUB SERPL-MCNC: 0.2 MG/DL (ref 0–1.2)
BUN SERPL-MCNC: 16 MG/DL (ref 6–20)
BUN/CREAT SERPL: 21.6 (ref 7–25)
CALCIUM SPEC-SCNC: 8.1 MG/DL (ref 8.6–10.5)
CHLORIDE SERPL-SCNC: 101 MMOL/L (ref 98–107)
CO2 SERPL-SCNC: 25 MMOL/L (ref 22–29)
CREAT SERPL-MCNC: 0.74 MG/DL (ref 0.57–1)
DEPRECATED RDW RBC AUTO: 43.8 FL (ref 37–54)
EGFRCR SERPLBLD CKD-EPI 2021: 93.3 ML/MIN/1.73
ERYTHROCYTE [DISTWIDTH] IN BLOOD BY AUTOMATED COUNT: 13.2 % (ref 12.3–15.4)
GLOBULIN UR ELPH-MCNC: 2.8 GM/DL
GLUCOSE SERPL-MCNC: 107 MG/DL (ref 65–99)
HCT VFR BLD AUTO: 29.4 % (ref 34–46.6)
HGB BLD-MCNC: 9.5 G/DL (ref 12–15.9)
MCH RBC QN AUTO: 29.4 PG (ref 26.6–33)
MCHC RBC AUTO-ENTMCNC: 32.3 G/DL (ref 31.5–35.7)
MCV RBC AUTO: 91 FL (ref 79–97)
PLATELET # BLD AUTO: 334 10*3/MM3 (ref 140–450)
PMV BLD AUTO: 10.5 FL (ref 6–12)
POTASSIUM SERPL-SCNC: 3.3 MMOL/L (ref 3.5–5.2)
POTASSIUM SERPL-SCNC: 4.9 MMOL/L (ref 3.5–5.2)
PROT SERPL-MCNC: 5.7 G/DL (ref 6–8.5)
RBC # BLD AUTO: 3.23 10*6/MM3 (ref 3.77–5.28)
SODIUM SERPL-SCNC: 137 MMOL/L (ref 136–145)
WBC NRBC COR # BLD AUTO: 8.38 10*3/MM3 (ref 3.4–10.8)

## 2024-01-25 PROCEDURE — 84132 ASSAY OF SERUM POTASSIUM: CPT | Performed by: HOSPITALIST

## 2024-01-25 PROCEDURE — 25010000002 ENOXAPARIN PER 10 MG: Performed by: HOSPITALIST

## 2024-01-25 PROCEDURE — 99232 SBSQ HOSP IP/OBS MODERATE 35: CPT | Performed by: HOSPITALIST

## 2024-01-25 PROCEDURE — 97161 PT EVAL LOW COMPLEX 20 MIN: CPT

## 2024-01-25 PROCEDURE — G0378 HOSPITAL OBSERVATION PER HR: HCPCS

## 2024-01-25 PROCEDURE — 85027 COMPLETE CBC AUTOMATED: CPT | Performed by: HOSPITALIST

## 2024-01-25 PROCEDURE — 96372 THER/PROPH/DIAG INJ SC/IM: CPT

## 2024-01-25 PROCEDURE — 80053 COMPREHEN METABOLIC PANEL: CPT | Performed by: HOSPITALIST

## 2024-01-25 RX ORDER — POTASSIUM CHLORIDE 20 MEQ/1
40 TABLET, EXTENDED RELEASE ORAL EVERY 4 HOURS
Status: COMPLETED | OUTPATIENT
Start: 2024-01-25 | End: 2024-01-25

## 2024-01-25 RX ORDER — NICOTINE 21 MG/24HR
1 PATCH, TRANSDERMAL 24 HOURS TRANSDERMAL
Status: DISCONTINUED | OUTPATIENT
Start: 2024-01-25 | End: 2024-01-30 | Stop reason: HOSPADM

## 2024-01-25 RX ADMIN — MUPIROCIN 1 APPLICATION: 20 OINTMENT TOPICAL at 20:19

## 2024-01-25 RX ADMIN — POTASSIUM CHLORIDE 40 MEQ: 1500 TABLET, EXTENDED RELEASE ORAL at 09:26

## 2024-01-25 RX ADMIN — ENOXAPARIN SODIUM 40 MG: 100 INJECTION SUBCUTANEOUS at 17:57

## 2024-01-25 RX ADMIN — AMOXICILLIN AND CLAVULANATE POTASSIUM 1 TABLET: 875; 125 TABLET, FILM COATED ORAL at 09:26

## 2024-01-25 RX ADMIN — SULFAMETHOXAZOLE AND TRIMETHOPRIM 1 TABLET: 800; 160 TABLET ORAL at 20:18

## 2024-01-25 RX ADMIN — SULFAMETHOXAZOLE AND TRIMETHOPRIM 1 TABLET: 800; 160 TABLET ORAL at 09:27

## 2024-01-25 RX ADMIN — Medication 10 ML: at 09:27

## 2024-01-25 RX ADMIN — AMOXICILLIN AND CLAVULANATE POTASSIUM 1 TABLET: 875; 125 TABLET, FILM COATED ORAL at 20:18

## 2024-01-25 RX ADMIN — IBUPROFEN 600 MG: 600 TABLET, FILM COATED ORAL at 06:45

## 2024-01-25 RX ADMIN — POTASSIUM CHLORIDE 40 MEQ: 1500 TABLET, EXTENDED RELEASE ORAL at 12:29

## 2024-01-25 RX ADMIN — SENNOSIDES AND DOCUSATE SODIUM 2 TABLET: 8.6; 5 TABLET ORAL at 09:26

## 2024-01-25 RX ADMIN — MUPIROCIN 1 APPLICATION: 20 OINTMENT TOPICAL at 09:29

## 2024-01-25 NOTE — PROGRESS NOTES
Continued Stay Note  [facility]     Patient Name: Keyla Strong  MRN: [id number]  Today's Date: 1/25/2024    Admit Date: 1/24/2024    Plan: shelter   Discharge Plan       Row Name 01/25/24 1626       Plan    Plan Comments Met with the patient and she was on the phone with her brother Calvin and she said she has been to the shelters [facility] are both unable to accept her back and she said that she knows she is not able to go to a shelter because they are not able to take her and she is trying to find someone to stay with instead. The [facility] has upper bunks and they are a shelter like the [facility] and she would need to care for all of her own needs.      Row Name 01/25/24 8344       Plan    Plan shelter    Patient/Family in Agreement with Plan yes    Plan Comments Met with Ms. Strong at the [facility] to discuss discharge plan. She is currently homeless and has been staying at [facility] ER per patient. She is independent with activities of daily living and has no DME. She is not current with home health or outpatient therapy. Her primary care provider is JENNA Shin. Patient reported that she does not have problems accessing medical care or obtaining medicatons.  called , Bonnie Castro, for assistance with complicated discharge. Matthew agreed to see patient later this afternoon.  will continue to follow plan of care and assist with discharge planning needs as indicated.    Final Discharge Disposition Code 01 - home or self-care                   Discharge Codes    No documentation.                 Expected Discharge Date and Time       Expected Discharge Date Expected Discharge Time    Jan 26, 2024               RENA Son

## 2024-01-25 NOTE — PROGRESS NOTES
[facility] Medicine Services  PROGRESS NOTE    Patient Name: Keyla Strong  : 1965  MRN: [id number]    Date of Admission: 2024  Primary Care Physician: Janessa Mccall APRN    Subjective   Subjective     CC:  F/U BLE pain    HPI:  Seen this morning. Has no complaints, leg pain improved. Ate well for breakfast.       Objective   Objective     Vital Signs:   Temp:  [96.5 °F (35.8 °C)-98.8 °F (37.1 °C)] 98.4 °F (36.9 °C)  Heart Rate:  [69-92] 69  Resp:  [16-18] 16  BP: ()/(38-63) 113/59     Physical Exam:  Gen-no acute distress  HENT-NCAT, mucous membranes moist, poor dentition   CV-RRR, S1 S2 normal, no m/r/g  Resp-CTAB, no wheezes or rales  Abd-soft, NT, ND, +BS  Ext-no edema  Neuro-A&Ox3, no focal deficits  Skin-BLE lesions noted, psoriatic appearing but with surrounding erythema and some drainage seen, worse on the LLE  Psych-appropriate mood      Results Reviewed:  LAB RESULTS:      Lab 24  0431 24  1130 245   WBC 8.38 10.99* 14.19*   HEMOGLOBIN 9.5* 11.7* 10.8*   HEMATOCRIT 29.4* 36.7 32.8*   PLATELETS 334 380 345   NEUTROS ABS  --  8.97* 11.77*   IMMATURE GRANS (ABS)  --  0.06* 0.05   LYMPHS ABS  --  0.78 1.01*   MONOS ABS  --  0.88 1.09*   EOS ABS  --  0.24 0.22   MCV 91.0 90.2 89         Lab 24  0431 24  1130   SODIUM 137 138   POTASSIUM 3.3* 3.7   CHLORIDE 101 100   CO2 25.0 26.0   ANION GAP 11.0 12.0   BUN 16 19   CREATININE 0.74 0.67   EGFR 93.3 100.8   GLUCOSE 107* 116*   CALCIUM 8.1* 9.0         Lab 24  0431 24  1130   TOTAL PROTEIN 5.7* 7.4   ALBUMIN 2.9* 3.7   GLOBULIN 2.8 3.7   ALT (SGPT) 9 11   AST (SGOT) 15 16   BILIRUBIN 0.2 0.2   ALK PHOS 76 94                     Brief Urine Lab Results  (Last result in the past 365 days)        Color   Clarity   Blood   Leuk Est   Nitrite   Protein   CREAT   Urine HCG        23 1336 Yellow   Clear   Negative   Negative   Negative   Negative                    Microbiology Results Abnormal       None            XR Tibia Fibula 2 View Bilateral    Result Date: 1/24/2024  XR TIBIA FIBULA 2 VW BILATERAL Date of Exam: 1/24/2024 12:14 PM EST Indication: bilateral lower leg pain Comparison: None available. Findings: No evidence of acute osseous abnormality. No visualized joint malalignment. No radiopaque foreign body. There may be generalized subcutaneous edema. Asymmetric decreased muscle bulk on the left compared to the right. Apparent soft tissue irregularity at the medial aspect of the high left ankle.     Impression: Impression: No evidence of acute osseous abnormality. Apparent soft tissue irregularity at the medial aspect of the high left ankle. Suspected generalized subcutaneous edema bilaterally. No radiopaque foreign body. Recommend correlation with exam. Electronically Signed: Brock Payne MD  1/24/2024 12:44 PM EST  Workstation ID: [id number]    XR Foot 2 View Bilateral    Result Date: 1/24/2024  XR FOOT 2 VW BILATERAL Date of Exam: 1/24/2024 12:14 PM EST Indication: bilateral foot pain Comparison: 8/14/2021 radiographs Findings: There is no evidence of fracture. Bilateral type I accessory navicular bones. Plantar and dorsal calcaneal enthesopathy with fragmentation on the right. Normal joint alignment. There is cutaneous irregularity along the medial aspect of the left lower leg, partially visualized..     Impression: Impression: No evidence of fracture or acute osseous abnormality. Cutaneous irregularity along the medial aspect of the left lower leg, which is partially visualized. Electronically Signed: Alex Whitney MD  1/24/2024 12:42 PM EST  Workstation ID: [id number]         Current medications:  Scheduled Meds:amoxicillin-clavulanate, 1 tablet, Oral, Q12H  enoxaparin, 40 mg, Subcutaneous, Daily  mupirocin, 1 application , Topical, Q12H  nicotine, 1 patch, Transdermal, Q24H  potassium chloride ER, 40 mEq, Oral, Q4H  senna-docusate sodium, 2 tablet, Oral,  BID  sodium chloride, 10 mL, Intravenous, Q12H  sulfamethoxazole-trimethoprim, 1 tablet, Oral, Q12H      Continuous Infusions:   PRN Meds:.  senna-docusate sodium **AND** polyethylene glycol **AND** bisacodyl **AND** bisacodyl    Calcium Replacement - Follow Nurse / BPA Driven Protocol    ibuprofen    Magnesium Standard Dose Replacement - Follow Nurse / BPA Driven Protocol    nitroglycerin    ondansetron ODT **OR** ondansetron    Phosphorus Replacement - Follow Nurse / BPA Driven Protocol    Potassium Replacement - Follow Nurse / BPA Driven Protocol    sodium chloride    sodium chloride    sodium chloride    Assessment & Plan   Assessment & Plan     Active Hospital Problems    Diagnosis  POA    **Leg pain [M79.606]  Yes    Psoriasis [L40.9]  Yes    Homeless [Z59.00]  Not Applicable    Anxiety associated with depression [F41.8]  Yes    Ecthyma [L08.0]  Yes      Resolved Hospital Problems   No resolved problems to display.        Brief Hospital Course to date:  Keyla Strong is a 59 y.o. female with a past medical history of schizophrenia, psoriasis, and current homelessness that presented to the ED complaining of bilateral leg pain. Had been seen at  the day prior, was prescribed ATBx and sent home, however did not fill her prescription as her pain was too bad, so she presented to [facility] ED.    This patient's problems and plans were partially entered by my partner and updated as appropriate by me 01/25/24. Copied text in this note has been reviewed and is accurate as of today's date.      Psoriasis vs Ecthyma  Bilateral Leg Pain  --Could be Staph or Strep  --WOC  --Wrap bilateral shins  --Bactrim plus Augmentin for coverage  --Bilateral tib/fib x-ray: Apparent soft tissue irregularity at the medial aspect of the high left ankle. Suspected generalized subcutaneous edema bilaterally. No radiopaque foreign body.   --PT/OT    Hypokalemia  --Replace per protocol     Homelessness  --CM in ED tried to get patient  "discharged to homeless shelter from ED but no shelter would take her due to hx of \"prior actions\"  --Patient ready for discharge on PO antibiotics as soon as safe discharge plan is arranged; social work/case management following     Anxiety with Depression  Schizophrenia   --Not on any home meds    Tobacco abuse  --Nicotine patch    Expected Discharge Location and Transportation: home  Expected Discharge medically ready anytime safe discharge plan arranged  Expected Discharge Date: 1/26/2024; Expected Discharge Time:      DVT prophylaxis:  Medical DVT prophylaxis orders are present.         AM-PAC 6 Clicks Score (PT): 19 (01/25/24 5748)    CODE STATUS:   Code Status and Medical Interventions:   Ordered at: 01/24/24 8085     Level Of Support Discussed With:    Patient     Code Status (Patient has no pulse and is not breathing):    CPR (Attempt to Resuscitate)     Medical Interventions (Patient has pulse or is breathing):    Full Support       Mitra Varma MD  01/25/24      "

## 2024-01-25 NOTE — THERAPY EVALUATION
Patient Name: Keyla Strong  : 1965    MRN: [id number]                              Today's Date: 2024       Admit Date: 2024    Visit Dx:     ICD-10-CM ICD-9-CM   1. Bilateral lower leg cellulitis  L03.116 682.6    L03.115    2. Homelessness  Z59.00 V60.0   3. Impaired mobility  Z74.09 799.89     Patient Active Problem List   Diagnosis    Well woman exam    Leg pain    Psoriasis    Homeless    Anxiety associated with depression    Ecthyma     Past Medical History:   Diagnosis Date    Anxiety     Depression     Homelessness     Migraine     Obesity     Psoriasis      Past Surgical History:   Procedure Laterality Date    NO PAST SURGERIES        General Information       Row Name 24 1030          Physical Therapy Time and Intention    Document Type evaluation  -AE     Mode of Treatment physical therapy  -AE       Row Name 24 1030          General Information    Patient Profile Reviewed yes  -AE     Prior Level of Function independent:;all household mobility;gait;transfer;ADL's;bed mobility;dressing;bathing  -AE     Existing Precautions/Restrictions fall  -AE     Barriers to Rehab medically complex  -AE       Row Name 24 1030          Living Environment    People in Home other (see comments)  Homeless, complicated d/c planning  -AE       Row Name 24 1030          Cognition    Orientation Status (Cognition) oriented x 3  -AE       Row Name 24 1030          Safety Issues, Functional Mobility    Safety Issues Affecting Function (Mobility) insight into deficits/self-awareness;safety precaution awareness;safety precautions follow-through/compliance;sequencing abilities  -AE     Impairments Affecting Function (Mobility) balance;coordination;endurance/activity tolerance;pain;strength  -AE               User Key  (r) = Recorded By, (t) = Taken By, (c) = Cosigned By      Initials Name Provider Type    AE Benson Gunn PT Physical Therapist                   Mobility        Row Name 01/25/24 1033          Bed Mobility    Bed Mobility supine-sit;sit-supine  -AE     Supine-Sit [place] (Bed Mobility) standby assist  -AE     Sit-Supine [place] (Bed Mobility) standby assist  -AE     Assistive Device (Bed Mobility) head of bed elevated  -AE     Comment, (Bed Mobility) No cues required for bed mobility.  -AE       Row Name 01/25/24 1033          Transfers    Comment, (Transfers) VCs to reduce impulsivity with STS. Pt required increased cues for upright posture and to improve sequencing with AD.  -AE       Row Name 01/25/24 1033          Sit-Stand Transfer    Sit-Stand [place] (Transfers) minimum assist (75% patient effort);2 person assist;verbal cues  -AE     Assistive Device (Sit-Stand Transfers) other (see comments)  BUE support  -AE       Row Name 01/25/24 1033          Gait/Stairs (Locomotion)    [place] Level (Gait) contact guard;minimum assist (75% patient effort);verbal cues;1 person assist  -AE     Assistive Device (Gait) walker, front-wheeled  -AE     Distance in Feet (Gait) 75  -AE     Deviations/Abnormal Patterns (Gait) bilateral deviations;gsiselle decreased;gait speed decreased;stride length decreased;base of support, narrow  -AE     Bilateral Gait Deviations forward flexed posture;heel strike decreased  -AE     Comment, (Gait/Stairs) Pt demo step through gait pattern with short step length and forward flexed posture. Pt required increased cues to improve forward gaze and widen OJSE while ambulating. Pt noted to ambulate on lateral aspect of bilat feet while ambulating. Antalgic gait pattern also noted on LLE. Further distance limited by pain.  -AE               User Key  (r) = Recorded By, (t) = Taken By, (c) = Cosigned By      Initials Name Provider Type    AE Benson Gunn PT Physical Therapist                   Obj/Interventions       Row Name 01/25/24 1041          Range of Motion Comprehensive    General Range of Motion bilateral lower extremity ROM  WFL  -AE       Row Name 01/25/24 1041          Strength Comprehensive (MMT)    General Manual Muscle Testing (MMT) Assessment lower extremity strength deficits identified  -AE     Comment, General Manual Muscle Testing (MMT) Assessment BLE grossly 4-/5  -AE       Row Name 01/25/24 1041          Balance    Balance Assessment sitting static balance;sitting dynamic balance;sit to stand dynamic balance;standing static balance;standing dynamic balance  -AE     Static Sitting Balance independent  -AE     Dynamic Sitting Balance standby assist  -AE     Position, Sitting Balance unsupported;sitting edge of bed  -AE     Sit to Stand Dynamic Balance minimal assist;2-person assist;verbal cues  -AE     Static Standing Balance contact guard;minimal assist  -AE     Dynamic Standing Balance contact guard;minimal assist  -AE     Position/Device Used, Standing Balance supported;walker, front-wheeled  -AE               User Key  (r) = Recorded By, (t) = Taken By, (c) = Cosigned By      Initials Name Provider Type    AE Benson Gunn, PT Physical Therapist                   Goals/Plan       Row Name 01/25/24 1047          Bed Mobility Goal 1 (PT)    Activity/Assistive Device (Bed Mobility Goal 1, PT) sit to supine/supine to sit  -AE     [place] Level/Cues Needed (Bed Mobility Goal 1, PT) independent  -AE     Time Frame (Bed Mobility Goal 1, PT) long term goal (LTG);10 days  -AE     Progress/Outcomes (Bed Mobility Goal 1, PT) new goal  -AE       Row Name 01/25/24 1047          Transfer Goal 1 (PT)    Activity/Assistive Device (Transfer Goal 1, PT) sit-to-stand/stand-to-sit;bed-to-chair/chair-to-bed  -AE     [place] Level/Cues Needed (Transfer Goal 1, PT) modified independence  -AE     Time Frame (Transfer Goal 1, PT) long term goal (LTG);10 days  -AE     Progress/Outcome (Transfer Goal 1, PT) new goal  -AE       Row Name 01/25/24 1047          Gait Training Goal 1 (PT)    Activity/Assistive Device (Gait Training Goal 1,  PT) gait (walking locomotion);assistive device use  -AE     [place] Level (Gait Training Goal 1, PT) modified independence  -AE     Distance (Gait Training Goal 1, PT) 350ft  -AE     Time Frame (Gait Training Goal 1, PT) long term goal (LTG);10 days  -AE     Progress/Outcome (Gait Training Goal 1, PT) new goal  -AE       Row Name 01/25/24 1047          Therapy Assessment/Plan (PT)    Planned Therapy Interventions (PT) balance training;bed mobility training;gait training;home exercise program;patient/family education;postural re-education;ROM (range of motion);strengthening;transfer training  -AE               User Key  (r) = Recorded By, (t) = Taken By, (c) = Cosigned By      Initials Name Provider Type    AE Benson Gunn, PT Physical Therapist                   Clinical Impression       Row Name 01/25/24 1042          Pain    Additional Documentation Pain Scale: FACES Pre/Post-Treatment (Group)  -AE       Row Name 01/25/24 1042          Pain Scale: FACES Pre/Post-Treatment    Pain: FACES Scale, Pretreatment 2-->hurts little bit  -AE     Posttreatment Pain Rating 4-->hurts little more  -AE     Pain Location - Side/Orientation Bilateral  -AE     Pain Location lower  -AE     Pain Location - extremity  -AE     Pre/Posttreatment Pain Comment tolerated  -AE       Row Name 01/25/24 1042          Plan of Care Review    Plan of Care Reviewed With patient  -AE     Progress no change  -AE     Outcome Evaluation Pt presents with decreased functional mobility and decreased independence with ambulation. Pt ambulated 75ft with CGA-min A and RW for support. Pt demo decreased balance throughout, especially while ambulating without AD. Recommend continued skilled IP PT interventions. Complex d/c planning ongoing, pt could benefit from rehab. Needs RW for mobility at this time.  -AE       Row Name 01/25/24 1042          Therapy Assessment/Plan (PT)    Rehab Potential (PT) fair, will monitor progress closely  -AE     Criteria  for Skilled Interventions Met (PT) yes  -AE     Therapy Frequency (PT) daily  -AE       Row Name 01/25/24 1042          Vital Signs    Pre Systolic BP Rehab 111  -AE     Pre Treatment Diastolic BP 63  -AE     Pretreatment Heart Rate (beats/min) 70  -AE     Posttreatment Heart Rate (beats/min) 74  -AE     Pre SpO2 (%) 94  -AE     O2 Delivery Pre Treatment room air  -AE     O2 Delivery Intra Treatment room air  -AE     O2 Delivery Post Treatment room air  -AE     Pre Patient Position Supine  -AE     Intra Patient Position Standing  -AE     Post Patient Position Supine  -AE       Row Name 01/25/24 1042          Positioning and Restraints    Pre-Treatment Position in bed  -AE     Post Treatment Position bed  -AE     In Bed notified nsg;fowlers;call light within reach;encouraged to call for assist;exit alarm on;side rails up x2;legs elevated  -AE               User Key  (r) = Recorded By, (t) = Taken By, (c) = Cosigned By      Initials Name Provider Type    AE Benson Gunn PT Physical Therapist                   Outcome Measures       Row Name 01/25/24 1049          How much help from another person do you currently need...    Turning from your back to your side while in flat bed without using bedrails? 4  -AE     Moving from lying on back to sitting on the side of a flat bed without bedrails? 4  -AE     Moving to and from a bed to a chair (including a wheelchair)? 3  -AE     Standing up from a chair using your arms (e.g., wheelchair, bedside chair)? 3  -AE     Climbing 3-5 steps with a railing? 2  -AE     To walk in hospital room? 3  -AE     AM-PAC 6 Clicks Score (PT) 19  -AE     Highest Level of Mobility Goal 6 --> Walk 10 steps or more  -AE       Row Name 01/25/24 1049          Functional Assessment    Outcome Measure Options AM-PAC 6 Clicks Basic Mobility (PT)  -AE               User Key  (r) = Recorded By, (t) = Taken By, (c) = Cosigned By      Initials Name Provider Type    AE Benson Gunn, DAVE Physical  Therapist                                 Physical Therapy Education       Title: PT OT SLP Therapies (In Progress)       Topic: Physical Therapy (In Progress)       Point: Mobility training (In Progress)       Learning Progress Summary             Patient Acceptance, E, NR by AE at 1/25/2024 0926                         Point: Home exercise program (Not Started)       Learner Progress:  Not documented in this visit.              Point: Body mechanics (In Progress)       Learning Progress Summary             Patient Acceptance, E, NR by AE at 1/25/2024 0926                         Point: Precautions (In Progress)       Learning Progress Summary             Patient Acceptance, E, NR by AE at 1/25/2024 0926                                         User Key       Initials Effective Dates Name Provider Type Discipline    AE 09/21/21 -  Benson Gunn, PT Physical Therapist PT                  PT Recommendation and Plan  Planned Therapy Interventions (PT): balance training, bed mobility training, gait training, home exercise program, patient/family education, postural re-education, ROM (range of motion), strengthening, transfer training  Plan of Care Reviewed With: patient  Progress: no change  Outcome Evaluation: Pt presents with decreased functional mobility and decreased independence with ambulation. Pt ambulated 75ft with CGA-min A and RW for support. Pt demo decreased balance throughout, especially while ambulating without AD. Recommend continued skilled IP PT interventions. Complex d/c planning ongoing, pt could benefit from rehab. Needs RW for mobility at this time.     Time Calculation:   PT Evaluation Complexity  History, PT Evaluation Complexity: 3 or more personal factors and/or comorbidities  Examination of Body Systems (PT Eval Complexity): 1-2 elements  Clinical Presentation (PT Evaluation Complexity): stable  Clinical Decision Making (PT Evaluation Complexity): low complexity  Overall Complexity (PT  Evaluation Complexity): low complexity     PT Charges       Row Name 01/25/24 1050             Time Calculation    Start Time 0926  -AE      PT Received On 01/25/24  -AE      PT Goal Re-Cert Due Date 02/04/24  -AE         Untimed Charges    PT Eval/Re-eval Minutes 46  -AE         Total Minutes    Untimed Charges Total Minutes 46  -AE       Total Minutes 46  -AE                User Key  (r) = Recorded By, (t) = Taken By, (c) = Cosigned By      Initials Name Provider Type    AE Benson Gunn, PT Physical Therapist                  Therapy Charges for Today       Code Description Service Date Service Provider Modifiers Qty    08441389724 HC PT EVAL LOW COMPLEXITY 4 1/25/2024 Benson Gunn, PT GP 1    72643833458 HC PT THER SUPP EA 15 MIN 1/25/2024 Benson Gunn, PT GP 2            PT G-Codes  Outcome Measure Options: AM-PAC 6 Clicks Basic Mobility (PT)  AM-PAC 6 Clicks Score (PT): 19  PT Discharge Summary  Anticipated Discharge Disposition (PT): inpatient rehabilitation facility, other (see comments) (complex d/c planning)    Benson Gunn PT  1/25/2024

## 2024-01-25 NOTE — PLAN OF CARE
Goal Outcome Evaluation:  Plan of Care Reviewed With: patient        Progress: no change  Outcome Evaluation: Pt presents with decreased functional mobility and decreased independence with ambulation. Pt ambulated 75ft with CGA-min A and RW for support. Pt demo decreased balance throughout, especially while ambulating without AD. Recommend continued skilled IP PT interventions. Complex d/c planning ongoing, pt could benefit from rehab. Needs RW for mobility at this time.      Anticipated Discharge Disposition (PT): inpatient rehabilitation facility, other (see comments) (complex d/c planning)

## 2024-01-25 NOTE — CASE MANAGEMENT/SOCIAL WORK
Discharge Planning Assessment  [facility]     Patient Name: Keyla Strong  MRN: [id number]  Today's Date: 1/25/2024    Admit Date: 1/24/2024    Plan: shelter   Discharge Needs Assessment       Row Name 01/25/24 1450       Living Environment    People in Home other relative(s)  homeless    Current Living Arrangements homeless    In the past 12 months has the electric, gas, oil, or water company threatened to shut off services in your home? --  homeless    Primary Care Provided by self    Provides Primary Care For no one, unable/limited ability to care for self    Family Caregiver if Needed none    Quality of Family Relationships unable to assess    Able to Return to Prior Arrangements no       Resource/Environmental Concerns    Resource/Environmental Concerns other (see comments)  homeless    Transportation Concerns rides, unreliable from others       Transportation Needs    In the past 12 months, has lack of transportation kept you from medical appointments or from getting medications? Pt Declined    In the past 12 months, has lack of transportation kept you from meetings, work, or from getting things needed for daily living? Pt Declined       Food Insecurity    Within the past 12 months, you worried that your food would run out before you got the money to buy more. Often true    Within the past 12 months, the food you bought just didn't last and you didn't have money to get more. Often true       Transition Planning    Patient/Family Anticipates Transition to shelter    Transportation Anticipated health plan transportation       Discharge Needs Assessment    Equipment Currently Used at Home none    Concerns to be Addressed discharge planning    Anticipated Changes Related to Illness none    Equipment Needed After Discharge none    Current Discharge Risk substance use/abuse;homeless                   Discharge Plan       Row Name 01/25/24 1454       Plan    Plan shelter    Patient/Family in Agreement with Plan  yes    Plan Comments Met with Ms. Gates at the [facility] to discuss discharge plan. She is currently homeless and has been staying at [facility] per patient. She is independent with activities of daily living and has no DME. She is not current with home health or outpatient therapy. Her primary care provider is JENNA Shin. Patient reported that she does not have problems accessing medical care or obtaining medicatons.  called , Bonnie Castro, for assistance with complicated discharge. Matthew agreed to see patient later this afternoon.  will continue to follow plan of care and assist with discharge planning needs as indicated.    Final Discharge Disposition Code 01 - home or self-care                  Continued Care and Services - Admitted Since 1/24/2024    Coordination has not been started for this encounter.       Expected Discharge Date and Time       Expected Discharge Date Expected Discharge Time    Jan 26, 2024            Demographic Summary       Row Name 01/25/24 1448       General Information    Admission Type observation    Arrived From home    Referral Source admission list    Reason for Consult discharge planning    Preferred Language English       Contact Information    Contact Information Comments ERIC GATES Daughter [phone]         YosefLaverne Daughter   [phone]                   Functional Status       Row Name 01/25/24 1449       Functional Status    Usual Activity Tolerance moderate    Current Activity Tolerance other (see comments)  christine       Physical Activity    On average, how many days per week do you engage in moderate to strenuous exercise (like a brisk walk)? 0 days    On average, how many minutes do you engage in exercise at this level? 0 min    Number of minutes of exercise per week 0       Assessment of Health Literacy    How often do you have someone help you read hospital materials? Sometimes    How often do you have  problems learning about your medical condition because of difficulty understanding written information? Sometimes    How often do you have a problem understanding what is told to you about your medical condition? Sometimes    How confident are you filling out medical forms by yourself? Somewhat    Health Literacy Moderate       Functional Status, IADL    Medications independent    Meal Preparation independent    Housekeeping independent    Laundry independent    Shopping independent       Mental Status    General Appearance WDL X  dissheveled       Mental Status Summary    Recent Changes in Mental Status/Cognitive Functioning unable to assess                   Psychosocial    No documentation.                  Abuse/Neglect    No documentation.                  Legal    No documentation.                  Substance Abuse    No documentation.                  Patient Forms    No documentation.                     Lupe Gomez RN

## 2024-01-25 NOTE — PLAN OF CARE
Problem: Adult Inpatient Plan of Care  Goal: Plan of Care Review  Outcome: Ongoing, Progressing  Goal: Patient-Specific Goal (Individualized)  Outcome: Ongoing, Progressing  Goal: Absence of Hospital-Acquired Illness or Injury  Outcome: Ongoing, Progressing  Intervention: Identify and Manage Fall Risk  Description: Perform standard risk assessment on admission using a validated tool or comprehensive approach appropriate to the patient; reassess fall risk frequently, with change in status or transfer to another level of care.  Communicate fall injury risk to interprofessional healthcare team.  Determine need for increased observation, equipment and environmental modification, such as low bed, signage and supportive, nonskid footwear.  Adjust safety measures to individual developmental age, stage and identified risk factors.  Reinforce the importance of safety and physical activity with patient and family.  Perform regular intentional rounding to assess need for position change, pain assessment and personal needs, including assistance with toileting.  Recent Flowsheet Documentation  Taken 1/25/2024 0400 by Nuris Esparza, RN  Safety Promotion/Fall Prevention:   activity supervised   assistive device/personal items within reach   clutter free environment maintained   fall prevention program maintained   lighting adjusted   mobility aid in reach   nonskid shoes/slippers when out of bed   room organization consistent   safety round/check completed  Taken 1/25/2024 0200 by Nuris Esparza, RN  Safety Promotion/Fall Prevention:   activity supervised   assistive device/personal items within reach   clutter free environment maintained   fall prevention program maintained   lighting adjusted   mobility aid in reach   nonskid shoes/slippers when out of bed   room organization consistent   safety round/check completed  Taken 1/25/2024 0000 by Nuris Esparza, RN  Safety Promotion/Fall Prevention:   activity supervised    assistive device/personal items within reach   clutter free environment maintained   fall prevention program maintained   lighting adjusted   mobility aid in reach   nonskid shoes/slippers when out of bed   room organization consistent   safety round/check completed  Taken 1/24/2024 2200 by Nuris Esparza RN  Safety Promotion/Fall Prevention:   activity supervised   assistive device/personal items within reach   clutter free environment maintained   fall prevention program maintained   lighting adjusted   mobility aid in reach   nonskid shoes/slippers when out of bed   room organization consistent   safety round/check completed  Taken 1/24/2024 2000 by Nuris Esparza RN  Safety Promotion/Fall Prevention:   activity supervised   assistive device/personal items within reach   clutter free environment maintained   fall prevention program maintained   lighting adjusted   mobility aid in reach   nonskid shoes/slippers when out of bed   room organization consistent   safety round/check completed  Intervention: Prevent Skin Injury  Description: Perform a screening for skin injury risk, such as pressure or moisture associated skin damage on admission and at regular intervals throughout hospital stay.  Keep all areas of skin (especially folds) clean and dry.  Maintain adequate skin hydration.  Relieve and redistribute pressure and protect bony prominences; implement measures based on patient-specific risk factors.  Match turning and repositioning schedule to clinical condition.  Encourage weight shift frequently; assist with reposition if unable to complete independently.  Float heels off bed; avoid pressure on the Achilles tendon.  Keep skin free from extended contact with medical devices.  Encourage functional activity and mobility, as early as tolerated.  Use aids (e.g., slide boards, mechanical lift) during transfer.  Recent Flowsheet Documentation  Taken 1/25/2024 0400 by Nuris Esparza, RN  Body Position: position  changed independently  Skin Protection:   adhesive use limited   tubing/devices free from skin contact   transparent dressing maintained   skin-to-skin areas padded   skin-to-device areas padded   incontinence pads utilized   skin sealant/moisture barrier applied  Taken 1/25/2024 0200 by Nuris Esparza RN  Body Position: position changed independently  Skin Protection:   adhesive use limited   tubing/devices free from skin contact   transparent dressing maintained   skin-to-skin areas padded   skin-to-device areas padded   incontinence pads utilized   skin sealant/moisture barrier applied  Taken 1/25/2024 0000 by Nuris Esparza RN  Body Position: position changed independently  Skin Protection:   adhesive use limited   tubing/devices free from skin contact   transparent dressing maintained   skin-to-skin areas padded   skin-to-device areas padded   skin sealant/moisture barrier applied   incontinence pads utilized  Taken 1/24/2024 2200 by Nuris Esparza RN  Body Position: position changed independently  Skin Protection:   adhesive use limited   tubing/devices free from skin contact   transparent dressing maintained   skin-to-skin areas padded   skin-to-device areas padded   incontinence pads utilized   skin sealant/moisture barrier applied  Taken 1/24/2024 2000 by Nuris Esparza RN  Body Position: position changed independently  Skin Protection:   adhesive use limited   tubing/devices free from skin contact   transparent dressing maintained   skin-to-skin areas padded   skin-to-device areas padded   incontinence pads utilized   skin sealant/moisture barrier applied  Intervention: Prevent and Manage VTE (Venous Thromboembolism) Risk  Description: Assess for VTE (venous thromboembolism) risk.  Encourage and assist with early ambulation.  Initiate and maintain compression or other therapy, as indicated, based on identified risk in accordance with organizational protocol and provider order.  Encourage both active  and passive leg exercises while in bed, if unable to ambulate.  Recent Flowsheet Documentation  Taken 1/25/2024 0400 by Nuris Esparza RN  Activity Management: activity encouraged  Taken 1/25/2024 0200 by Nuris Esparza RN  Activity Management: activity encouraged  Taken 1/25/2024 0000 by Nuris Esparza RN  Activity Management: activity encouraged  Taken 1/24/2024 2200 by Nuris Esparza RN  Activity Management: activity encouraged  Taken 1/24/2024 2000 by Nuris Esparza RN  Activity Management: activity encouraged  Intervention: Prevent Infection  Description: Maintain skin and mucous membrane integrity; promote hand, oral and pulmonary hygiene.  Optimize fluid balance, nutrition, sleep and glycemic control to maximize infection resistance.  Identify potential sources of infection early to prevent or mitigate progression of infection (e.g., wound, lines, devices).  Evaluate ongoing need for invasive devices; remove promptly when no longer indicated.  Recent Flowsheet Documentation  Taken 1/24/2024 2000 by Nuris Esparza RN  Infection Prevention:   environmental surveillance performed   equipment surfaces disinfected   hand hygiene promoted   personal protective equipment utilized   rest/sleep promoted  Goal: Optimal Comfort and Wellbeing  Outcome: Ongoing, Progressing  Intervention: Provide Person-Centered Care  Description: Use a family-focused approach to care.  Develop trust and rapport by proactively providing information, encouraging questions, addressing concerns and offering reassurance.  Acknowledge emotional response to hospitalization.  Recognize and utilize personal coping strategies.  Honor spiritual and cultural preferences.  Recent Flowsheet Documentation  Taken 1/25/2024 0000 by Nuris Esparza RN  Trust Relationship/Rapport:   care explained   choices provided   emotional support provided   empathic listening provided   questions answered   questions encouraged   thoughts/feelings  acknowledged  Taken 1/24/2024 2000 by Nuris Esparza RN  Trust Relationship/Rapport:   care explained   choices provided   emotional support provided   empathic listening provided   questions answered   questions encouraged   thoughts/feelings acknowledged  Goal: Readiness for Transition of Care  Outcome: Ongoing, Progressing     Problem: Pain Acute  Goal: Acceptable Pain Control and Functional Ability  Outcome: Ongoing, Progressing  Intervention: Prevent or Manage Pain  Description: Evaluate pain level, effect of treatment and patient response at regular intervals.  Minimize painful stimuli; coordinate care and adjust environment (e.g., light, noise, unnecessary movement); promote sleep/rest.  Match pharmacologic analgesia to severity and type of pain mechanism (e.g., neuropathic, muscle, inflammatory); consider multimodal approach (e.g., nonopioid, opioid, adjuvant).  Provide medication at regular intervals; titrate to patient response; premedicate for painful procedures.  Manage breakthrough pain with additional doses; consider rotation or switching medication.  Monitor for signs of substance tolerance (increased dose to reach desired effect, decreased effect with same dose).  Manage medication-induced effects, such as constipation, nausea, pruritus, urinary retention, somnolence and dizziness.  Provide multimodal interventions, such as as physical activity, therapeutic exercise, yoga, TENS (transcutaneous electrical nerve stimulation) and manual therapy.  Train in functional activity modifications, such as body mechanics, posture, ergonomics, energy conservation and activity pacing.  Consider addition of complementary or alternative therapy, such as acupuncture, hypnosis or therapeutic touch.  Recent Flowsheet Documentation  Taken 1/25/2024 0400 by Nuris Esparza RN  Medication Review/Management: medications reviewed  Taken 1/25/2024 0200 by Nuris Esparza RN  Medication Review/Management: medications  reviewed  Taken 1/25/2024 0000 by Nuris Esparza RN  Medication Review/Management: medications reviewed  Taken 1/24/2024 2200 by Nuris Esparza RN  Medication Review/Management: medications reviewed  Taken 1/24/2024 2000 by Nuris Esparza RN  Medication Review/Management: medications reviewed  Intervention: Optimize Psychosocial Wellbeing  Description: Facilitate patient’s self-control over pain by providing pain information and allowing choices in treatment.  Consider and address emotional response to pain.  Explore and promote use of coping strategies; address barriers to successful coping.  Evaluate and assist with psychosocial, cultural and spiritual factors impacting pain.  Modify pain perception using techniques, such as distraction, mindfulness, guided imagery, meditation or music.  Assess for risk factors for developing chronic pain, such as depression, fear, pain avoidance and pain catastrophizing.  Consider referral for ongoing coping support, such as education, relaxation training and role of thoughts.  Recent Flowsheet Documentation  Taken 1/24/2024 2000 by Nuris Esparza RN  Diversional Activities: television     Problem: Fall Injury Risk  Goal: Absence of Fall and Fall-Related Injury  Outcome: Ongoing, Progressing  Intervention: Identify and Manage Contributors  Description: Develop a fall prevention plan with the patient and caregiver/family.  Provide reorientation, appropriate sensory stimulation and routines with changes in mental status to decrease risk of fall.  Promote use of personal vision and auditory aids.  Assess assistance level required for safe and effective self-care; provide support as needed, such as toileting, mobilization. For age 65 and older, implement timed toileting with assistance.  Encourage physical activity, such as performance of mobility and self-care at highest level of patient ability, multicomponent exercise program and provision of appropriate assistive devices.  If  fall occurs, assess the severity of injury; implement fall injury protocol. Determine the cause and revise fall injury prevention plan.  Regularly review medication contribution to fall risk; adjust medication administration times to minimize risk of falling.  Consider risk related to polypharmacy and age.  Balance adequate pain management with potential for oversedation.  Recent Flowsheet Documentation  Taken 1/25/2024 0400 by Nuris Esparza RN  Medication Review/Management: medications reviewed  Taken 1/25/2024 0200 by Nuris Esparza RN  Medication Review/Management: medications reviewed  Taken 1/25/2024 0000 by Nuris Esparza RN  Medication Review/Management: medications reviewed  Taken 1/24/2024 2200 by Nuris Esparza RN  Medication Review/Management: medications reviewed  Taken 1/24/2024 2000 by Nuris Esparza RN  Medication Review/Management: medications reviewed  Intervention: Promote Injury-Free Environment  Description: Provide a safe, barrier-free environment that encourages independent activity.  Keep care area uncluttered and well-lighted.  Determine need for increased observation or monitoring.  Avoid use of devices that minimize mobility, such as restraints or indwelling urinary catheter.  Recent Flowsheet Documentation  Taken 1/25/2024 0400 by Nuris Esparza RN  Safety Promotion/Fall Prevention:   activity supervised   assistive device/personal items within reach   clutter free environment maintained   fall prevention program maintained   lighting adjusted   mobility aid in reach   nonskid shoes/slippers when out of bed   room organization consistent   safety round/check completed  Taken 1/25/2024 0200 by Nuris Esparza RN  Safety Promotion/Fall Prevention:   activity supervised   assistive device/personal items within reach   clutter free environment maintained   fall prevention program maintained   lighting adjusted   mobility aid in reach   nonskid shoes/slippers when out of bed   room  organization consistent   safety round/check completed  Taken 1/25/2024 0000 by Nuris Esparza RN  Safety Promotion/Fall Prevention:   activity supervised   assistive device/personal items within reach   clutter free environment maintained   fall prevention program maintained   lighting adjusted   mobility aid in reach   nonskid shoes/slippers when out of bed   room organization consistent   safety round/check completed  Taken 1/24/2024 2200 by Nuris Esparza RN  Safety Promotion/Fall Prevention:   activity supervised   assistive device/personal items within reach   clutter free environment maintained   fall prevention program maintained   lighting adjusted   mobility aid in reach   nonskid shoes/slippers when out of bed   room organization consistent   safety round/check completed  Taken 1/24/2024 2000 by Nuris Esparza RN  Safety Promotion/Fall Prevention:   activity supervised   assistive device/personal items within reach   clutter free environment maintained   fall prevention program maintained   lighting adjusted   mobility aid in reach   nonskid shoes/slippers when out of bed   room organization consistent   safety round/check completed     Problem: Infection  Goal: Absence of Infection Signs and Symptoms  Outcome: Ongoing, Progressing  Intervention: Prevent or Manage Infection  Description: Implement transmission-based precautions and isolation, as indicated, to prevent spread of infection.  Obtain cultures prior to initiating antimicrobial therapy, when possible. Do not delay treatment for laboratory results in the presence of high suspicion or clinical indicators.  Administer ordered antimicrobial therapy promptly; reassess need regularly.  Monitor laboratory value, diagnostic test and clinical status trends for signs of infection progression.  Identify early signs of sepsis, such as increased heart rate and decreased blood pressure, as well as changes in mental state, respiratory pattern or peripheral  perfusion.  Prepare for rapid sepsis management, including lactate level, intravenous access, fluid administration and oxygen therapy.  Provide fever-reduction and comfort measures.  Recent Flowsheet Documentation  Taken 1/24/2024 2000 by Nuris Esparza RN  Isolation Precautions: precautions maintained     Problem: Skin Injury Risk Increased  Goal: Skin Health and Integrity  Outcome: Ongoing, Progressing  Intervention: Optimize Skin Protection  Description: Perform a full pressure injury risk assessment, as indicated by screening, upon admission to care unit.  Reassess skin (injury risk, full inspection) frequently (e.g., scheduled interval, with change in condition) to provide optimal early detection and prevention.  Maintain adequate tissue perfusion (e.g., encourage fluid balance; avoid crossing legs, constrictive clothing or devices) to promote tissue oxygenation.  Maintain head of bed at lowest degree of elevation tolerated, considering medical condition and other restrictions.  Avoid positioning onto an area that remains reddened.  Minimize incontinence and moisture (e.g., toileting schedule; moisture-wicking pad, diaper or incontinence collection device; skin moisture barrier).  Cleanse skin promptly and gently when soiled utilizing a pH-balanced cleanser.  Relieve and redistribute pressure (e.g., scheduled position changes, weight shifts, use of support surface, medical device repositioning, protective dressing application, use of positioning device, microclimate control, use of pressure-injury-monitor  Encourage increased activity, such as sitting in a chair at the bedside or early mobilization, when able to tolerate.  Recent Flowsheet Documentation  Taken 1/25/2024 0400 by Nuris Esparza, RN  Pressure Reduction Techniques:   frequent weight shift encouraged   weight shift assistance provided   heels elevated off bed   positioned off wounds   pressure points protected  Head of Bed (HOB) Positioning: HOB at  30-45 degrees  Pressure Reduction Devices:   pressure-redistributing mattress utilized   positioning supports utilized   heel offloading device utilized  Skin Protection:   adhesive use limited   tubing/devices free from skin contact   transparent dressing maintained   skin-to-skin areas padded   skin-to-device areas padded   incontinence pads utilized   skin sealant/moisture barrier applied  Taken 1/25/2024 0200 by Nuris Esparza RN  Pressure Reduction Techniques:   frequent weight shift encouraged   weight shift assistance provided   heels elevated off bed   positioned off wounds   pressure points protected  Pressure Reduction Devices:   pressure-redistributing mattress utilized   positioning supports utilized   heel offloading device utilized  Skin Protection:   adhesive use limited   tubing/devices free from skin contact   transparent dressing maintained   skin-to-skin areas padded   skin-to-device areas padded   incontinence pads utilized   skin sealant/moisture barrier applied  Taken 1/25/2024 0000 by Nuris Esparza RN  Pressure Reduction Techniques:   frequent weight shift encouraged   weight shift assistance provided   heels elevated off bed   positioned off wounds   pressure points protected  Pressure Reduction Devices:   heel offloading device utilized   positioning supports utilized   pressure-redistributing mattress utilized  Skin Protection:   adhesive use limited   tubing/devices free from skin contact   transparent dressing maintained   skin-to-skin areas padded   skin-to-device areas padded   skin sealant/moisture barrier applied   incontinence pads utilized  Taken 1/24/2024 2200 by Nuris Esparza RN  Pressure Reduction Techniques:   frequent weight shift encouraged   weight shift assistance provided   heels elevated off bed   positioned off wounds   pressure points protected  Head of Bed (HOB) Positioning: HOB at 30-45 degrees  Pressure Reduction Devices:   pressure-redistributing mattress  utilized   positioning supports utilized   heel offloading device utilized  Skin Protection:   adhesive use limited   tubing/devices free from skin contact   transparent dressing maintained   skin-to-skin areas padded   skin-to-device areas padded   incontinence pads utilized   skin sealant/moisture barrier applied  Taken 1/24/2024 2000 by Nuris Esparza RN  Pressure Reduction Techniques:   frequent weight shift encouraged   weight shift assistance provided   heels elevated off bed   positioned off wounds   pressure points protected  Head of Bed (HOB) Positioning: HOB at 30-45 degrees  Pressure Reduction Devices:   pressure-redistributing mattress utilized   positioning supports utilized   heel offloading device utilized  Skin Protection:   adhesive use limited   tubing/devices free from skin contact   transparent dressing maintained   skin-to-skin areas padded   skin-to-device areas padded   incontinence pads utilized   skin sealant/moisture barrier applied   Goal Outcome Evaluation:

## 2024-01-26 ENCOUNTER — TELEPHONE (OUTPATIENT)
Age: 59
End: 2024-01-26
Payer: MEDICAID

## 2024-01-26 PROCEDURE — 93010 ELECTROCARDIOGRAM REPORT: CPT | Performed by: INTERNAL MEDICINE

## 2024-01-26 PROCEDURE — 99232 SBSQ HOSP IP/OBS MODERATE 35: CPT | Performed by: INTERNAL MEDICINE

## 2024-01-26 PROCEDURE — 96372 THER/PROPH/DIAG INJ SC/IM: CPT

## 2024-01-26 PROCEDURE — 25010000002 ZIPRASIDONE MESYLATE PER 10 MG: Performed by: INTERNAL MEDICINE

## 2024-01-26 PROCEDURE — 93005 ELECTROCARDIOGRAM TRACING: CPT | Performed by: INTERNAL MEDICINE

## 2024-01-26 PROCEDURE — G0378 HOSPITAL OBSERVATION PER HR: HCPCS

## 2024-01-26 RX ORDER — HYDROXYZINE HYDROCHLORIDE 25 MG/1
25 TABLET, FILM COATED ORAL 3 TIMES DAILY PRN
Status: DISCONTINUED | OUTPATIENT
Start: 2024-01-26 | End: 2024-01-30 | Stop reason: HOSPADM

## 2024-01-26 RX ORDER — ZIPRASIDONE MESYLATE 20 MG/ML
20 INJECTION, POWDER, LYOPHILIZED, FOR SOLUTION INTRAMUSCULAR EVERY 4 HOURS PRN
Status: DISCONTINUED | OUTPATIENT
Start: 2024-01-26 | End: 2024-01-30 | Stop reason: HOSPADM

## 2024-01-26 RX ADMIN — AMOXICILLIN AND CLAVULANATE POTASSIUM 1 TABLET: 875; 125 TABLET, FILM COATED ORAL at 08:48

## 2024-01-26 RX ADMIN — SENNOSIDES AND DOCUSATE SODIUM 2 TABLET: 8.6; 5 TABLET ORAL at 08:48

## 2024-01-26 RX ADMIN — SULFAMETHOXAZOLE AND TRIMETHOPRIM 1 TABLET: 800; 160 TABLET ORAL at 20:37

## 2024-01-26 RX ADMIN — MUPIROCIN 1 APPLICATION: 20 OINTMENT TOPICAL at 08:49

## 2024-01-26 RX ADMIN — ZIPRASIDONE MESYLATE 20 MG: 20 INJECTION, POWDER, LYOPHILIZED, FOR SOLUTION INTRAMUSCULAR at 11:43

## 2024-01-26 RX ADMIN — AMOXICILLIN AND CLAVULANATE POTASSIUM 1 TABLET: 875; 125 TABLET, FILM COATED ORAL at 20:37

## 2024-01-26 RX ADMIN — SULFAMETHOXAZOLE AND TRIMETHOPRIM 1 TABLET: 800; 160 TABLET ORAL at 08:48

## 2024-01-26 RX ADMIN — WHITE PETROLATUM 1 APPLICATION: 1.75 OINTMENT TOPICAL at 20:38

## 2024-01-26 RX ADMIN — MUPIROCIN 1 APPLICATION: 20 OINTMENT TOPICAL at 20:38

## 2024-01-26 NOTE — NURSING NOTE
Patient was hallucinating and was agitated this morning.She was constantly pulling her tele leads, O2 probe, turning her bed alarm off and refusing care. Patient stated she was stressed out and wanted to get some fresh air and cigarettes. Called white code on patient at 1100. She was trying to leave the building. Security brought back patient to the bedside. She got Geodon IM (deltoid). Denies numbness, tingling or pain at IM injection site.

## 2024-01-26 NOTE — PROGRESS NOTES
"                  Clinical Nutrition   Nutrition Support Assessment  Reason for Visit: MST score 2+, \"Unsure\" unintentional weight loss      Patient Name: Keyla Strong  YOB: 1965  MRN: [id number]  Date of Encounter: 01/26/24 15:03 EST  Admission date: 1/24/2024    Comments:    Nutrition Assessment   Admission Diagnosis:  Leg pain [M79.606]      Problem List:    Leg pain    Psoriasis    Homeless    Anxiety associated with depression    Ecthyma        PMH:   She  has a past medical history of Anxiety, Depression, Homelessness, Migraine, Obesity, and Psoriasis.    PSH:  She  has a past surgical history that includes No past surgeries.    Applicable Nutrition Concerns:   Skin:  Oral:  GI:    Applicable Interval History:         Reported/Observed/Food/Nutrition Related History:     RD spoke w/RN, not appropriate for nutrition interview at this time. RN reports code white called earlier. RN does not report any pertinent nutrition concerns at this time. Pt eating 100% x 3 meals so far. Wt appears stable per EMR. NKFA per [facility]. RD to visit as able.     Anthropometrics     Flowsheet Rows      Flowsheet Row First Filed Value   Admission Height 157.5 cm (62\") Documented at 01/24/2024 1121   Admission Weight 59 kg (130 lb) Documented at 01/24/2024 1121              Height: Height: 157.5 cm (62\")  Last Filed Weight: Weight: 59.6 kg (131 lb 6.4 oz) (01/24/24 1610)  Method: Weight Method: Bed scale (bed weas sit to zero before the PT got to the room. Pt was not able to stand at this time due to leg pain)  BMI: BMI (Calculated): 24  BMI classification: Normal: 18.5-24.9kg/m2  IBW:  50kg    UBW:  130lbs per EMR  Weight change:      Weight       Weight (kg) Weight (lbs) Weight Method Visit Report   1/12/2023 63.504 kg  140 lb  Standing scale     12/13/2023 58.968 kg  130 lb      12/16/2023 58.968 kg  130 lb      12/18/2023 58.968 kg  130 lb  Stated     12/28/2023 58.968 kg  130 lb  Estimated     1/24/2024 59.603 kg  " 131 lb 6.4 oz  Bed scale      58.968 kg  130 lb  Stated         Nutrition Focused Physical Exam     Date:    1/26     Unable to perform exam due to: Pt unable to participate at time of visit    Current Nutrition Prescription   PO: Diet: Regular/House Diet; Texture: Regular Texture (IDDSI 7); Fluid Consistency: Thin (IDDSI 0)  Oral Nutrition Supplement:   Intake: 100% x 3 meals documented    Nutrition Diagnosis   Date:  1/26            Updated:    Problem No nutrition diagnosis at this time   Etiology    Signs/Symptoms    Status:     Goal:   General: Maintain nutrition  PO: Maintain intake  EN/PN: N/A    Nutrition Intervention      Follow treatment progress, Care plan reviewed      Monitoring/Evaluation:   Per protocol, PO intake      Carolyn Zhang, MS,RD,LD  Time Spent: 15

## 2024-01-26 NOTE — NURSING NOTE
Woc consulted for bilateral lower extremities mainly the medial left ankle and the posterior right Achilles.    Wounds are much better than the pictures that were taken they are no egg exudate no drainage no pus no redness no swelling they are tender.  They are pink dermis.    Woc cleansed with normal saline and applied multilayer Xeroform and covered with Mepilex Mepilex.  You may changes every 3 days.    Patient was educated on how to change and supplies some supplies were left in the room.  Patient will need Mepilex's and Xeroform to go home with.    Patient also has psoriasis on upper legs she can use Aquaphor to soften these up.  The steroid prescription that she did not get to  will also help with inflammation and itching.  The Aquaphor will help keep it moist and dry because it to dry will it as well.  Will order aqua for now.    Woc to sign off.  If new skin integrity issues arise reconsult Woc

## 2024-01-26 NOTE — CASE MANAGEMENT/SOCIAL WORK
Continued Stay Note  [facility]     Patient Name: Keyla Strong  MRN: [id number]  Today's Date: 1/26/2024    Admit Date: 1/24/2024    Plan: TBD   Discharge Plan       Row Name 01/26/24 1350       Plan    Plan TBD    Plan Comments Met with Ms. Strong at the bedside this morning to discuss discharge plan. She cannot go back to [facility] or the [facility]. She stated she is still trying to get in touch with her brother regarding a place to go at discharge.  will continue to follow plan of care and assist with discharge planning needs as indicated.    Final Discharge Disposition Code 30 - still a patient                   Discharge Codes    No documentation.                 Expected Discharge Date and Time       Expected Discharge Date Expected Discharge Time    Jan 27, 2024               Lupe Gomez RN

## 2024-01-26 NOTE — NURSING NOTE
During bedside report it was found that the patient had removed her IV and placed it on the bedside table. When questioned as to why the IV was removed the patient stated it was hurting and she was told there would not be a use for it. This nurse educated the patient to the importance of having IV access while in the hospital. The patient stated she did not want another IV to be placed at this time. I informed the patient that one would need to be put in if medication orders changed. Patient agreed to future placement but not at this time.

## 2024-01-26 NOTE — PROGRESS NOTES
Continued Stay Note  [facility]     Patient Name: Keyla Strong  MRN: [id number]  Today's Date: 1/26/2024    Admit Date: 1/24/2024    Plan: TBD   Discharge Plan       Row Name 01/26/24 1417       Plan    Plan Comments There is a tele psych assessment scheduled on Tuesday at 4:00 pm.      Row Name 01/26/24 1350       Plan    Plan TBD    Plan Comments Met with Ms. Strong at the bedside this morning to discuss discharge plan. She cannot go back to [facility] or the [facility]. She stated she is still trying to get in touch with her brother regarding a place to go at discharge.  will continue to follow plan of care and assist with discharge planning needs as indicated.    Final Discharge Disposition Code 30 - still a patient                   Discharge Codes    No documentation.                 Expected Discharge Date and Time       Expected Discharge Date Expected Discharge Time    Jan 27, 2024               RENA Son

## 2024-01-26 NOTE — TELEPHONE ENCOUNTER
An in-patient telehealth consult has been scheduled [facility]    Patient: Keyla Strong    : 1965    MRN: [id number]    Provider Completing: JENNA Felipe    Provider Location: [facility]on    Appt Date: 24    Appt Time: 4:00    Complaint/Reason: Medication Consultation - [facility]     Requesting: Bonnie Castro     Contact Number: [phone]

## 2024-01-26 NOTE — PROGRESS NOTES
"    [facility] Medicine Services  PROGRESS NOTE    Patient Name: Keyla Strong  : 1965  MRN: [id number]    Date of Admission: 2024  Primary Care Physician: Janessa Mccall APRN    Subjective   Subjective     CC:  F/U BLE pain    HPI:  States that she feels \"really stressed out.\"  Nurse notes that patient started hallucinating through the night and is refusing meds, pulled out her IV.  Later this morning she left in the elevator and security had to bring her back to her room.        Objective   Objective     Vital Signs:   Temp:  [98 °F (36.7 °C)-98.4 °F (36.9 °C)] 98.4 °F (36.9 °C)  Heart Rate:  [65-83] 72  Resp:  [15-18] 18  BP: (108-131)/(49-57) 123/55     Physical Exam:  Gen-no acute distress, becoming loud and near combative my 2nd visit.   HENT-NCAT, mucous membranes moist, poor dentition   CV-RRR, S1 S2 normal, no m/r/g  Resp-CTAB, no wheezes or rales  Abd-soft, NT, ND, +BS  Ext-no edema  Neuro-A&Ox3, no focal deficits  Skin-BLE lesions noted, psoriatic appearing but with surrounding erythema and some drainage seen, worse on the LLE.  From diaz could see patient picking at lesions on her LE's  Psych-appropriate mood      Results Reviewed:  LAB RESULTS:      Lab 24  0431 24  1130 245   WBC 8.38 10.99* 14.19*   HEMOGLOBIN 9.5* 11.7* 10.8*   HEMATOCRIT 29.4* 36.7 32.8*   PLATELETS 334 380 345   NEUTROS ABS  --  8.97* 11.77*   IMMATURE GRANS (ABS)  --  0.06* 0.05   LYMPHS ABS  --  0.78 1.01*   MONOS ABS  --  0.88 1.09*   EOS ABS  --  0.24 0.22   MCV 91.0 90.2 89         Lab 24  1617 24  0431 24  1130   SODIUM  --  137 138   POTASSIUM 4.9 3.3* 3.7   CHLORIDE  --  101 100   CO2  --  25.0 26.0   ANION GAP  --  11.0 12.0   BUN  --  16 19   CREATININE  --  0.74 0.67   EGFR  --  93.3 100.8   GLUCOSE  --  107* 116*   CALCIUM  --  8.1* 9.0         Lab 24  0431 24  1130   TOTAL PROTEIN 5.7* 7.4   ALBUMIN 2.9* 3.7   GLOBULIN 2.8 3.7 "   ALT (SGPT) 9 11   AST (SGOT) 15 16   BILIRUBIN 0.2 0.2   ALK PHOS 76 94                     Brief Urine Lab Results  (Last result in the past 365 days)        Color   Clarity   Blood   Leuk Est   Nitrite   Protein   CREAT   Urine HCG        12/16/23 1336 Yellow   Clear   Negative   Negative   Negative   Negative                   Microbiology Results Abnormal       None            No radiology results from the last 24 hrs        Current medications:  Scheduled Meds:amoxicillin-clavulanate, 1 tablet, Oral, Q12H  enoxaparin, 40 mg, Subcutaneous, Daily  mupirocin, 1 application , Topical, Q12H  nicotine, 1 patch, Transdermal, Q24H  senna-docusate sodium, 2 tablet, Oral, BID  sodium chloride, 10 mL, Intravenous, Q12H  sulfamethoxazole-trimethoprim, 1 tablet, Oral, Q12H      Continuous Infusions:   PRN Meds:.  senna-docusate sodium **AND** polyethylene glycol **AND** bisacodyl **AND** bisacodyl    Calcium Replacement - Follow Nurse / BPA Driven Protocol    hydrOXYzine    ibuprofen    Magnesium Standard Dose Replacement - Follow Nurse / BPA Driven Protocol    nitroglycerin    ondansetron ODT **OR** ondansetron    Phosphorus Replacement - Follow Nurse / BPA Driven Protocol    Potassium Replacement - Follow Nurse / BPA Driven Protocol    sodium chloride    sodium chloride    sodium chloride    ziprasidone    Assessment & Plan   Assessment & Plan     Active Hospital Problems    Diagnosis  POA    **Leg pain [M79.606]  Yes    Psoriasis [L40.9]  Yes    Homeless [Z59.00]  Not Applicable    Anxiety associated with depression [F41.8]  Yes    Ecthyma [L08.0]  Yes      Resolved Hospital Problems   No resolved problems to display.        Brief Hospital Course to date:  Keyla Strong is a 59 y.o. female with a past medical history of schizophrenia, psoriasis, and current homelessness that presented to the ED complaining of bilateral leg pain. Had been seen at  the day prior, was prescribed ATBx and sent home, however did not  "fill her prescription as her pain was too bad, so she presented to [facility] ED.    This patient's problems and plans were partially entered by my partner and updated as appropriate by me 01/26/24. Copied text in this note has been reviewed and is accurate as of today's date.      Psoriasis vs Ecthyma  Bilateral Leg Pain  --Could be Staph or Strep  --WOC  --Wrap bilateral shins  --Bactrim plus Augmentin for coverage  --Bilateral tib/fib x-ray: Apparent soft tissue irregularity at the medial aspect of the high left ankle. Suspected generalized subcutaneous edema bilaterally. No radiopaque foreign body.   --PT/OT    Hypokalemia  --Replace per protocol     Homelessness  --CM in ED tried to get patient discharged to homeless shelter from ED but no shelter would take her due to hx of \"prior actions\"  --Patient ready for discharge on PO antibiotics as soon as safe discharge plan is arranged; social work/case management following     Anxiety with Depression  Schizophrenia   --Not on any home meds  --today having hallucinations and becoming combative.  Refusing treatment, pulled out IV.  She tried to elope but since having hallucinations over night I don't feel comfortable letting her leave on her own unless a family member comes to hospital to take patient home  --s/p IM geodon.  Restraints if needed  --psych consult--has appointment with telepsych on Monday at 4pm    Tobacco abuse  --Nicotine patch    Expected Discharge Location and Transportation: home  Expected Discharge   Expected Discharge Date: 1/26/2024; Expected Discharge Time:      DVT prophylaxis:  Medical DVT prophylaxis orders are present.         AM-PAC 6 Clicks Score (PT): 19 (01/25/24 4170)    CODE STATUS:   Code Status and Medical Interventions:   Ordered at: 01/24/24 4442     Level Of Support Discussed With:    Patient     Code Status (Patient has no pulse and is not breathing):    CPR (Attempt to Resuscitate)     Medical Interventions (Patient has pulse or is " breathing):    Full Support       Darell Bunch MD  01/26/24

## 2024-01-27 LAB
ANION GAP SERPL CALCULATED.3IONS-SCNC: 11 MMOL/L (ref 5–15)
BUN SERPL-MCNC: 14 MG/DL (ref 6–20)
BUN/CREAT SERPL: 16.3 (ref 7–25)
CALCIUM SPEC-SCNC: 8.5 MG/DL (ref 8.6–10.5)
CHLORIDE SERPL-SCNC: 101 MMOL/L (ref 98–107)
CO2 SERPL-SCNC: 25 MMOL/L (ref 22–29)
CREAT SERPL-MCNC: 0.86 MG/DL (ref 0.57–1)
DEPRECATED RDW RBC AUTO: 43.3 FL (ref 37–54)
EGFRCR SERPLBLD CKD-EPI 2021: 77.9 ML/MIN/1.73
ERYTHROCYTE [DISTWIDTH] IN BLOOD BY AUTOMATED COUNT: 12.9 % (ref 12.3–15.4)
GLUCOSE SERPL-MCNC: 107 MG/DL (ref 65–99)
HCT VFR BLD AUTO: 31.1 % (ref 34–46.6)
HGB BLD-MCNC: 9.9 G/DL (ref 12–15.9)
MAGNESIUM SERPL-MCNC: 1.9 MG/DL (ref 1.6–2.6)
MCH RBC QN AUTO: 28.9 PG (ref 26.6–33)
MCHC RBC AUTO-ENTMCNC: 31.8 G/DL (ref 31.5–35.7)
MCV RBC AUTO: 90.7 FL (ref 79–97)
PLATELET # BLD AUTO: 400 10*3/MM3 (ref 140–450)
PMV BLD AUTO: 9.7 FL (ref 6–12)
POTASSIUM SERPL-SCNC: 4.5 MMOL/L (ref 3.5–5.2)
QT INTERVAL: 376 MS
QTC INTERVAL: 419 MS
RBC # BLD AUTO: 3.43 10*6/MM3 (ref 3.77–5.28)
SODIUM SERPL-SCNC: 137 MMOL/L (ref 136–145)
WBC NRBC COR # BLD AUTO: 7.65 10*3/MM3 (ref 3.4–10.8)

## 2024-01-27 PROCEDURE — 85027 COMPLETE CBC AUTOMATED: CPT | Performed by: INTERNAL MEDICINE

## 2024-01-27 PROCEDURE — 97165 OT EVAL LOW COMPLEX 30 MIN: CPT

## 2024-01-27 PROCEDURE — 99232 SBSQ HOSP IP/OBS MODERATE 35: CPT | Performed by: INTERNAL MEDICINE

## 2024-01-27 PROCEDURE — 83735 ASSAY OF MAGNESIUM: CPT | Performed by: INTERNAL MEDICINE

## 2024-01-27 PROCEDURE — 80048 BASIC METABOLIC PNL TOTAL CA: CPT | Performed by: INTERNAL MEDICINE

## 2024-01-27 PROCEDURE — G0378 HOSPITAL OBSERVATION PER HR: HCPCS

## 2024-01-27 RX ORDER — DIPHENHYDRAMINE HYDROCHLORIDE, ZINC ACETATE 2; .1 G/100G; G/100G
1 CREAM TOPICAL 3 TIMES DAILY PRN
Status: DISCONTINUED | OUTPATIENT
Start: 2024-01-27 | End: 2024-01-30 | Stop reason: HOSPADM

## 2024-01-27 RX ADMIN — WHITE PETROLATUM 1 APPLICATION: 1.75 OINTMENT TOPICAL at 10:08

## 2024-01-27 RX ADMIN — AMOXICILLIN AND CLAVULANATE POTASSIUM 1 TABLET: 875; 125 TABLET, FILM COATED ORAL at 10:08

## 2024-01-27 RX ADMIN — HYDROXYZINE HYDROCHLORIDE 25 MG: 25 TABLET, FILM COATED ORAL at 13:08

## 2024-01-27 NOTE — PLAN OF CARE
Goal Outcome Evaluation:  Plan of Care Reviewed With: patient           Outcome Evaluation: Pt ind to don/doff socks long sitting in bed,  ind sink side grooming,  ind toilet transfer and ind post toileting hygiene, supervision to ambulate in room without AD.  No deficits which would require OT intervention at this time. No further skilled OT indicated at this time. Recommend return home with family/friend or homeless shelter if possible.      Anticipated Discharge Disposition (OT): other (see comments) (home with family/friend or return to homeless shelter if possible)

## 2024-01-27 NOTE — PROGRESS NOTES
"    [facility] Medicine Services  PROGRESS NOTE    Patient Name: Keyla Strong  : 1965  MRN: [id number]    Date of Admission: 2024  Primary Care Physician: Janessa Mccall APRN    Subjective   Subjective     CC:  F/U BLE pain    HPI:  Feels ok today.  No further events.  States that diagnosed with schizophrenia but says she stopped seeing psych about 5-6 years ago and has been off meds.  C/o that skin is pruritic, declines antihistamine PO or cream, thinks it's d/t her abx and wants it changed to the \"red antibiotics\" that she states she did well with in the past        Objective   Objective     Vital Signs:   Temp:  [97.4 °F (36.3 °C)-98.3 °F (36.8 °C)] 97.4 °F (36.3 °C)  Heart Rate:  [67-93] 67  Resp:  [16-20] 18  BP: ()/(49-65) 125/57     Physical Exam:  Gen-no acute distress, awake and alert, calm today  HENT-NCAT, mucous membranes moist, poor dentition   CV-RRR, S1 S2 normal, no m/r/g  Resp-CTAB, no wheezes or rales  Abd-soft, NT, ND, +BS  Ext-no edema  Neuro-A&Ox3, no focal deficits  Skin-BLE lesions noted, psoriatic appearing but with surrounding erythema and some drainage seen, worse on the LLE.      Results Reviewed:  LAB RESULTS:      Lab 24  04324  1130 24   WBC 7.65 8.38 10.99* 14.19*   HEMOGLOBIN 9.9* 9.5* 11.7* 10.8*   HEMATOCRIT 31.1* 29.4* 36.7 32.8*   PLATELETS 400 334 380 345   NEUTROS ABS  --   --  8.97* 11.77*   IMMATURE GRANS (ABS)  --   --  0.06* 0.05   LYMPHS ABS  --   --  0.78 1.01*   MONOS ABS  --   --  0.88 1.09*   EOS ABS  --   --  0.24 0.22   MCV 90.7 91.0 90.2 89         Lab 24  0431 24  1617 24  0431 24  1130   SODIUM 137  --  137 138   POTASSIUM 4.5 4.9 3.3* 3.7   CHLORIDE 101  --  101 100   CO2 25.0  --  25.0 26.0   ANION GAP 11.0  --  11.0 12.0   BUN 14  --  16 19   CREATININE 0.86  --  0.74 0.67   EGFR 77.9  --  93.3 100.8   GLUCOSE 107*  --  107* 116*   CALCIUM 8.5*  --  " 8.1* 9.0   MAGNESIUM 1.9  --   --   --          Lab 01/25/24  0431 01/24/24  1130   TOTAL PROTEIN 5.7* 7.4   ALBUMIN 2.9* 3.7   GLOBULIN 2.8 3.7   ALT (SGPT) 9 11   AST (SGOT) 15 16   BILIRUBIN 0.2 0.2   ALK PHOS 76 94                     Brief Urine Lab Results  (Last result in the past 365 days)        Color   Clarity   Blood   Leuk Est   Nitrite   Protein   CREAT   Urine HCG        12/16/23 1336 Yellow   Clear   Negative   Negative   Negative   Negative                   Microbiology Results Abnormal       None            No radiology results from the last 24 hrs        Current medications:  Scheduled Meds:amoxicillin-clavulanate, 1 tablet, Oral, Q12H  enoxaparin, 40 mg, Subcutaneous, Daily  mineral oil-hydrophilic petrolatum, 1 application , Topical, BID  mupirocin, 1 application , Topical, Q12H  nicotine, 1 patch, Transdermal, Q24H  senna-docusate sodium, 2 tablet, Oral, BID  sodium chloride, 10 mL, Intravenous, Q12H  sulfamethoxazole-trimethoprim, 1 tablet, Oral, Q12H      Continuous Infusions:   PRN Meds:.  senna-docusate sodium **AND** polyethylene glycol **AND** bisacodyl **AND** bisacodyl    Calcium Replacement - Follow Nurse / BPA Driven Protocol    hydrOXYzine    ibuprofen    Magnesium Standard Dose Replacement - Follow Nurse / BPA Driven Protocol    nitroglycerin    ondansetron ODT **OR** ondansetron    Phosphorus Replacement - Follow Nurse / BPA Driven Protocol    Potassium Replacement - Follow Nurse / BPA Driven Protocol    sodium chloride    sodium chloride    sodium chloride    ziprasidone    Assessment & Plan   Assessment & Plan     Active Hospital Problems    Diagnosis  POA    **Leg pain [M79.606]  Yes    Psoriasis [L40.9]  Yes    Homeless [Z59.00]  Not Applicable    Anxiety associated with depression [F41.8]  Yes    Ecthyma [L08.0]  Yes      Resolved Hospital Problems   No resolved problems to display.        Brief Hospital Course to date:  Keyla Strong is a 59 y.o. female with a past medical  "history of schizophrenia, psoriasis, and current homelessness that presented to the ED complaining of bilateral leg pain. Had been seen at  the day prior, was prescribed ATBx and sent home, however did not fill her prescription as her pain was too bad, so she presented to [facility] ED.    This patient's problems and plans were partially entered by my partner and updated as appropriate by me 01/27/24. Copied text in this note has been reviewed and is accurate as of today's date.      Psoriasis vs Ecthyma  Bilateral Leg Pain  --Could be Staph or Strep  --WOC  --Wrap bilateral shins  --Bactrim plus Augmentin for coverage  --Bilateral tib/fib x-ray: Apparent soft tissue irregularity at the medial aspect of the high left ankle. Suspected generalized subcutaneous edema bilaterally. No radiopaque foreign body.   --PT/OT    Hypokalemia  --Replace per protocol     Homelessness  --CM in ED tried to get patient discharged to homeless shelter from ED but no shelter would take her due to hx of \"prior actions\"  --Patient ready for discharge on PO antibiotics as soon as safe discharge plan is arranged; social work/case management following     Anxiety with Depression  Schizophrenia   --Not on any home meds  --on 1/26 was having hallucinations and becoming combative. -s/p IM geodon on 1/26.  Much more calm today  --psych consult--has appointment with telepsych now on Tuesday at 4pm    Tobacco abuse  --Nicotine patch    Expected Discharge Location and Transportation: home  Expected Discharge   Expected Discharge Date: 1/27/2024; Expected Discharge Time:      DVT prophylaxis:  Medical DVT prophylaxis orders are present.         AM-PAC 6 Clicks Score (PT): 24 (01/27/24 5774)    CODE STATUS:   Code Status and Medical Interventions:   Ordered at: 01/24/24 0174     Level Of Support Discussed With:    Patient     Code Status (Patient has no pulse and is not breathing):    CPR (Attempt to Resuscitate)     Medical Interventions (Patient has " pulse or is breathing):    Full Support       Darell Bunch MD  01/27/24

## 2024-01-27 NOTE — THERAPY DISCHARGE NOTE
Acute Care - Occupational Therapy Discharge  [facility]    Patient Name: Keyla Strong  : 1965    MRN: [id number]                              Today's Date: 2024       Admit Date: 2024    Visit Dx:     ICD-10-CM ICD-9-CM   1. Bilateral lower leg cellulitis  L03.116 682.6    L03.115    2. Homelessness  Z59.00 V60.0   3. Impaired mobility  Z74.09 799.89   4. Pain in both lower extremities  M79.604 729.5    M79.605      Patient Active Problem List   Diagnosis    Well woman exam    Leg pain    Psoriasis    Homeless    Anxiety associated with depression    Ecthyma     Past Medical History:   Diagnosis Date    Anxiety     Depression     Homelessness     Migraine     Obesity     Psoriasis      Past Surgical History:   Procedure Laterality Date    NO PAST SURGERIES        General Information       Row Name 24 1443          OT Time and Intention    Document Type discharge evaluation/summary  -AC     Mode of Treatment occupational therapy  -AC       Row Name 24 1443          General Information    Patient Profile Reviewed yes  -AC     Prior Level of Function independent:;all household mobility;community mobility;ADL's  -AC     Barriers to Rehab medically complex  -AC       Row Name 24 1443          Living Environment    People in Home --  homeless  -AC       Row Name 24 1443          Cognition    Orientation Status (Cognition) oriented x 3  -AC       Row Name 24 1443          Safety Issues, Functional Mobility    Safety Issues Affecting Function (Mobility) insight into deficits/self-awareness;judgment  -AC     Impairments Affecting Function (Mobility) pain  -AC               User Key  (r) = Recorded By, (t) = Taken By, (c) = Cosigned By      Initials Name Provider Type    AC Anahi Wilson OT Occupational Therapist                   Mobility/ADL's       Row Name 24 1445          Bed Mobility    Bed Mobility supine-sit;sit-supine  -AC     Supine-Sit [place] (Bed  Mobility) modified independence  -AC     Sit-Supine [place] (Bed Mobility) modified independence  -AC     Assistive Device (Bed Mobility) head of bed elevated  -       Row Name 01/27/24 1445          Transfers    Transfers sit-stand transfer  -       Row Name 01/27/24 1445          Sit-Stand Transfer    Sit-Stand [place] (Transfers) independent  -       Row Name 01/27/24 1445          Functional Mobility    Functional Mobility- Ind. Level supervision required  -     Functional Mobility- Device other (see comments)  No AD  -     Functional Mobility-Distance (Feet) 40  -     Functional Mobility- Safety Issues step length decreased  -     Functional Mobility- Comment pt reporting legs feel heavy; slow guarded pace  -       Row Name 01/27/24 1445          Activities of Daily Living    BADL Assessment/Intervention lower body dressing;grooming;toileting  -       Row Name 01/27/24 1445          Lower Body Dressing Assessment/Training    [place] Level (Lower Body Dressing) doff;don  -     Position (Lower Body Dressing) long sitting;sitting up in bed  -     Comment, (Lower Body Dressing) increased time  -       Row Name 01/27/24 1445          Grooming Assessment/Training    [place] Level (Grooming) hair care, combing/brushing;oral care regimen;wash face, hands;independent  -     Position (Grooming) unsupported standing  -       Row Name 01/27/24 1445          Toileting Assessment/Training    [place] Level (Toileting) adjust/manage clothing;perform perineal hygiene;independent  -     Assistive Devices (Toileting) commode;grab bar/safety frame  -     Position (Toileting) unsupported sitting;unsupported standing  -               User Key  (r) = Recorded By, (t) = Taken By, (c) = Cosigned By      Initials Name Provider Type    Anahi Washburn, OT Occupational Therapist                   Obj/Interventions       Row Name 01/27/24 8463          Sensory Assessment  (Somatosensory)    Sensory Assessment (Somatosensory) UE sensation intact  -       Row Name 01/27/24 1447          Range of Motion Comprehensive    General Range of Motion bilateral upper extremity ROM WNL  -       Row Name 01/27/24 1447          Strength Comprehensive (MMT)    Comment, General Manual Muscle Testing (MMT) Assessment BUE grossly 4/5  -       Row Name 01/27/24 1447          Balance    Balance Assessment sitting static balance;sitting dynamic balance;standing static balance;standing dynamic balance  -AC     Static Sitting Balance independent  -AC     Dynamic Sitting Balance independent  -AC     Position, Sitting Balance unsupported;sitting edge of bed  -AC     Static Standing Balance independent  -AC     Dynamic Standing Balance supervision  -AC     Position/Device Used, Standing Balance unsupported  -AC               User Key  (r) = Recorded By, (t) = Taken By, (c) = Cosigned By      Initials Name Provider Type    Anahi Washburn, OT Occupational Therapist                   Goals/Plan    No documentation.                  Clinical Impression       Row Name 01/27/24 1448          Pain Assessment    Pretreatment Pain Rating 2/10  -     Posttreatment Pain Rating 2/10  -AC     Pain Location - Side/Orientation Bilateral  -AC     Pain Location lower  -AC     Pain Location - extremity  -AC     Pain Intervention(s) Repositioned  -       Row Name 01/27/24 1448          Plan of Care Review    Plan of Care Reviewed With patient  -AC     Outcome Evaluation Pt ind to don/doff socks long sitting in bed,  ind sink side grooming,  ind toilet transfer and ind post toileting hygiene, supervision to ambulate in room without AD.  No deficits which would require OT intervention at this time. No further skilled OT indicated at this time. Recommend return home with family/friend or homeless shelter if possible.  -       Row Name 01/27/24 1448          Therapy Assessment/Plan (OT)    Criteria for Skilled  Therapeutic Interventions Met (OT) no;no problems identified which require skilled intervention  -AC     Therapy Frequency (OT) evaluation only  -AC       Row Name 01/27/24 1448          Therapy Plan Review/Discharge Plan (OT)    Anticipated Discharge Disposition (OT) other (see comments)  home with family/friend or return to homeless shelter if possible  -AC       Row Name 01/27/24 1448          Positioning and Restraints    Pre-Treatment Position in bed  -AC     Post Treatment Position bed  -AC     In Bed notified nsg;fowlers;call light within reach;encouraged to call for assist;exit alarm on  -AC               User Key  (r) = Recorded By, (t) = Taken By, (c) = Cosigned By      Initials Name Provider Type    AC Anahi Wilson, OT Occupational Therapist                   Outcome Measures       Row Name 01/27/24 9668          How much help from another is currently needed...    Putting on and taking off regular lower body clothing? 4  -AC     Bathing (including washing, rinsing, and drying) 4  -AC     Toileting (which includes using toilet bed pan or urinal) 4  -AC     Putting on and taking off regular upper body clothing 4  -AC     Taking care of personal grooming (such as brushing teeth) 4  -AC     Eating meals 4  -AC     AM-PAC 6 Clicks Score (OT) 24  -AC       Row Name 01/27/24 4973          How much help from another person do you currently need...    Turning from your back to your side while in flat bed without using bedrails? 4  -RK     Moving from lying on back to sitting on the side of a flat bed without bedrails? 4  -RK     Moving to and from a bed to a chair (including a wheelchair)? 4  -RK     Standing up from a chair using your arms (e.g., wheelchair, bedside chair)? 4  -RK     Climbing 3-5 steps with a railing? 4  -RK     To walk in hospital room? 4  -RK     AM-PAC 6 Clicks Score (PT) 24  -RK     Highest Level of Mobility Goal 8 --> Walked 250 feet or more  -RK       Row Name 01/27/24 8134           Functional Assessment    Outcome Measure Options AM-PAC 6 Clicks Daily Activity (OT)  -               User Key  (r) = Recorded By, (t) = Taken By, (c) = Cosigned By      Initials Name Provider Type    AC Anahi Wilson, OT Occupational Therapist    Belen Mishra RN Registered Nurse                  Occupational Therapy Education       Title: PT OT SLP Therapies (In Progress)       Topic: Occupational Therapy (In Progress)       Point: ADL training (Done)       Description:   Instruct learner(s) on proper safety adaptation and remediation techniques during self care or transfers.   Instruct in proper use of assistive devices.                  Learning Progress Summary             Patient Acceptance, E, VU by  at 1/27/2024 3962                         Point: Home exercise program (Not Started)       Description:   Instruct learner(s) on appropriate technique for monitoring, assisting and/or progressing therapeutic exercises/activities.                  Learner Progress:  Not documented in this visit.              Point: Precautions (Not Started)       Description:   Instruct learner(s) on prescribed precautions during self-care and functional transfers.                  Learner Progress:  Not documented in this visit.              Point: Body mechanics (Not Started)       Description:   Instruct learner(s) on proper positioning and spine alignment during self-care, functional mobility activities and/or exercises.                  Learner Progress:  Not documented in this visit.                              User Key       Initials Effective Dates Name Provider Type Discipline     02/03/23 -  Anahi Wilson, OT Occupational Therapist OT                  OT Recommendation and Plan  Therapy Frequency (OT): evaluation only  Plan of Care Review  Plan of Care Reviewed With: patient  Outcome Evaluation: Pt ind to don/doff socks long sitting in bed,  ind sink side grooming,  ind toilet transfer and ind post toileting  hygiene, supervision to ambulate in room without AD.  No deficits which would require OT intervention at this time. No further skilled OT indicated at this time. Recommend return home with family/friend or homeless shelter if possible.  Plan of Care Reviewed With: patient  Outcome Evaluation: Pt ind to don/doff socks long sitting in bed,  ind sink side grooming,  ind toilet transfer and ind post toileting hygiene, supervision to ambulate in room without AD.  No deficits which would require OT intervention at this time. No further skilled OT indicated at this time. Recommend return home with family/friend or homeless shelter if possible.     Time Calculation:   Evaluation Complexity (OT)  Review Occupational Profile/Medical/Therapy History Complexity: brief/low complexity  Assessment, Occupational Performance/Identification of Deficit Complexity: 1-3 performance deficits  Clinical Decision Making Complexity (OT): problem focused assessment/low complexity  Overall Complexity of Evaluation (OT): low complexity     Time Calculation- OT       Row Name 01/27/24 1345             Time Calculation- OT    OT Start Time 1345  -AC      OT Received On 01/27/24  -AC         Untimed Charges    OT Eval/Re-eval Minutes 44  -AC         Total Minutes    Untimed Charges Total Minutes 44  -AC       Total Minutes 44  -AC                User Key  (r) = Recorded By, (t) = Taken By, (c) = Cosigned By      Initials Name Provider Type    AC Anahi Wilson, OT Occupational Therapist                  Therapy Charges for Today       Code Description Service Date Service Provider Modifiers Qty    [id number]  OT EVAL LOW COMPLEXITY 3 1/27/2024 Anahi Wilson OT GO 1               OT Discharge Summary  Anticipated Discharge Disposition (OT): other (see comments) (home with family/friend or return to homeless shelter if possible)    Anahi Wilson OT  1/27/2024

## 2024-01-28 PROCEDURE — G0378 HOSPITAL OBSERVATION PER HR: HCPCS

## 2024-01-28 PROCEDURE — 99232 SBSQ HOSP IP/OBS MODERATE 35: CPT | Performed by: INTERNAL MEDICINE

## 2024-01-28 RX ADMIN — MUPIROCIN 1 APPLICATION: 20 OINTMENT TOPICAL at 21:28

## 2024-01-28 RX ADMIN — WHITE PETROLATUM 1 APPLICATION: 1.75 OINTMENT TOPICAL at 21:28

## 2024-01-28 RX ADMIN — WHITE PETROLATUM 1 APPLICATION: 1.75 OINTMENT TOPICAL at 09:42

## 2024-01-28 RX ADMIN — MUPIROCIN 1 APPLICATION: 20 OINTMENT TOPICAL at 09:43

## 2024-01-28 NOTE — PROGRESS NOTES
[facility]  PROGRESS NOTE    Patient Name: Keyla Strong  : 1965  MRN: [id number]    Date of Admission: 2024  Primary Care Physician: Janessa Mccall APRN    Subjective   Subjective     CC:  F/U BLE pain    HPI:  States that doing ok.  Pruritus better.        Objective   Objective     Vital Signs:   Temp:  [98 °F (36.7 °C)-98.2 °F (36.8 °C)] 98 °F (36.7 °C)  Heart Rate:  [62-79] 79  Resp:  [16-18] 16  BP: (115-125)/(41-67) 125/67     Physical Exam:  Gen-no acute distress, awake and alert, calm today  HENT-NCAT, mucous membranes moist, poor dentition   CV-RRR, S1 S2 normal, no m/r/g  Resp-CTAB, no wheezes or rales  Abd-soft, NT, ND, +BS  Ext-no edema  Neuro-A&Ox3, no focal deficits  Skin-BLE lesions noted, psoriatic appearing but with surrounding erythema and some drainage seen, worse on the LLE.      Results Reviewed:  LAB RESULTS:      Lab 24  04324  1130 24   WBC 7.65 8.38 10.99* 14.19*   HEMOGLOBIN 9.9* 9.5* 11.7* 10.8*   HEMATOCRIT 31.1* 29.4* 36.7 32.8*   PLATELETS 400 334 380 345   NEUTROS ABS  --   --  8.97* 11.77*   IMMATURE GRANS (ABS)  --   --  0.06* 0.05   LYMPHS ABS  --   --  0.78 1.01*   MONOS ABS  --   --  0.88 1.09*   EOS ABS  --   --  0.24 0.22   MCV 90.7 91.0 90.2 89         Lab 24  04324  1617 24  04324  1130   SODIUM 137  --  137 138   POTASSIUM 4.5 4.9 3.3* 3.7   CHLORIDE 101  --  101 100   CO2 25.0  --  25.0 26.0   ANION GAP 11.0  --  11.0 12.0   BUN 14  --  16 19   CREATININE 0.86  --  0.74 0.67   EGFR 77.9  --  93.3 100.8   GLUCOSE 107*  --  107* 116*   CALCIUM 8.5*  --  8.1* 9.0   MAGNESIUM 1.9  --   --   --          Lab 24  0431 24  1130   TOTAL PROTEIN 5.7* 7.4   ALBUMIN 2.9* 3.7   GLOBULIN 2.8 3.7   ALT (SGPT) 9 11   AST (SGOT) 15 16   BILIRUBIN 0.2 0.2   ALK PHOS 76 94                     Brief Urine Lab Results  (Last result in the past 365 days)         Color   Clarity   Blood   Leuk Est   Nitrite   Protein   CREAT   Urine HCG        12/16/23 1336 Yellow   Clear   Negative   Negative   Negative   Negative                   Microbiology Results Abnormal       None            No radiology results from the last 24 hrs        Current medications:  Scheduled Meds:amoxicillin-clavulanate, 1 tablet, Oral, Q12H  enoxaparin, 40 mg, Subcutaneous, Daily  mineral oil-hydrophilic petrolatum, 1 application , Topical, BID  mupirocin, 1 application , Topical, Q12H  nicotine, 1 patch, Transdermal, Q24H  senna-docusate sodium, 2 tablet, Oral, BID  sodium chloride, 10 mL, Intravenous, Q12H  sulfamethoxazole-trimethoprim, 1 tablet, Oral, Q12H      Continuous Infusions:   PRN Meds:.  senna-docusate sodium **AND** polyethylene glycol **AND** bisacodyl **AND** bisacodyl    Calcium Replacement - Follow Nurse / BPA Driven Protocol    diphenhydrAMINE-zinc acetate    hydrOXYzine    ibuprofen    Magnesium Standard Dose Replacement - Follow Nurse / BPA Driven Protocol    nitroglycerin    ondansetron ODT **OR** ondansetron    Phosphorus Replacement - Follow Nurse / BPA Driven Protocol    Potassium Replacement - Follow Nurse / BPA Driven Protocol    sodium chloride    sodium chloride    sodium chloride    ziprasidone    Assessment & Plan   Assessment & Plan     Active Hospital Problems    Diagnosis  POA    **Leg pain [M79.606]  Yes    Psoriasis [L40.9]  Yes    Homeless [Z59.00]  Not Applicable    Anxiety associated with depression [F41.8]  Yes    Ecthyma [L08.0]  Yes      Resolved Hospital Problems   No resolved problems to display.        Brief Hospital Course to date:  Keyla Strong is a 59 y.o. female with a past medical history of schizophrenia, psoriasis, and current homelessness that presented to the ED complaining of bilateral leg pain. Had been seen at  the day prior, was prescribed ATBx and sent home, however did not fill her prescription as her pain was too bad, so she  "presented to [facility] ED.    This patient's problems and plans were partially entered by my partner and updated as appropriate by me 01/28/24. Copied text in this note has been reviewed and is accurate as of today's date.      Psoriasis vs Ecthyma  Bilateral Leg Pain  --Could be Staph or Strep  --WOC  --Wrap bilateral shins  --Bactrim plus Augmentin for coverage  --Bilateral tib/fib x-ray: Apparent soft tissue irregularity at the medial aspect of the high left ankle. Suspected generalized subcutaneous edema bilaterally. No radiopaque foreign body.   --PT/OT    Hypokalemia  --Replace per protocol     Homelessness  --CM in ED tried to get patient discharged to homeless shelter from ED but no shelter would take her due to hx of \"prior actions\"  --here awaiting safe discharge plan is arranged; social work/case management following     Anxiety with Depression  Schizophrenia   --Not on any home meds  --on 1/26 was having hallucinations and becoming combative. -s/p IM geodon on 1/26.  Much more calm today  --psych consult--has appointment with telepsych now on Tuesday at 4pm    Tobacco abuse  --Nicotine patch    Expected Discharge Location and Transportation: home  Expected Discharge   Expected Discharge Date: 1/30/2024; Expected Discharge Time:      DVT prophylaxis:  Medical DVT prophylaxis orders are present.         AM-PAC 6 Clicks Score (PT): 24 (01/27/24 2000)    CODE STATUS:   Code Status and Medical Interventions:   Ordered at: 01/24/24 4479     Level Of Support Discussed With:    Patient     Code Status (Patient has no pulse and is not breathing):    CPR (Attempt to Resuscitate)     Medical Interventions (Patient has pulse or is breathing):    Full Support       Darell Bunch MD  01/28/24      "

## 2024-01-29 PROCEDURE — G0378 HOSPITAL OBSERVATION PER HR: HCPCS

## 2024-01-29 PROCEDURE — 99232 SBSQ HOSP IP/OBS MODERATE 35: CPT | Performed by: NURSE PRACTITIONER

## 2024-01-29 RX ORDER — CEPHALEXIN 250 MG/1
500 CAPSULE ORAL EVERY 6 HOURS SCHEDULED
Status: DISCONTINUED | OUTPATIENT
Start: 2024-01-29 | End: 2024-01-30 | Stop reason: HOSPADM

## 2024-01-29 RX ORDER — TRIAMCINOLONE ACETONIDE 1 MG/ML
LOTION TOPICAL EVERY 12 HOURS SCHEDULED
Status: DISCONTINUED | OUTPATIENT
Start: 2024-01-29 | End: 2024-01-30 | Stop reason: HOSPADM

## 2024-01-29 RX ADMIN — IBUPROFEN 600 MG: 600 TABLET, FILM COATED ORAL at 10:46

## 2024-01-29 RX ADMIN — DIPHENHYDRAMINE HYDROCHLORIDE, ZINC ACETATE 1 APPLICATION: 2; .1 CREAM TOPICAL at 10:45

## 2024-01-29 RX ADMIN — CEPHALEXIN 500 MG: 250 CAPSULE ORAL at 23:04

## 2024-01-29 RX ADMIN — MUPIROCIN 1 APPLICATION: 20 OINTMENT TOPICAL at 10:45

## 2024-01-29 RX ADMIN — WHITE PETROLATUM 1 APPLICATION: 1.75 OINTMENT TOPICAL at 10:44

## 2024-01-29 RX ADMIN — CEPHALEXIN 500 MG: 250 CAPSULE ORAL at 16:47

## 2024-01-29 NOTE — CASE MANAGEMENT/SOCIAL WORK
Continued Stay Note  Psychiatric     Patient Name: Keyla Strong  MRN: [id number]  Today's Date: 1/29/2024    Admit Date: 1/24/2024    Plan: TBD   Discharge Plan       Row Name 01/29/24 1216       Plan    Plan TBD    Patient/Family in Agreement with Plan yes    Plan Comments Met with patient at bedside to update on discharge planning. Patient has psych eval at 1600 today. She is in agreement with psych eval.  has not been able to find placement. Patient was homeless when she came to the hospital.  will continue to follow and assist with discharge planning needs as indicated.    Final Discharge Disposition Code 30 - still a patient                   Discharge Codes    No documentation.                 Expected Discharge Date and Time       Expected Discharge Date Expected Discharge Time    Jan 30, 2024               Lupe Gomez RN

## 2024-01-29 NOTE — PROGRESS NOTES
[facility] Medicine Services  PROGRESS NOTE    Patient Name: Keyla Strong  : 1965  MRN: [id number]    Date of Admission: 2024  Primary Care Physician: Janessa Mccall APRN    Subjective   Subjective     CC:  F/u BLE pain    HPI:  Patient resting in bed.  She has been refusing her antibiotics and states this is because they make her itch and she does better on Keflex.  Is asking if she could be prescribed Keflex for her skin infection.  Otherwise denies concerns.      Objective   Objective     Vital Signs:   Temp:  [97.4 °F (36.3 °C)-98.3 °F (36.8 °C)] 97.4 °F (36.3 °C)  Heart Rate:  [67-81] 68  Resp:  [16-18] 16  BP: (113-135)/(46-74) 113/46     Physical Exam:  Constitutional: No acute distress, awake, alert  HENT: NCAT, mucous membranes moist  Respiratory: Clear to auscultation bilaterally, respiratory effort normal, room air  Cardiovascular: RRR, no murmurs, rubs, or gallops  Gastrointestinal: Positive bowel sounds, soft, nontender, nondistended  Musculoskeletal: No bilateral ankle edema  Psychiatric: Appropriate affect, cooperative  Neurologic: Oriented x 3, moves all extremities, Cranial Nerves grossly intact to confrontation, speech clear  Skin: Rash BLE and BUE      Results Reviewed:  LAB RESULTS:      Lab 24  1130 245   WBC 7.65 8.38 10.99* 14.19*   HEMOGLOBIN 9.9* 9.5* 11.7* 10.8*   HEMATOCRIT 31.1* 29.4* 36.7 32.8*   PLATELETS 400 334 380 345   NEUTROS ABS  --   --  8.97* 11.77*   IMMATURE GRANS (ABS)  --   --  0.06* 0.05   LYMPHS ABS  --   --  0.78 1.01*   MONOS ABS  --   --  0.88 1.09*   EOS ABS  --   --  0.24 0.22   MCV 90.7 91.0 90.2 89         Lab 24  04324  1617 24  04324  1130   SODIUM 137  --  137 138   POTASSIUM 4.5 4.9 3.3* 3.7   CHLORIDE 101  --  101 100   CO2 25.0  --  25.0 26.0   ANION GAP 11.0  --  11.0 12.0   BUN 14  --  16 19   CREATININE 0.86  --  0.74 0.67   EGFR 77.9  --   93.3 100.8   GLUCOSE 107*  --  107* 116*   CALCIUM 8.5*  --  8.1* 9.0   MAGNESIUM 1.9  --   --   --          Lab 01/25/24  0431 01/24/24  1130   TOTAL PROTEIN 5.7* 7.4   ALBUMIN 2.9* 3.7   GLOBULIN 2.8 3.7   ALT (SGPT) 9 11   AST (SGOT) 15 16   BILIRUBIN 0.2 0.2   ALK PHOS 76 94                     Brief Urine Lab Results  (Last result in the past 365 days)        Color   Clarity   Blood   Leuk Est   Nitrite   Protein   CREAT   Urine HCG        12/16/23 1336 Yellow   Clear   Negative   Negative   Negative   Negative                   Microbiology Results Abnormal       None            No radiology results from the last 24 hrs        Current medications:  Scheduled Meds:amoxicillin-clavulanate, 1 tablet, Oral, Q12H  enoxaparin, 40 mg, Subcutaneous, Daily  mineral oil-hydrophilic petrolatum, 1 application , Topical, BID  mupirocin, 1 application , Topical, Q12H  nicotine, 1 patch, Transdermal, Q24H  senna-docusate sodium, 2 tablet, Oral, BID  sodium chloride, 10 mL, Intravenous, Q12H  sulfamethoxazole-trimethoprim, 1 tablet, Oral, Q12H      Continuous Infusions:   PRN Meds:.  senna-docusate sodium **AND** polyethylene glycol **AND** bisacodyl **AND** bisacodyl    Calcium Replacement - Follow Nurse / BPA Driven Protocol    diphenhydrAMINE-zinc acetate    hydrOXYzine    ibuprofen    Magnesium Standard Dose Replacement - Follow Nurse / BPA Driven Protocol    nitroglycerin    ondansetron ODT **OR** ondansetron    Phosphorus Replacement - Follow Nurse / BPA Driven Protocol    Potassium Replacement - Follow Nurse / BPA Driven Protocol    sodium chloride    sodium chloride    sodium chloride    ziprasidone    Assessment & Plan   Assessment & Plan     Active Hospital Problems    Diagnosis  POA    **Leg pain [M79.606]  Yes    Psoriasis [L40.9]  Yes    Homeless [Z59.00]  Not Applicable    Anxiety associated with depression [F41.8]  Yes    Ecthyma [L08.0]  Yes      Resolved Hospital Problems   No resolved problems to display.  "       Brief Hospital Course to date:  Keyla Strong is a 59 y.o. female  with a past medical history of schizophrenia, psoriasis, and current homelessness who presented to [facility] ED complaining of bilateral leg pain. She had been seen at  the day prior, was prescribed ATBx and sent home but did not fill her prescription as her pain was too bad, so she presented to [facility].     This patient's problems and plans were partially entered by my partner and updated as appropriate by me 01/29/24.       Psoriasis vs Ecthyma  Bilateral Leg Pain  --Could be Staph or Strep, started on bactrim plus augmentin for coverage, WBCs now wnl  --WOC following for dressing changes, change q3days; also treating psoriasis with Aquaphor, steroids  --Bilateral tib/fib x-ray: Apparent soft tissue irregularity at the medial aspect of the high left ankle. Suspected generalized subcutaneous edema bilaterally. No radiopaque foreign body.   --PT/OT following  --change abx to Keflex x 7 days, add triamcinolone lotion     Hypokalemia  --Replace per protocol     Homelessness  --CM in ED tried to get patient discharged to homeless shelter from ED, but no shelter would take her due to hx of \"prior actions\"  --here awaiting safe discharge plan arrangements; social work/case management following     Anxiety with Depression  Schizophrenia   --Not on any home meds  --on 1/26 was having hallucinations and became combative, s/p IM geodon on 1/26 and calmer  --psych consult pending for Tuesday, 1/30, at 4pm     Tobacco abuse  --Nicotine patch    Expected Discharge Location and Transportation: SNF  Expected Discharge pending bed offer, insurance approval  Expected Discharge Date: 1/30/2024; Expected Discharge Time:      DVT prophylaxis:  Medical DVT prophylaxis orders are present.         AM-PAC 6 Clicks Score (PT): 24 (01/28/24 0800)    CODE STATUS:   Code Status and Medical Interventions:   Ordered at: 01/24/24 4154     Level Of Support Discussed With:    " Patient     Code Status (Patient has no pulse and is not breathing):    CPR (Attempt to Resuscitate)     Medical Interventions (Patient has pulse or is breathing):    Full Support       Stacey Rojo, APRN  01/29/24

## 2024-01-29 NOTE — NURSING NOTE
A&Ox4. VS stable. NSR. Room air. No IV, MD well aware. BLE wounds, drgs in place, DCI. Coccyx red blanchable, barrier cream applied, KARINA.  Pt refused all morning meds. MD notified. Continent and independent. Elopement precautions maintained. Bed alarm activated. Frequent safety check provided.

## 2024-01-29 NOTE — NURSING NOTE
A&Ox4/ anxious/ restless/ uncooperative. VS stable. NSR on tele. Room air. No IV, MD well aware. BLE wounds, drgs in place, DCI. Coccyx red blanchable, barrier cream applied, KARINA. Continent and independent. Elopement precautions maintained. Bed alarm activated. Frequent safety check provided.

## 2024-01-30 ENCOUNTER — READMISSION MANAGEMENT (OUTPATIENT)
Dept: CALL CENTER | Facility: HOSPITAL | Age: 59
End: 2024-01-30
Payer: MEDICAID

## 2024-01-30 VITALS
TEMPERATURE: 97.9 F | HEART RATE: 79 BPM | WEIGHT: 131.4 LBS | HEIGHT: 62 IN | SYSTOLIC BLOOD PRESSURE: 109 MMHG | OXYGEN SATURATION: 96 % | RESPIRATION RATE: 16 BRPM | BODY MASS INDEX: 24.18 KG/M2 | DIASTOLIC BLOOD PRESSURE: 56 MMHG

## 2024-01-30 PROCEDURE — 99214 OFFICE O/P EST MOD 30 MIN: CPT | Performed by: NURSE PRACTITIONER

## 2024-01-30 PROCEDURE — 99239 HOSP IP/OBS DSCHRG MGMT >30: CPT | Performed by: NURSE PRACTITIONER

## 2024-01-30 PROCEDURE — G0378 HOSPITAL OBSERVATION PER HR: HCPCS

## 2024-01-30 RX ORDER — CEPHALEXIN 500 MG/1
500 CAPSULE ORAL EVERY 6 HOURS SCHEDULED
Qty: 26 CAPSULE | Refills: 0 | Status: SHIPPED | OUTPATIENT
Start: 2024-01-30 | End: 2024-02-06

## 2024-01-30 RX ORDER — TRIAMCINOLONE ACETONIDE 1 MG/ML
LOTION TOPICAL EVERY 12 HOURS SCHEDULED
Qty: 60 ML | Refills: 0 | Status: SHIPPED | OUTPATIENT
Start: 2024-01-30

## 2024-01-30 RX ADMIN — TRIAMCINOLONE ACETONIDE: 1 LOTION TOPICAL at 09:19

## 2024-01-30 RX ADMIN — WHITE PETROLATUM 1 APPLICATION: 1.75 OINTMENT TOPICAL at 09:18

## 2024-01-30 RX ADMIN — MUPIROCIN 1 APPLICATION: 20 OINTMENT TOPICAL at 09:18

## 2024-01-30 NOTE — NURSING NOTE
Pt DC to shelter via Lyft/[employer]. VS stable. Denies pain D/N/V. Afebrile. BLL drgs in place, DCI. Provided pt with 2 days dressing change supplies. IV and tele monitor removed. All pt's belongings sent with pt.

## 2024-01-30 NOTE — PROGRESS NOTES
Continued Stay Note  [facility]     Patient Name: Keyla Strong  MRN: [id number]  Today's Date: 1/30/2024    Admit Date: 1/24/2024    Plan: TBD   Discharge Plan       Row Name 01/30/24 0930       Plan    Plan Comments There is a tele mental health assessment scheduled for 4 pm on Tuesday 1/30/2024. the nurse will need to be in the room a little before 4 pm to sign in to start the tele health assessment.                   Discharge Codes    No documentation.                 Expected Discharge Date and Time       Expected Discharge Date Expected Discharge Time    Jan 30, 2024               RENA Son

## 2024-01-30 NOTE — CONSULTS
HOSPITAL CONSULT       This provider is located at  [facility], located at [address], [address] and is conducting  a secure MyChart Video Visit through [employer]. Patient is being evaluated today  as an inpatient at [facility] located at [address] [place] [address] and states they are  in a secure environment for this appointment. The patient consents to be seen remotely, and when consent is given they understand that the consent allows for patient identifiable information to be sent to a third party as needed. They may refuse to be seen remotely at any time. The electronic data is encrypted and password protected, and the patient  has been advised of the potential risks to privacy not withstanding such measures. The patient's condition being diagnosed/treated is appropriate for telemedicine. The provider identified herself as well as her credentials to patient. Patient identity confirmed by having patient correctly state her name and .     Date:2024   Patient Name: Keyla Strong    : 1965    MRN: [id number]    Care Team: Patient Care Team:  Janessa Mccall APRN as PCP - General (Family Medicine)      Referring Provider: Janessa Mccall APRN   Reason for Consultation: hallucinations    Chief Complaint:      ICD-10-CM ICD-9-CM   1. Bilateral lower leg cellulitis  L03.116 682.6    L03.115    2. Homelessness  Z59.00 V60.0   3. Impaired mobility  Z74.09 799.89   4. Pain in both lower extremities  M79.604 729.5    M79.605         History of Present Illness:   Keyla Strong is  59 y.o..female currently hospitalized at [facility] for bilateral lower leg cellulitis, homelessness, impaired mobility. Behavioral Health was consulted to evaluate patient due to concerns regarding hallucinations. This is her initial encounter with this provider.    Patient appears focused during interaction. Linear and goal  "directed thought process. She shows no indication of responding to external stimuli throughout visit. She was mostly cooperative, makes good eye contact  and able to provide very basic historical information. She appears guarded, albeit not paranoid. She appears frustrated with being hospitalized and did not elaborate on questions provider asked, despite provider's efforts to engage her. She is dismissive regarding her psych history or any impairment she may be experiencing related to  her mental health. Most questions were answered with short responses and lacking any detail.      Patient states she was born and raised in [place]. Has brothers and sisters. 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 Currently receives [employer] for financial support. She is  with two daughters and six grandchildren. It does not appear patient has communicated with her daughters recently although she says her youngest daughter takes her to PCP appts and her oldest daughter takes her everywhere else she needs to go. She has been homeless for an unknown period of time and plans to go back to where her friends are located in [place]. Patient states she is able to shower herself and is getting around on her own. \" Im pretty much moving on up and can do everything on my own.\" Patient reports PT was working with her but services were discontinued because she didn't need them. EMR note indicates patient refused PT services. Patient states she is waiting on discharge papers at this time. When asked about experiencing hallucinations, she attributes them to the wrong antibiotic being prescribed to her and states she should have been put on Keflex. However, it is all straightened out now, and the infection is \"under control\". Patient states she was diagnosed with Schizophrenia about 5-6 yrs ago. She adamantly denies having AVH at this present time.. States \"that's been five years in the past and no longer bothers me.\" " "She recalls taking  Risperdal in the past and it was helpful at the time, but she does not need any medication at this time.In fact, she likes to minimize her medication use.  Patient states she is unsure if she has been diagnosed with other mental illness. EMR notes a history of anxiety and depression. Patient states she does not feel like anxiety or depression are a problem. She denies SI/HI. She states her level of agitation depends on the situation. EMR reflects patient received IM Geodon 20 mg 1/26/24 1118. She feels frustrated when she falls asleep and \"they come in and wake me up to do tests.\"  States that she saw  a therapist at [facility] [place],  and had a psych med provider 5 years ago, but she can't recall the name. Patient denies history of substance use.   1 PPD smoker since age 16Reports she has no immediate family diagnosed with any mental health disorders. Sleep is \"off and on\". States once she falls asleep she can stay asleep. Denies nightmares.     Diagnosis hx:Schizophrenia, anxiety, depression  Medications: Seroquel  Therapy: [facility] in past 5 yrs    Subjective      Review of Systems:   Review of Systems   Constitutional:  Positive for activity change.   HENT:  Positive for dental problem.    Cardiovascular:  Positive for leg swelling.   Skin:  Positive for wound.        BLE wounds   Psychiatric/Behavioral:  Positive for dysphoric mood and sleep disturbance. Negative for suicidal ideas.    All other systems reviewed and are negative.       Depression Screening:  PHQ-9: Brief Depression Severity Measure Score: 2     PHQ-9 Depression Screening  Little interest or pleasure in doing things? 1-->several days   Feeling down, depressed, or hopeless? 1-->several days   Trouble falling or staying asleep, or sleeping too much?     Feeling tired or having little energy?     Poor appetite or overeating?     Feeling bad about yourself - or that you are a failure or have let yourself or your family down?   "   Trouble concentrating on things, such as reading the newspaper or watching television?     Moving or speaking so slowly that other people could have noticed? Or the opposite - being so fidgety or restless that you have been moving around a lot more than usual?     Thoughts that you would be better off dead, or of hurting yourself in some way?     PHQ-9 Total Score 2   If you checked off any problems, how difficult have these problems made it for you to do your work, take care of things at home, or get along with other people?          Past Psychiatric History:   History of outpatient psychiatrist: several years ago. Unable to recall name  Diagnoses: Schizophrenia 5 yrs ago, anxiety, depression per EMR review  History of outpatient therapy: [place] 5 years ago  Previous Inpatient hospitalizations: denies  Previous medication trials: Risperdal, Seroquel, geodon IM during  hospitalization, ativan 4/23  History of suicide/self harm attempts: denies     Abuse/trauma History:              Physical: denies              Sexual: denies              Emotional/Neglect: denies              Significant death/loss: denies              Other trauma: denies              Head Injury:denies    Substance Abuse History/Last use:              Alcohol: denies              Tobacco/Vape: 1 PPD smoker since age 16              Illicit Drugs: denies              Seizures:denies              Caffeine: unknown    Legal History:     Work History:               Highest level of education obtained: unknown               History? no              Patient's Occupation: unemployed, Draws SSI    Interpersonal/Relational:              Marital Status:               Support system: friends and 2 daughters     Social History:  Where was patient born: [place] [facility]  Upbringing:   Where does patient currently live: [facility]  Living situation: homeless  Leisure and Recreation: watch TV  Rastafarian: Buddhism     Family History:   Family  "History   Problem Relation Age of Onset    Lymphoma Mother         mom passed at 82 with lymphoma    Heart attack Father         dad passed at 63 with a heart attack    No Known Problems Sister     No Known Problems Brother     No Known Problems Daughter        Family Psychiatric History:   Psych Diagnosis: pt denies  History of suicide/self harm attempts: pt denies  History of Substance abuse: pt denies      Past Medical History:   Past Medical History:   Diagnosis Date    Anxiety     Depression     Homelessness     Migraine     Obesity     Psoriasis         Past Surgical History:   Past Surgical History:   Procedure Laterality Date    NO PAST SURGERIES          Medications:   Scheduled Meds:cephalexin, 500 mg, Oral, Q6H  enoxaparin, 40 mg, Subcutaneous, Daily  mineral oil-hydrophilic petrolatum, 1 application , Topical, BID  mupirocin, 1 application , Topical, Q12H  nicotine, 1 patch, Transdermal, Q24H  senna-docusate sodium, 2 tablet, Oral, BID  sodium chloride, 10 mL, Intravenous, Q12H  triamcinolone, , Topical, Q12H      Continuous Infusions:   PRN Meds:.  senna-docusate sodium **AND** polyethylene glycol **AND** bisacodyl **AND** bisacodyl    Calcium Replacement - Follow Nurse / BPA Driven Protocol    diphenhydrAMINE-zinc acetate    hydrOXYzine    ibuprofen    Magnesium Standard Dose Replacement - Follow Nurse / BPA Driven Protocol    nitroglycerin    ondansetron ODT **OR** ondansetron    Phosphorus Replacement - Follow Nurse / BPA Driven Protocol    Potassium Replacement - Follow Nurse / BPA Driven Protocol    sodium chloride    sodium chloride    sodium chloride    ziprasidone     Medication Considerations:  CRISTIANO reviewed and appropriate.      Allergies:   Allergies   Allergen Reactions    Acetaminophen Other (See Comments)     \"Makes me sleep a lot\"          Objective     Physical Exam:  Temp:  [97.9 °F (36.6 °C)] 97.9 °F (36.6 °C)  Heart Rate:  [65-79] 79  Resp:  [16] 16  BP: (108-121)/(52-56) 109/56 "   Body mass index is 24.03 kg/m².     Mental Status Exam:   MENTAL STATUS EXAM   General Appearance:  Unkempt and other  Other Comment:  Wearing hosptital gown, poor dentition  Eye Contact:  Good eye contact  Attitude:  Guarded  Motor Activity:  Other  Other Comment:  JOCELYN pt sitting up in bed during visit  Muscle Strength:  Other  Other Comment:  JOCELYN  Speech:  Minimal spontaneity  Language:  Unspontaneous  Mood and affect:  Flat  Hopelessness:  Denies  Thought Process:  Logical and goal-directed  Associations/ Thought Content:  No delusions  Hallucinations:  None  Suicidal Ideations:  Not present  Homicidal Ideation:  Not present  Sensorium:  Alert and clear  Orientation:  Person, place, time and situation  Attention Span/ Concentration:  Good  Fund of Knowledge:  Appropriate for age and educational level  Intellectual Functioning:  Average range  Insight:  Limited  Judgement:  Limited  Reliability:  Fair  Impulse Control:  Fair       The following data was reviewed by: JENNA Singh on 01/24/2024:  CBC w/diff          1/24/2024    11:30 1/25/2024    04:31 1/27/2024    04:31   CBC w/Diff   WBC 10.99  8.38  7.65    RBC 4.07  3.23  3.43    Hemoglobin 11.7  9.5  9.9    Hematocrit 36.7  29.4  31.1    MCV 90.2  91.0  90.7    MCH 28.7  29.4  28.9    MCHC 31.9  32.3  31.8    RDW 13.0  13.2  12.9    Platelets 380  334  400    Neutrophil Rel % 81.7      Immature Granulocyte Rel % 0.5      Lymphocyte Rel % 7.1      Monocyte Rel % 8.0      Eosinophil Rel % 2.2      Basophil Rel % 0.5           Latest Reference Range & Units 01/27/24 04:31   Sodium 136 - 145 mmol/L 137   Potassium 3.5 - 5.2 mmol/L 4.5   Chloride 98 - 107 mmol/L 101   CO2 22.0 - 29.0 mmol/L 25.0   Anion Gap 5.0 - 15.0 mmol/L 11.0   BUN 6 - 20 mg/dL 14   Creatinine 0.57 - 1.00 mg/dL 0.86   BUN/Creatinine Ratio 7.0 - 25.0  16.3   eGFR >60.0 mL/min/1.73 77.9   Glucose 65 - 99 mg/dL 107 (H)   Calcium 8.6 - 10.5 mg/dL 8.5 (L)   Magnesium 1.6 - 2.6 mg/dL  1.9   (H): Data is abnormally high  (L): Data is abnormally low      Data reviewed : Cardiology studies EKG NSR Qtc 419   Assessment / Plan      Visit Diagnosis/Orders Placed This Visit:  History of Schizophrenia     Recommendations: follow up as outpatient to further assess need for medication mgt.   Would suggest Seroquel if medication is needed during her hospitalization    Impression/Formulation: Patient appears alert,oriented and able to provide some history.  She reports a history of Schizophrenia diagnosed 5 years ago but does not feel this is a problem at this time. She is not currently on any psychotropic medications and does not want any as she feels she is not experiencing symptom burden that would necessitate medication,  She denies AVH and denies feeling anxious or depressed. She denies SI/HI. Verbalizes frustrations about being hospitalized and is eager to be discharged.     Treatment and medication options discussed during today's visit.  Opportunity provided for any necessary clarification and patient questions.  Patient acknowledges and verbally consents to proceed with mutually agreed upon treatment plan. Patient is voluntarily requesting to begin outpatient treatment at [facility] [address].  Patient is receptive to assistance with maintaining a stable lifestyle.  Patient presents with history of  Schizophrenia.    .   Quality Measures:     TOBACCO USE:  Tobacco Use: High Risk (1/25/2024)    Patient History     Smoking Tobacco Use: Every Day     Smokeless Tobacco Use: Never     Passive Exposure: Not on file      Current every day smoker less than 3 minutes spent counseling Not agreeable to stopping    I advised Keyla of the risks of tobacco use.     Follow Up:   No follow-ups on file.     Hyun Vo PMHNP-BC

## 2024-01-30 NOTE — CASE MANAGEMENT/SOCIAL WORK
Continued Stay Note  [facility]     Patient Name: Keyla Strong  MRN: [id number]  Today's Date: 1/30/2024    Admit Date: 1/24/2024    Plan: [facility]   Discharge Plan       Row Name 01/30/24 1822       Plan    Plan [facility]    Plan Comments MSW spoke to RN about d/c planning for pt. upon her d/c. Pt. requesting to go to [facility]. [facility] has a temporary [place] shelter at [facility] that accepts men and women on a walk-in basis 24/7. Pt. will need a Lyft to [facility]. MSW to arrange Lyft, if MSW has completed her shift RN to call  ([phone]). RN to call once transport is on floor, then Lyft can be scheduled. MSW is available.    Final Discharge Disposition Code 01 - home or self-care                     Discharge Codes    No documentation.                 Expected Discharge Date and Time       Expected Discharge Date Expected Discharge Time    Jan 30, 2024               RENA Wallis

## 2024-01-30 NOTE — CASE MANAGEMENT/SOCIAL WORK
Continued Stay Note  [facility]     Patient Name: Keyla Strong  MRN: [id number]  Today's Date: 1/30/2024    Admit Date: 1/24/2024    Plan: TBD   Discharge Plan       Row Name 01/30/24 1442       Plan    Plan TBD    Plan Comments Met with patient at the bedside to discuss discharge plan. Per PT note, patient refused to work with PT today. Patient reports she does not want rehab at discharge and wants to get out of the hospital. Patient is unable to go to local shelters due to past problems, and  has not been able to get in touch with family. Patient has a psych evaluation at 1600 today.  will continue to follow plan of care and assist with discharge planning needs as indicated.    Final Discharge Disposition Code 30 - still a patient                   Discharge Codes    No documentation.                 Expected Discharge Date and Time       Expected Discharge Date Expected Discharge Time    Jan 30, 2024               Lupe Gomez RN

## 2024-01-30 NOTE — PROGRESS NOTES
Psychiatric Medicine Services  PROGRESS NOTE    Patient Name: Keyla Strong  : 1965  MRN: [id number]    Date of Admission: 2024  Primary Care Physician: Janessa Mccall APRN    Subjective   Subjective     CC:  F/u BLE pain    HPI:  Patient up ad maki. in the room.  Says she is feeling better.  Says she is itching less on the Keflex.  Denies concerns.    Objective   Objective     Vital Signs:   Temp:  [97.9 °F (36.6 °C)-98.1 °F (36.7 °C)] 97.9 °F (36.6 °C)  Heart Rate:  [65-82] 70  Resp:  [16-18] 16  BP: (108-121)/(52-55) 108/54     Physical Exam:  Constitutional: No acute distress, awake, alert  HENT: NCAT, mucous membranes moist  Respiratory: Clear to auscultation bilaterally, respiratory effort normal, room air  Cardiovascular: RRR, no murmurs, rubs, or gallops  Gastrointestinal: Positive bowel sounds, soft, nontender, nondistended  Musculoskeletal: No bilateral ankle edema  Psychiatric: Appropriate affect, cooperative  Neurologic: Oriented x 3, moves all extremities, Cranial Nerves grossly intact to confrontation, speech clear  Skin: Rash BLE and BUE      Results Reviewed:  LAB RESULTS:      Lab 24  1130 24   WBC 7.65 8.38 10.99* 14.19*   HEMOGLOBIN 9.9* 9.5* 11.7* 10.8*   HEMATOCRIT 31.1* 29.4* 36.7 32.8*   PLATELETS 400 334 380 345   NEUTROS ABS  --   --  8.97* 11.77*   IMMATURE GRANS (ABS)  --   --  0.06* 0.05   LYMPHS ABS  --   --  0.78 1.01*   MONOS ABS  --   --  0.88 1.09*   EOS ABS  --   --  0.24 0.22   MCV 90.7 91.0 90.2 89         Lab 24  0431 24  1617 24  04324  1130   SODIUM 137  --  137 138   POTASSIUM 4.5 4.9 3.3* 3.7   CHLORIDE 101  --  101 100   CO2 25.0  --  25.0 26.0   ANION GAP 11.0  --  11.0 12.0   BUN 14  --  16 19   CREATININE 0.86  --  0.74 0.67   EGFR 77.9  --  93.3 100.8   GLUCOSE 107*  --  107* 116*   CALCIUM 8.5*  --  8.1* 9.0   MAGNESIUM 1.9  --   --   --          Lab  01/25/24  0431 01/24/24  1130   TOTAL PROTEIN 5.7* 7.4   ALBUMIN 2.9* 3.7   GLOBULIN 2.8 3.7   ALT (SGPT) 9 11   AST (SGOT) 15 16   BILIRUBIN 0.2 0.2   ALK PHOS 76 94                     Brief Urine Lab Results  (Last result in the past 365 days)        Color   Clarity   Blood   Leuk Est   Nitrite   Protein   CREAT   Urine HCG        12/16/23 1336 Yellow   Clear   Negative   Negative   Negative   Negative                   Microbiology Results Abnormal       None            No radiology results from the last 24 hrs        Current medications:  Scheduled Meds:cephalexin, 500 mg, Oral, Q6H  enoxaparin, 40 mg, Subcutaneous, Daily  mineral oil-hydrophilic petrolatum, 1 application , Topical, BID  mupirocin, 1 application , Topical, Q12H  nicotine, 1 patch, Transdermal, Q24H  senna-docusate sodium, 2 tablet, Oral, BID  sodium chloride, 10 mL, Intravenous, Q12H  triamcinolone, , Topical, Q12H      Continuous Infusions:   PRN Meds:.  senna-docusate sodium **AND** polyethylene glycol **AND** bisacodyl **AND** bisacodyl    Calcium Replacement - Follow Nurse / BPA Driven Protocol    diphenhydrAMINE-zinc acetate    hydrOXYzine    ibuprofen    Magnesium Standard Dose Replacement - Follow Nurse / BPA Driven Protocol    nitroglycerin    ondansetron ODT **OR** ondansetron    Phosphorus Replacement - Follow Nurse / BPA Driven Protocol    Potassium Replacement - Follow Nurse / BPA Driven Protocol    sodium chloride    sodium chloride    sodium chloride    ziprasidone    Assessment & Plan   Assessment & Plan     Active Hospital Problems    Diagnosis  POA    **Leg pain [M79.606]  Yes    Psoriasis [L40.9]  Yes    Homeless [Z59.00]  Not Applicable    Anxiety associated with depression [F41.8]  Yes    Ecthyma [L08.0]  Yes      Resolved Hospital Problems   No resolved problems to display.        Brief Hospital Course to date:  Keyla Strong is a 59 y.o. female  with a past medical history of schizophrenia, psoriasis, and current  "homelessness who presented to [facility] ED complaining of bilateral leg pain. She had been seen at  the day prior, was prescribed ATBx and sent home but did not fill her prescription as her pain was too bad, so she presented to [facility].     This patient's problems and plans were partially entered by my partner and updated as appropriate by me 01/30/24.       Psoriasis vs Ecthyma  Bilateral Leg Pain  --Could be Staph or Strep, started on bactrim plus augmentin for coverage, WBCs now wnl  --WOC following for dressing changes, change q3days; also treating psoriasis with Aquaphor, steroids  --Bilateral tib/fib x-ray: Apparent soft tissue irregularity at the medial aspect of the high left ankle. Suspected generalized subcutaneous edema bilaterally. No radiopaque foreign body.   --PT/OT following  --change abx to Keflex x 7 days, add triamcinolone lotion     Hypokalemia  --Replace per protocol     Homelessness  --CM in ED tried to get patient discharged to homeless shelter from ED, but no shelter would take her due to hx of \"prior actions\"  --here awaiting safe discharge plan arrangements; social work/case management following     Anxiety with Depression  Schizophrenia   --Not on any home meds  --on 1/26 was having hallucinations and became combative, s/p IM geodon on 1/26 and calmer  --psych consult pending for Tuesday, 1/30, at 4pm     Tobacco abuse  --Nicotine patch    Expected Discharge Location and Transportation: SNF  Expected Discharge pending bed offer, insurance approval  Expected Discharge Date: 1/30/2024; Expected Discharge Time:      DVT prophylaxis:  Medical DVT prophylaxis orders are present.         AM-PAC 6 Clicks Score (PT): 24 (01/30/24 5036)    CODE STATUS:   Code Status and Medical Interventions:   Ordered at: 01/24/24 2695     Level Of Support Discussed With:    Patient     Code Status (Patient has no pulse and is not breathing):    CPR (Attempt to Resuscitate)     Medical Interventions (Patient has pulse " or is breathing):    Full Support       Stacey Rojo, APRN  01/30/24

## 2024-01-30 NOTE — PROGRESS NOTES
Continued Stay Note  [facility]     Patient Name: Keyla Strong  MRN: [id number]  Today's Date: 1/30/2024    Admit Date: 1/24/2024    Plan: TBD   Discharge Plan       Row Name 01/30/24 7505       Plan    Plan Comments The telepsych assessment is being completed and social work is following the patient      Row Name 01/30/24 1442       Plan    Plan TBD    Plan Comments Met with patient at the bedside to discuss discharge plan. Per PT note, patient refused to work with PT today. Patient reports she does not want rehab at discharge and wants to get out of the hospital. Patient is unable to go to local shelters due to past problems, and  has not been able to get in touch with family. Patient has a psych evaluation at 1600 today.  will continue to follow plan of care and assist with discharge planning needs as indicated.    Final Discharge Disposition Code 30 - still a patient                   Discharge Codes    No documentation.                 Expected Discharge Date and Time       Expected Discharge Date Expected Discharge Time    Jan 30, 2024               RENA Son

## 2024-01-30 NOTE — THERAPY DISCHARGE NOTE
Patient Name: Keyla Strong  : 1965    MRN: [id number]                              Today's Date: 2024       Admit Date: 2024    Visit Dx:     ICD-10-CM ICD-9-CM   1. Bilateral lower leg cellulitis  L03.116 682.6    L03.115    2. Homelessness  Z59.00 V60.0   3. Impaired mobility  Z74.09 799.89   4. Pain in both lower extremities  M79.604 729.5    M79.605      Patient Active Problem List   Diagnosis    Well woman exam    Leg pain    Psoriasis    Homeless    Anxiety associated with depression    Ecthyma     Past Medical History:   Diagnosis Date    Anxiety     Depression     Homelessness     Migraine     Obesity     Psoriasis      Past Surgical History:   Procedure Laterality Date    NO PAST SURGERIES        General Information    No documentation.                  Mobility    No documentation.                  Obj/Interventions    No documentation.                  Goals/Plan       Row Name 24 1404          Bed Mobility Goal 1 (PT)    Progress/Outcomes (Bed Mobility Goal 1, PT) goal not met  -       Row Name 24 1404          Transfer Goal 1 (PT)    Progress/Outcome (Transfer Goal 1, PT) goal not met  -       Row Name 24 1404          Gait Training Goal 1 (PT)    Progress/Outcome (Gait Training Goal 1, PT) goal not met  -               User Key  (r) = Recorded By, (t) = Taken By, (c) = Cosigned By      Initials Name Provider Type     Kyra Villanueva, PT Physical Therapist                   Clinical Impression       Row Name 24 1402          Plan of Care Review    Outcome Evaluation Pt. declined session and requested to be discharge skilled PT services at this time. Educated pt. to speak w/nursing staff or physician if she changes her mind or has a decline in functional mobility.  -       Row Name 24 1402          Therapy Assessment/Plan (PT)    Criteria for Skilled Interventions Met (PT) patient/family refuse skilled intervention at this time  -                User Key  (r) = Recorded By, (t) = Taken By, (c) = Cosigned By      Initials Name Provider Type     Kyra Villanueva, PT Physical Therapist                   Outcome Measures       Row Name 01/30/24 0916          How much help from another person do you currently need...    Turning from your back to your side while in flat bed without using bedrails? 4  -DV     Moving from lying on back to sitting on the side of a flat bed without bedrails? 4  -DV     Moving to and from a bed to a chair (including a wheelchair)? 4  -DV     Standing up from a chair using your arms (e.g., wheelchair, bedside chair)? 4  -DV     Climbing 3-5 steps with a railing? 4  -DV     To walk in hospital room? 4  -DV     AM-PAC 6 Clicks Score (PT) 24  -DV     Highest Level of Mobility Goal 8 --> Walked 250 feet or more  -DV               User Key  (r) = Recorded By, (t) = Taken By, (c) = Cosigned By      Initials Name Provider Type    Kaylyn Villalobos RN Registered Nurse                  Physical Therapy Education       Title: PT OT SLP Therapies (In Progress)       Topic: Physical Therapy (In Progress)       Point: Mobility training (In Progress)       Learning Progress Summary             Patient Acceptance, E, NR by AE at 1/25/2024 0926                         Point: Home exercise program (Not Started)       Learner Progress:  Not documented in this visit.              Point: Body mechanics (In Progress)       Learning Progress Summary             Patient Acceptance, E, NR by AE at 1/25/2024 0926                         Point: Precautions (In Progress)       Learning Progress Summary             Patient Acceptance, E, NR by AE at 1/25/2024 0926                                         User Key       Initials Effective Dates Name Provider Type Discipline    AE 09/21/21 -  Benson Gunn, PT Physical Therapist PT                  PT Recommendation and Plan     Outcome Evaluation: Pt. declined session and requested to be discharge skilled PT  services at this time. Educated pt. to speak w/nursing staff or physician if she changes her mind or has a decline in functional mobility.     Time Calculation:              PT G-Codes  Outcome Measure Options: AM-PAC 6 Clicks Daily Activity (OT)  AM-PAC 6 Clicks Score (PT): 24  AM-PAC 6 Clicks Score (OT): 24    PT Discharge Summary  Reason for Discharge: Patient/Caregiver request  Outcomes Achieved: Unable to make functional progress toward goals at this time  Discharge Destination: other (comment) (complex discharge)    Kyra Villanueva, PT  1/30/2024

## 2024-01-30 NOTE — DISCHARGE SUMMARY
[facility] Medicine Services  DISCHARGE SUMMARY    Patient Name: Keyla Strong  : 1965  MRN: [id number]    Date of Admission: 2024 11:15 AM  Date of Discharge:  2024  Primary Care Physician: Janessa Mccall APRN    Consults       Date and Time Order Name Status Description    2024 10:06 AM Inpatient Psychiatrist Consult              Hospital Course     Presenting Problem: Leg pain    Active Hospital Problems    Diagnosis  POA    **Leg pain [M79.606]  Yes    Psoriasis [L40.9]  Yes    Homeless [Z59.00]  Not Applicable    Anxiety associated with depression [F41.8]  Yes    Ecthyma [L08.0]  Yes      Resolved Hospital Problems   No resolved problems to display.          Hospital Course:  Keyla Strong is a 59 y.o. female with a past medical history of schizophrenia, psoriasis, and current homelessness who presented to [facility] ED complaining of bilateral leg pain. She had been seen at  the day prior, was prescribed ATBx and sent home but did not fill her prescription as her pain was too bad, so she then presented to [facility]. Patient had elevated WBCs on admission and was started on antibiotics. Wound care was consulted for wound management. Patient was evaluated by psychiatry on  and was deemed competent to make decisions about her medical care. She adamantly insists on discharging tonight in spite of attempts to convince her to stay until tomorrow so that further arrangements can be made. She states she will  her medications at [facility] on [address] [address]. She states she will return to the [facility] where her belongings are and that she was in contact with Pino at the facility yesterday about returning.  will arrange transportation to the shelter.       Psoriasis vs Ecthyma  Bilateral Leg Pain  --Could be Staph or Strep, started on bactrim plus augmentin for coverage, WBCs now wnl  --Change abx to Keflex x 7 days, add triamcinolone lotion  --Dressing  changes q3days, instructions in AVS, supplies sent with patient  --Bilateral tib/fib x-ray: Apparent soft tissue irregularity at the medial aspect of the high left ankle. Suspected generalized subcutaneous edema bilaterally. No radiopaque foreign body.      Homelessness  Anxiety with Depression  Schizophrenia   --Not on any home meds  --Ambulatory referral for outpatient psych.       Discharge Follow Up Recommendations for outpatient labs/diagnostics:  --Follow up with PCP one week, preferably Friday, 2/2, if possible. Patient to call and make appointment.  --Referral to outpatient psych    Day of Discharge     HPI:   Patient up ad maki. in the room.  Says she is feeling better.  Says she is itching less on the Keflex.  Denies concerns.     Review of Systems  Gen- No fevers, chills  CV- No chest pain, palpitations  Resp- No cough, dyspnea  GI- No N/V/D, abd pain      Vital Signs:   Temp:  [97.9 °F (36.6 °C)] 97.9 °F (36.6 °C)  Heart Rate:  [70-79] 79  Resp:  [16] 16  BP: (108-121)/(52-56) 109/56      Physical Exam:  Constitutional: No acute distress, awake, alert  HENT: NCAT, mucous membranes moist  Respiratory: Clear to auscultation bilaterally, respiratory effort normal, room air  Cardiovascular: RRR, no murmurs, rubs, or gallops  Gastrointestinal: Positive bowel sounds, soft, nontender, nondistended  Musculoskeletal: No bilateral ankle edema  Psychiatric: Appropriate affect, cooperative  Neurologic: Oriented x 3, moves all extremities, Cranial Nerves grossly intact to confrontation, speech clear  Skin: Rash BLE and BUE       Pertinent  and/or Most Recent Results     LAB RESULTS:      Lab 01/27/24  0431 01/25/24  0431 01/24/24  1130 01/23/24  2035   WBC 7.65 8.38 10.99* 14.19*   HEMOGLOBIN 9.9* 9.5* 11.7* 10.8*   HEMATOCRIT 31.1* 29.4* 36.7 32.8*   PLATELETS 400 334 380 345   NEUTROS ABS  --   --  8.97* 11.77*   IMMATURE GRANS (ABS)  --   --  0.06* 0.05   LYMPHS ABS  --   --  0.78 1.01*   MONOS ABS  --   --  0.88  1.09*   EOS ABS  --   --  0.24 0.22   MCV 90.7 91.0 90.2 89         Lab 01/27/24  0431 01/25/24  1617 01/25/24  0431 01/24/24  1130   SODIUM 137  --  137 138   POTASSIUM 4.5 4.9 3.3* 3.7   CHLORIDE 101  --  101 100   CO2 25.0  --  25.0 26.0   ANION GAP 11.0  --  11.0 12.0   BUN 14  --  16 19   CREATININE 0.86  --  0.74 0.67   EGFR 77.9  --  93.3 100.8   GLUCOSE 107*  --  107* 116*   CALCIUM 8.5*  --  8.1* 9.0   MAGNESIUM 1.9  --   --   --          Lab 01/25/24  0431 01/24/24  1130   TOTAL PROTEIN 5.7* 7.4   ALBUMIN 2.9* 3.7   GLOBULIN 2.8 3.7   ALT (SGPT) 9 11   AST (SGOT) 15 16   BILIRUBIN 0.2 0.2   ALK PHOS 76 94                     Brief Urine Lab Results  (Last result in the past 365 days)        Color   Clarity   Blood   Leuk Est   Nitrite   Protein   CREAT   Urine HCG        12/16/23 1336 Yellow   Clear   Negative   Negative   Negative   Negative                 Microbiology Results (last 10 days)       Procedure Component Value - Date/Time    COVID-19, FLU A/B, RSV PCR 1 HR TAT - , [[id number]]  (Normal) Collected: 01/23/24 2044    Lab Status: Final result Specimen: Swab from Nasopharynx Updated: 01/24/24 1115     SARS-CoV-2, KARLA Not Detected     Influenza A PCR Not Detected     Influenza B PCR Not Detected     RSV, PCR Not Detected    Narrative:      This assay is for in vitro diagnostic use under FDA emergency use authorization only. Negative results do not preclude infection with the SARS CoV-2 virus and should not be the sole basis of a patient treatment/management or public health decision. Follow up testing should be performed according to the current CDC recommendations.  This test was performed on the Xpert Xpress SARS CoV-2 Plus assay test, a PCR-based method.  Negative results should be considered presumptive and do not preclude current or future infection obtained through community transmission or other exposures. Negative results must be considered in the context of an individual's recent  exposures, history, presence of clinical signs and symptoms consistent with COVID-19.            XR Tibia Fibula 2 View Bilateral    Result Date: 1/24/2024  XR TIBIA FIBULA 2 VW BILATERAL Date of Exam: 1/24/2024 12:14 PM EST Indication: bilateral lower leg pain Comparison: None available. Findings: No evidence of acute osseous abnormality. No visualized joint malalignment. No radiopaque foreign body. There may be generalized subcutaneous edema. Asymmetric decreased muscle bulk on the left compared to the right. Apparent soft tissue irregularity at the medial aspect of the high left ankle.     Impression: No evidence of acute osseous abnormality. Apparent soft tissue irregularity at the medial aspect of the high left ankle. Suspected generalized subcutaneous edema bilaterally. No radiopaque foreign body. Recommend correlation with exam. Electronically Signed: Brock Payne MD  1/24/2024 12:44 PM EST  Workstation ID: [id number]    XR Foot 2 View Bilateral    Result Date: 1/24/2024  XR FOOT 2 VW BILATERAL Date of Exam: 1/24/2024 12:14 PM EST Indication: bilateral foot pain Comparison: 8/14/2021 radiographs Findings: There is no evidence of fracture. Bilateral type I accessory navicular bones. Plantar and dorsal calcaneal enthesopathy with fragmentation on the right. Normal joint alignment. There is cutaneous irregularity along the medial aspect of the left lower leg, partially visualized..     Impression: No evidence of fracture or acute osseous abnormality. Cutaneous irregularity along the medial aspect of the left lower leg, which is partially visualized. Electronically Signed: Alex Whitney MD  1/24/2024 12:42 PM EST  Workstation ID: [id number]                 Plan for Follow-up of Pending Labs/Results:     Discharge Details        Discharge Medications        New Medications        Instructions Start Date   cephalexin 500 MG capsule  Commonly known as: KEFLEX   500 mg, Oral, Every 6 Hours Scheduled      mupirocin 2 %  "ointment  Commonly known as: BACTROBAN   1 Application, Topical, Every 12 Hours Scheduled      triamcinolone 0.1 % lotion  Commonly known as: KENALOG   Topical, Every 12 Hours Scheduled               Allergies   Allergen Reactions    Acetaminophen Other (See Comments)     \"Makes me sleep a lot\"         Discharge Disposition:  Home or Self Care    Diet:  Hospital:  Diet Order   Procedures    Diet: Regular/House Diet; Texture: Regular Texture (IDDSI 7); Fluid Consistency: Thin (IDDSI 0)            Activity:  Activity Instructions       Activity as Tolerated              Restrictions or Other Recommendations:         CODE STATUS:    Code Status and Medical Interventions:   Ordered at: 01/24/24 3832     Level Of Support Discussed With:    Patient     Code Status (Patient has no pulse and is not breathing):    CPR (Attempt to Resuscitate)     Medical Interventions (Patient has pulse or is breathing):    Full Support       No future appointments.    Additional Instructions for the Follow-ups that You Need to Schedule       Discharge Follow-up with PCP   As directed       Currently Documented PCP:    Janessa Mccall APRN    PCP Phone Number:    [phone]     Follow Up Details: One week                  JENNA Wisdom  01/30/24      Time Spent on Discharge:  I spent  45  minutes on this discharge activity which included: face-to-face encounter with the patient, reviewing the data in the system, coordination of the care with the nursing staff as well as consultants, documentation, and entering orders.          "

## 2024-01-31 ENCOUNTER — TRANSITIONAL CARE MANAGEMENT TELEPHONE ENCOUNTER (OUTPATIENT)
Dept: CALL CENTER | Facility: HOSPITAL | Age: 59
End: 2024-01-31
Payer: MEDICAID

## 2024-01-31 ENCOUNTER — HOSPITAL ENCOUNTER (EMERGENCY)
Facility: HOSPITAL | Age: 59
Discharge: HOME | End: 2024-02-01
Attending: EMERGENCY MEDICINE | Admitting: EMERGENCY MEDICINE
Payer: MEDICAID

## 2024-01-31 VITALS
TEMPERATURE: 97.8 F | DIASTOLIC BLOOD PRESSURE: 66 MMHG | SYSTOLIC BLOOD PRESSURE: 132 MMHG | RESPIRATION RATE: 14 BRPM | OXYGEN SATURATION: 99 % | WEIGHT: 130 LBS | HEART RATE: 71 BPM | BODY MASS INDEX: 19.7 KG/M2 | HEIGHT: 68 IN

## 2024-01-31 DIAGNOSIS — Z91.199 HISTORY OF NONCOMPLIANCE WITH MEDICAL TREATMENT: ICD-10-CM

## 2024-01-31 DIAGNOSIS — Z87.2 HISTORY OF PSORIASIS: ICD-10-CM

## 2024-01-31 DIAGNOSIS — Z87.2 HISTORY OF CELLULITIS: ICD-10-CM

## 2024-01-31 DIAGNOSIS — M79.604 BILATERAL LEG PAIN: Primary | ICD-10-CM

## 2024-01-31 DIAGNOSIS — Z59.00 HOMELESSNESS: ICD-10-CM

## 2024-01-31 DIAGNOSIS — M79.605 BILATERAL LEG PAIN: Primary | ICD-10-CM

## 2024-01-31 DIAGNOSIS — F41.9 ANXIETY AND DEPRESSION: ICD-10-CM

## 2024-01-31 DIAGNOSIS — Z86.59 HISTORY OF SCHIZOPHRENIA: ICD-10-CM

## 2024-01-31 DIAGNOSIS — F32.A ANXIETY AND DEPRESSION: ICD-10-CM

## 2024-01-31 LAB
ALBUMIN SERPL-MCNC: 3.7 G/DL (ref 3.5–5.2)
ALBUMIN/GLOB SERPL: 1 G/DL
ALP SERPL-CCNC: 89 U/L (ref 39–117)
ALT SERPL W P-5'-P-CCNC: 11 U/L (ref 1–33)
ANION GAP SERPL CALCULATED.3IONS-SCNC: 10 MMOL/L (ref 5–15)
AST SERPL-CCNC: 17 U/L (ref 1–32)
BASOPHILS # BLD AUTO: 0.1 10*3/MM3 (ref 0–0.2)
BASOPHILS NFR BLD AUTO: 0.9 % (ref 0–1.5)
BILIRUB SERPL-MCNC: <0.2 MG/DL (ref 0–1.2)
BUN SERPL-MCNC: 20 MG/DL (ref 6–20)
BUN/CREAT SERPL: 29 (ref 7–25)
CALCIUM SPEC-SCNC: 8.8 MG/DL (ref 8.6–10.5)
CHLORIDE SERPL-SCNC: 104 MMOL/L (ref 98–107)
CO2 SERPL-SCNC: 27 MMOL/L (ref 22–29)
CREAT SERPL-MCNC: 0.69 MG/DL (ref 0.57–1)
CRP SERPL-MCNC: 0.43 MG/DL (ref 0–0.5)
D-LACTATE SERPL-SCNC: 1 MMOL/L (ref 0.5–2)
DEPRECATED RDW RBC AUTO: 44.6 FL (ref 37–54)
EGFRCR SERPLBLD CKD-EPI 2021: 100.1 ML/MIN/1.73
EOSINOPHIL # BLD AUTO: 0.49 10*3/MM3 (ref 0–0.4)
EOSINOPHIL NFR BLD AUTO: 4.3 % (ref 0.3–6.2)
ERYTHROCYTE [DISTWIDTH] IN BLOOD BY AUTOMATED COUNT: 13.1 % (ref 12.3–15.4)
ERYTHROCYTE [SEDIMENTATION RATE] IN BLOOD: 64 MM/HR (ref 0–30)
GLOBULIN UR ELPH-MCNC: 3.6 GM/DL
GLUCOSE SERPL-MCNC: 105 MG/DL (ref 65–99)
HCT VFR BLD AUTO: 37.5 % (ref 34–46.6)
HGB BLD-MCNC: 11.9 G/DL (ref 12–15.9)
IMM GRANULOCYTES # BLD AUTO: 0.11 10*3/MM3 (ref 0–0.05)
IMM GRANULOCYTES NFR BLD AUTO: 1 % (ref 0–0.5)
LYMPHOCYTES # BLD AUTO: 1.24 10*3/MM3 (ref 0.7–3.1)
LYMPHOCYTES NFR BLD AUTO: 11 % (ref 19.6–45.3)
MCH RBC QN AUTO: 29.6 PG (ref 26.6–33)
MCHC RBC AUTO-ENTMCNC: 31.7 G/DL (ref 31.5–35.7)
MCV RBC AUTO: 93.3 FL (ref 79–97)
MONOCYTES # BLD AUTO: 0.9 10*3/MM3 (ref 0.1–0.9)
MONOCYTES NFR BLD AUTO: 8 % (ref 5–12)
NEUTROPHILS NFR BLD AUTO: 74.8 % (ref 42.7–76)
NEUTROPHILS NFR BLD AUTO: 8.48 10*3/MM3 (ref 1.7–7)
NRBC BLD AUTO-RTO: 0 /100 WBC (ref 0–0.2)
PLATELET # BLD AUTO: 558 10*3/MM3 (ref 140–450)
PMV BLD AUTO: 9.2 FL (ref 6–12)
POTASSIUM SERPL-SCNC: 4.2 MMOL/L (ref 3.5–5.2)
PROCALCITONIN SERPL-MCNC: 0.03 NG/ML (ref 0–0.25)
PROT SERPL-MCNC: 7.3 G/DL (ref 6–8.5)
RBC # BLD AUTO: 4.02 10*6/MM3 (ref 3.77–5.28)
SODIUM SERPL-SCNC: 141 MMOL/L (ref 136–145)
WBC NRBC COR # BLD AUTO: 11.32 10*3/MM3 (ref 3.4–10.8)

## 2024-01-31 PROCEDURE — 84145 PROCALCITONIN (PCT): CPT | Performed by: EMERGENCY MEDICINE

## 2024-01-31 PROCEDURE — 86140 C-REACTIVE PROTEIN: CPT | Performed by: EMERGENCY MEDICINE

## 2024-01-31 PROCEDURE — 83605 ASSAY OF LACTIC ACID: CPT | Performed by: EMERGENCY MEDICINE

## 2024-01-31 PROCEDURE — 87040 BLOOD CULTURE FOR BACTERIA: CPT | Performed by: EMERGENCY MEDICINE

## 2024-01-31 PROCEDURE — 85025 COMPLETE CBC W/AUTO DIFF WBC: CPT | Performed by: EMERGENCY MEDICINE

## 2024-01-31 PROCEDURE — 80053 COMPREHEN METABOLIC PANEL: CPT | Performed by: EMERGENCY MEDICINE

## 2024-01-31 PROCEDURE — 87641 MR-STAPH DNA AMP PROBE: CPT | Performed by: PHYSICIAN ASSISTANT

## 2024-01-31 PROCEDURE — 99283 EMERGENCY DEPT VISIT LOW MDM: CPT

## 2024-01-31 PROCEDURE — 85652 RBC SED RATE AUTOMATED: CPT | Performed by: EMERGENCY MEDICINE

## 2024-01-31 PROCEDURE — 36415 COLL VENOUS BLD VENIPUNCTURE: CPT

## 2024-01-31 PROCEDURE — 93005 ELECTROCARDIOGRAM TRACING: CPT | Performed by: PHYSICIAN ASSISTANT

## 2024-01-31 RX ORDER — SODIUM CHLORIDE 0.9 % (FLUSH) 0.9 %
10 SYRINGE (ML) INJECTION AS NEEDED
Status: DISCONTINUED | OUTPATIENT
Start: 2024-01-31 | End: 2024-02-01 | Stop reason: HOSPADM

## 2024-01-31 SDOH — ECONOMIC STABILITY - HOUSING INSECURITY: HOMELESSNESS UNSPECIFIED: Z59.00

## 2024-01-31 NOTE — OUTREACH NOTE
Prep Survey      Flowsheet Row Responses   [facility] patient discharged from? [facility]   Is LACE score < 7 ? No   Eligibility [facility]   Date of Admission 01/24/24   Date of Discharge 01/30/24   Discharge Disposition Home or Self Care   Discharge diagnosis *Leg pain   Does the patient have one of the following disease processes/diagnoses(primary or secondary)? Other   Does the patient have Home health ordered? No   Is there a DME ordered? No   General alerts for this patient [facility]   Prep survey completed? Yes            FRANNY WALKER - Registered Nurse

## 2024-01-31 NOTE — OUTREACH NOTE
Call Center TCM Note      Flowsheet Row Responses   [facility] [place] patient discharged from? [facility]   Does the patient have one of the following disease processes/diagnoses(primary or secondary)? Other   TCM attempt successful? No   Unsuccessful attempts Attempt 1   Revoked Reason Phone Issues  [Patient does not have a phone-no valid BIBI for PCP office.]            Oneida Barba RN    1/31/2024, 09:48 EST

## 2024-02-01 LAB — MRSA DNA SPEC QL NAA+PROBE: NORMAL

## 2024-02-01 NOTE — DISCHARGE INSTRUCTIONS
ER workup was reassuring.  CBC and chemistries were stable and lactic acid and CRP were normal.  Sed rate was mildly elevated.  Exam reveals dry skin with underlying psoriasis but patient's recent cellulitis has significantly improved.  Continue treatment with Keflex and topical triamcinolone lotion as prescribed on hospital discharge on 1/29/2024.  Continue with all other current medical management.  Recommend close PCP follow-up for recheck.  Return to the ER if worsening symptoms.

## 2024-02-01 NOTE — ED PROVIDER NOTES
Subjective   History of Present Illness  Patient tells me that she has had bilateral leg pain with open wounds with underlying psoriasis and recent admission for cellulitis for the past week.  She was recently admitted to our facility for the above symptoms.  After reviewing the recent hospital course, patient was admitted on 1/24/2024 through 1/29/2024 for leg pain, psoriasis, and homelessness.  Patient presented at that time complaining of leg pain.  She had been seen at  ER the day prior on 1/23/2024 and was prescribed antibiotics and sent home but she did not fill her prescriptions.  She came back to our ER the following day and white blood cell count was elevated on admission and she was started on antibiotics.  Wound care was consulted for wound management.  Patient was also evaluated by psychiatry on 1/30/2024 and was deemed competent to make decisions about her medical care.  She adamantly insisted on discharging herself on 1/29/2024 in spite of attempts to convince her to stay so that further arrangements could be made.  She said that she would return to the [facility] where her belongings were.  Patient was initially started on Bactrim plus Augmentin for coverage.  She was discharged with Keflex for total of 7 days and also given triamcinolone lotion.  Dressing changes were performed every 3 days and supplies were sent with patient.  Bilateral tibia/fibula x-rays revealed soft tissue irregularity at the medial aspect of the high left ankle and there was suspected subcutaneous edema bilaterally but no foreign body.  Patient does have history of anxiety/depression, schizophrenia, and homelessness and does not take any routine daily medications.  She also reports heavy tobacco use.  Patient presented today and said that she realizes she probably should not have left the hospital and feels like her legs need more treatment.  She denies any fever or chills.  She has dressings in place to both legs.  No other  concerns at this time.    History provided by:  Patient  Leg Pain  Location:  Leg  Time since incident:  1 week  Leg location:  L lower leg and R lower leg  Pain details:     Duration:  1 week    Timing:  Constant    Progression:  Unchanged  Chronicity:  Recurrent  Prior injury to area:  No  Relieved by:  Nothing  Worsened by:  Nothing  Ineffective treatments: Patient admits to me that she is not taking antibiotics as prescribed from recent hospital discharge on 1/29/2024.  Associated symptoms: no back pain, no fatigue, no fever, no numbness, no swelling and no tingling    Risk factors comment:  Medical non-compliance; homelessness      Review of Systems   Constitutional: Negative.  Negative for activity change, appetite change, chills, diaphoresis, fatigue and fever.   HENT: Negative.  Negative for congestion, ear pain, postnasal drip, rhinorrhea, sinus pressure, sinus pain, sneezing and sore throat.    Respiratory: Negative.  Negative for shortness of breath.         Positive for tobacco dependence   Cardiovascular: Negative.    Gastrointestinal: Negative.  Negative for abdominal pain, constipation, diarrhea, nausea and vomiting.   Genitourinary: Negative.    Musculoskeletal:  Positive for myalgias (Bilateral lower extremity pain with recent hospitalization for cellulitis). Negative for back pain and gait problem.   Skin:  Positive for color change and wound (Multiple open wounds to bilateral lower extremities; superficial and scabs noted.).        History of psoriasis with multiple open wounds to bilateral lower extremities with underlying psoriasis.  Recent hospital admission from 1/24/2024 through 1/29/2024 where patient left AGAINST MEDICAL ADVICE.   Neurological: Negative.  Negative for dizziness, syncope, light-headedness and headaches.   Psychiatric/Behavioral:  Positive for behavioral problems (History of anxiety/depression and schizophrenia, untreated).         History of homelessness   All other systems  "reviewed and are negative.      Past Medical History:   Diagnosis Date    Anxiety     Depression     Homelessness     Migraine     Obesity     Psoriasis        Allergies   Allergen Reactions    Acetaminophen Other (See Comments)     \"Makes me sleep a lot\"       Past Surgical History:   Procedure Laterality Date    NO PAST SURGERIES         Family History   Problem Relation Age of Onset    Lymphoma Mother         mom passed at 82 with lymphoma    Heart attack Father         dad passed at 63 with a heart attack    No Known Problems Sister     No Known Problems Brother     No Known Problems Daughter        Social History     Socioeconomic History    Marital status: Single   Tobacco Use    Smoking status: Every Day     Packs/day: 0.50     Years: 38.00     Additional pack years: 0.00     Total pack years: 19.00     Types: Cigarettes     Start date: 1/1/1981    Smokeless tobacco: Never   Vaping Use    Vaping Use: Unknown   Substance and Sexual Activity    Alcohol use: No    Drug use: No    Sexual activity: Not Currently           Objective   Physical Exam  Vitals and nursing note reviewed.   Constitutional:       General: She is not in acute distress.     Appearance: Normal appearance. She is not ill-appearing, toxic-appearing or diaphoretic.      Comments: No acute sign of pain or distress.  Nontoxic.  Poor hygiene with strong malodor of urine.  Unkempt.   HENT:      Head: Normocephalic and atraumatic.      Nose: Nose normal.      Mouth/Throat:      Mouth: Mucous membranes are moist.      Dentition: Dental caries present.      Pharynx: Oropharynx is clear.      Comments: Oral mucous membranes are moist.  Extensive dental caries with poor dentition  Eyes:      Extraocular Movements: Extraocular movements intact.      Conjunctiva/sclera: Conjunctivae normal.      Pupils: Pupils are equal, round, and reactive to light.   Cardiovascular:      Rate and Rhythm: Normal rate and regular rhythm. No extrasystoles are present.     " Pulses: Normal pulses.           Dorsalis pedis pulses are 2+ on the right side and 2+ on the left side.        Posterior tibial pulses are 2+ on the right side and 2+ on the left side.      Heart sounds: Normal heart sounds.      Comments: Regular rate and rhythm.  No tachycardia.  No pedal edema to lower extremities.  Strong bilateral DP and PT pulses and brisk capillary refill  Pulmonary:      Effort: Pulmonary effort is normal.      Breath sounds: Normal breath sounds.      Comments: Lungs are clear to auscultation bilaterally  Abdominal:      General: Bowel sounds are normal. There is no distension.      Palpations: Abdomen is soft.      Tenderness: There is no abdominal tenderness. There is no right CVA tenderness, left CVA tenderness, guarding or rebound.      Comments: Abdomen soft and nontender.  No flank or CVA tenderness   Musculoskeletal:         General: Normal range of motion.      Cervical back: Normal range of motion and neck supple.      Right lower leg: No edema.      Left lower leg: No edema.   Skin:     General: Skin is warm and dry.      Findings: Wound present. No bruising, ecchymosis or erythema.      Comments: Once I was able to finally undressed patient's wraps to lower extremities, she had Covaderm with Coban to the left lower extremity.  She had only 3-4 small, superficial scabs and skin was dry.  There was no induration, confluent erythema, soft tissue swelling of either lower extremity, or warmth.  There was only some dry skin to the right lower extremity and no significant wounds or confluent erythema, warmth, or swelling.  Bilateral lower extremity skin exam was stable with some superficial scabs, skin dryness, and history of psoriasis.  No evidence of cellulitis.   Neurological:      General: No focal deficit present.      Mental Status: She is alert and oriented to person, place, and time.      Cranial Nerves: Cranial nerves 2-12 are intact.      Sensory: Sensation is intact.       Motor: Motor function is intact.      Coordination: Coordination is intact.      Comments: Neuro intact and nonfocal   Psychiatric:         Mood and Affect: Mood is anxious. Affect is flat.         Speech: Speech normal.         Behavior: Behavior is agitated and aggressive.         Thought Content: Thought content normal.         Cognition and Memory: Cognition normal.         Judgment: Judgment normal.      Comments: Agitated, aggressive at times, irritable.  Normal cognition and memory and normal judgment.  History of untreated schizophrenia and anxiety.         Procedures           ED Course  ED Course as of 02/01/24 0026   Wed Jan 31, 2024   2300 Patient is afebrile and all other vital signs are stable.  CBC revealed white blood cell count of 11,000 and last CBC from 4 days ago was 7000.  Differential was within normal limits.  Chemistries were also normal.  Lactic acid and procalcitonin and CRP were within normal limits.  Blood cultures x 2 sets are in process.  I personally interpreted EKG which shows sinus rhythm.  No acute ST-T wave changes consistent with ischemia.  I thoroughly reviewed recent hospitalization note and hospital course from 1/24/2024.  Patient left A on 1/29/2024 and was sent home with multiple wound supplies to homeless shelter, at the [facility].  She says that legs have gotten worse.  I went to reevaluate her after my initial assessment and she was not in her room.  I went 2 separate occasions and found patient back in the lobby.  When asked why she was in the lobby, she said that she got anxious in her room and decided to go out and smoke.  We had her sign a no wandering policy and put patient back in her room. [FC]   Thu Feb 01, 2024   0017 After multiple attempts to try to evaluate patient's lower extremities, she finally allowed me to remove her dressings.  She had several Covaderm dressings with coban.  She kept arguing with me and asked why this was necessary.  I advised her  that I needed to evaluate if her legs had cellulitis and the wounds were getting worse with underlying psoriasis.  Patient was very argumentative and finally did allow me to evaluate her legs.  She had only a few scabbed wounds that were healing.  There was no soft tissue swelling of either lower extremity.  There was no confluent erythema or warmth and there was no evidence of cellulitis.  Patient did not have a fever.  We reapplied dressings to the legs and patient is stable for discharge.  She needs to complete course of Keflex that was recently prescribed during her hospitalization.  She has untreated mental illness with schizophreni and anxiety/depression.  Strongly recommend routine treatment for that. [FC]      ED Course User Index  [FC] Cyndie Del Castillo, JACQUES      Recent Results (from the past 24 hour(s))   Comprehensive Metabolic Panel    Collection Time: 01/31/24  7:34 PM    Specimen: Blood   Result Value Ref Range    Glucose 105 (H) 65 - 99 mg/dL    BUN 20 6 - 20 mg/dL    Creatinine 0.69 0.57 - 1.00 mg/dL    Sodium 141 136 - 145 mmol/L    Potassium 4.2 3.5 - 5.2 mmol/L    Chloride 104 98 - 107 mmol/L    CO2 27.0 22.0 - 29.0 mmol/L    Calcium 8.8 8.6 - 10.5 mg/dL    Total Protein 7.3 6.0 - 8.5 g/dL    Albumin 3.7 3.5 - 5.2 g/dL    ALT (SGPT) 11 1 - 33 U/L    AST (SGOT) 17 1 - 32 U/L    Alkaline Phosphatase 89 39 - 117 U/L    Total Bilirubin <0.2 0.0 - 1.2 mg/dL    Globulin 3.6 gm/dL    A/G Ratio 1.0 g/dL    BUN/Creatinine Ratio 29.0 (H) 7.0 - 25.0    Anion Gap 10.0 5.0 - 15.0 mmol/L    eGFR 100.1 >60.0 mL/min/1.73   Lactic Acid, Plasma    Collection Time: 01/31/24  7:34 PM    Specimen: Blood   Result Value Ref Range    Lactate 1.0 0.5 - 2.0 mmol/L   Procalcitonin    Collection Time: 01/31/24  7:34 PM    Specimen: Blood   Result Value Ref Range    Procalcitonin 0.03 0.00 - 0.25 ng/mL   Sedimentation Rate    Collection Time: 01/31/24  7:34 PM    Specimen: Blood   Result Value Ref Range    Sed Rate 64 (H) 0 -  "30 mm/hr   C-reactive Protein    Collection Time: 01/31/24  7:34 PM    Specimen: Blood   Result Value Ref Range    C-Reactive Protein 0.43 0.00 - 0.50 mg/dL   CBC Auto Differential    Collection Time: 01/31/24  7:34 PM    Specimen: Blood   Result Value Ref Range    WBC 11.32 (H) 3.40 - 10.80 10*3/mm3    RBC 4.02 3.77 - 5.28 10*6/mm3    Hemoglobin 11.9 (L) 12.0 - 15.9 g/dL    Hematocrit 37.5 34.0 - 46.6 %    MCV 93.3 79.0 - 97.0 fL    MCH 29.6 26.6 - 33.0 pg    MCHC 31.7 31.5 - 35.7 g/dL    RDW 13.1 12.3 - 15.4 %    RDW-SD 44.6 37.0 - 54.0 fl    MPV 9.2 6.0 - 12.0 fL    Platelets 558 (H) 140 - 450 10*3/mm3    Neutrophil % 74.8 42.7 - 76.0 %    Lymphocyte % 11.0 (L) 19.6 - 45.3 %    Monocyte % 8.0 5.0 - 12.0 %    Eosinophil % 4.3 0.3 - 6.2 %    Basophil % 0.9 0.0 - 1.5 %    Immature Grans % 1.0 (H) 0.0 - 0.5 %    Neutrophils, Absolute 8.48 (H) 1.70 - 7.00 10*3/mm3    Lymphocytes, Absolute 1.24 0.70 - 3.10 10*3/mm3    Monocytes, Absolute 0.90 0.10 - 0.90 10*3/mm3    Eosinophils, Absolute 0.49 (H) 0.00 - 0.40 10*3/mm3    Basophils, Absolute 0.10 0.00 - 0.20 10*3/mm3    Immature Grans, Absolute 0.11 (H) 0.00 - 0.05 10*3/mm3    nRBC 0.0 0.0 - 0.2 /100 WBC   ECG 12 Lead Other; leg cellulitis, infection    Collection Time: 01/31/24  8:05 PM   Result Value Ref Range    QT Interval 396 ms    QTC Interval 439 ms     Note: In addition to lab results from this visit, the labs listed above may include labs taken at another facility or during a different encounter within the last 24 hours. Please correlate lab times with ED admission and discharge times for further clarification of the services performed during this visit.    No orders to display     Vitals:    01/31/24 1900 01/31/24 1901   BP:  132/66   Pulse:  71   Resp:  14   Temp:  97.8 °F (36.6 °C)   SpO2:  99%   Weight: 59 kg (130 lb)    Height: 172.7 cm (68\")      Medications   sodium chloride 0.9 % flush 10 mL (has no administration in time range)   sodium chloride 0.9 % " flush 10 mL (has no administration in time range)     ECG/EMG Results (last 24 hours)       Procedure Component Value Units Date/Time    ECG 12 Lead Other; leg cellulitis, infection [[id number]] Collected: 01/31/24 2005     Updated: 01/31/24 2005     QT Interval 396 ms      QTC Interval 439 ms     Narrative:      Test Reason : Other~  Blood Pressure :   */*   mmHG  Vent. Rate :  74 BPM     Atrial Rate :  74 BPM     P-R Int : 138 ms          QRS Dur :  74 ms      QT Int : 396 ms       P-R-T Axes :  72  78  65 degrees     QTc Int : 439 ms    Normal sinus rhythm  Nonspecific T wave abnormality  Abnormal ECG  When compared with ECG of 26-JAN-2024 16:43,  No significant change was found    Referred By: EDMD           Confirmed By:           ECG 12 Lead Other; leg cellulitis, infection   Preliminary Result   Test Reason : Other~   Blood Pressure :   */*   mmHG   Vent. Rate :  74 BPM     Atrial Rate :  74 BPM      P-R Int : 138 ms          QRS Dur :  74 ms       QT Int : 396 ms       P-R-T Axes :  72  78  65 degrees      QTc Int : 439 ms      Normal sinus rhythm   Nonspecific T wave abnormality   Abnormal ECG   When compared with ECG of 26-JAN-2024 16:43,   No significant change was found      Referred By: EDMD           Confirmed By:                                                  Medical Decision Making  Amount and/or Complexity of Data Reviewed  Labs: ordered.  ECG/medicine tests: ordered.    Risk  Prescription drug management.        Final diagnoses:   Bilateral leg pain   History of cellulitis   History of psoriasis   Homelessness   History of schizophrenia   History of noncompliance with medical treatment   Anxiety and depression       ED Disposition  ED Disposition       ED Disposition   Discharge    Condition   Stable    Comment   --               Janessa Mccall, APRN  [address] [facility]  [address] [place], [place] [id number] [phone]    Call in 1 day  Call tomorrow for close recheck    [facility]  EMERGENCY DEPARTMENT  [address] [facility] [id number] [phone]    If symptoms worsen         Medication List      No changes were made to your prescriptions during this visit.            Cyndie Del Castillo PA-C  02/01/24 0026

## 2024-02-05 LAB
BACTERIA SPEC AEROBE CULT: NORMAL
BACTERIA SPEC AEROBE CULT: NORMAL
QT INTERVAL: 396 MS
QTC INTERVAL: 439 MS

## 2024-03-05 ENCOUNTER — HOSPITAL ENCOUNTER (EMERGENCY)
Facility: HOSPITAL | Age: 59
Discharge: HOME | End: 2024-03-05
Attending: EMERGENCY MEDICINE | Admitting: EMERGENCY MEDICINE
Payer: MEDICAID

## 2024-03-05 ENCOUNTER — APPOINTMENT (OUTPATIENT)
Dept: CT IMAGING | Facility: HOSPITAL | Age: 59
End: 2024-03-05
Payer: MEDICAID

## 2024-03-05 VITALS
BODY MASS INDEX: 19.7 KG/M2 | HEART RATE: 64 BPM | HEIGHT: 68 IN | RESPIRATION RATE: 18 BRPM | OXYGEN SATURATION: 94 % | TEMPERATURE: 98.5 F | DIASTOLIC BLOOD PRESSURE: 61 MMHG | SYSTOLIC BLOOD PRESSURE: 113 MMHG | WEIGHT: 130 LBS

## 2024-03-05 DIAGNOSIS — Z59.00 HOMELESSNESS: ICD-10-CM

## 2024-03-05 DIAGNOSIS — R11.0 NAUSEA: ICD-10-CM

## 2024-03-05 DIAGNOSIS — R10.9 CHRONIC ABDOMINAL PAIN: Primary | ICD-10-CM

## 2024-03-05 DIAGNOSIS — E87.6 HYPOKALEMIA: ICD-10-CM

## 2024-03-05 DIAGNOSIS — F17.200 TOBACCO DEPENDENCE: ICD-10-CM

## 2024-03-05 DIAGNOSIS — F41.9 ANXIETY AND DEPRESSION: ICD-10-CM

## 2024-03-05 DIAGNOSIS — F32.A ANXIETY AND DEPRESSION: ICD-10-CM

## 2024-03-05 DIAGNOSIS — G89.29 CHRONIC ABDOMINAL PAIN: Primary | ICD-10-CM

## 2024-03-05 DIAGNOSIS — F20.9 SCHIZOPHRENIA, UNSPECIFIED TYPE: ICD-10-CM

## 2024-03-05 LAB
ALBUMIN SERPL-MCNC: 4 G/DL (ref 3.5–5.2)
ALBUMIN/GLOB SERPL: 1.4 G/DL
ALP SERPL-CCNC: 99 U/L (ref 39–117)
ALT SERPL W P-5'-P-CCNC: 10 U/L (ref 1–33)
ANION GAP SERPL CALCULATED.3IONS-SCNC: 9 MMOL/L (ref 5–15)
AST SERPL-CCNC: 16 U/L (ref 1–32)
BASOPHILS # BLD AUTO: 0.07 10*3/MM3 (ref 0–0.2)
BASOPHILS NFR BLD AUTO: 1.4 % (ref 0–1.5)
BILIRUB SERPL-MCNC: 0.2 MG/DL (ref 0–1.2)
BUN SERPL-MCNC: 20 MG/DL (ref 6–20)
BUN/CREAT SERPL: 30.3 (ref 7–25)
CALCIUM SPEC-SCNC: 9 MG/DL (ref 8.6–10.5)
CHLORIDE SERPL-SCNC: 105 MMOL/L (ref 98–107)
CO2 SERPL-SCNC: 29 MMOL/L (ref 22–29)
CREAT SERPL-MCNC: 0.66 MG/DL (ref 0.57–1)
D-LACTATE SERPL-SCNC: 1.4 MMOL/L (ref 0.5–2)
DEPRECATED RDW RBC AUTO: 46.4 FL (ref 37–54)
EGFRCR SERPLBLD CKD-EPI 2021: 101.2 ML/MIN/1.73
EOSINOPHIL # BLD AUTO: 0.36 10*3/MM3 (ref 0–0.4)
EOSINOPHIL NFR BLD AUTO: 7 % (ref 0.3–6.2)
ERYTHROCYTE [DISTWIDTH] IN BLOOD BY AUTOMATED COUNT: 13.5 % (ref 12.3–15.4)
GLOBULIN UR ELPH-MCNC: 2.9 GM/DL
GLUCOSE SERPL-MCNC: 129 MG/DL (ref 65–99)
HCT VFR BLD AUTO: 36.7 % (ref 34–46.6)
HGB BLD-MCNC: 11.5 G/DL (ref 12–15.9)
IMM GRANULOCYTES # BLD AUTO: 0.01 10*3/MM3 (ref 0–0.05)
IMM GRANULOCYTES NFR BLD AUTO: 0.2 % (ref 0–0.5)
LIPASE SERPL-CCNC: 35 U/L (ref 13–60)
LYMPHOCYTES # BLD AUTO: 0.72 10*3/MM3 (ref 0.7–3.1)
LYMPHOCYTES NFR BLD AUTO: 14 % (ref 19.6–45.3)
MCH RBC QN AUTO: 29.3 PG (ref 26.6–33)
MCHC RBC AUTO-ENTMCNC: 31.3 G/DL (ref 31.5–35.7)
MCV RBC AUTO: 93.4 FL (ref 79–97)
MONOCYTES # BLD AUTO: 0.55 10*3/MM3 (ref 0.1–0.9)
MONOCYTES NFR BLD AUTO: 10.7 % (ref 5–12)
NEUTROPHILS NFR BLD AUTO: 3.42 10*3/MM3 (ref 1.7–7)
NEUTROPHILS NFR BLD AUTO: 66.7 % (ref 42.7–76)
NRBC BLD AUTO-RTO: 0 /100 WBC (ref 0–0.2)
PLATELET # BLD AUTO: 263 10*3/MM3 (ref 140–450)
PMV BLD AUTO: 10.5 FL (ref 6–12)
POTASSIUM SERPL-SCNC: 3.3 MMOL/L (ref 3.5–5.2)
PROT SERPL-MCNC: 6.9 G/DL (ref 6–8.5)
RBC # BLD AUTO: 3.93 10*6/MM3 (ref 3.77–5.28)
SODIUM SERPL-SCNC: 143 MMOL/L (ref 136–145)
WBC NRBC COR # BLD AUTO: 5.13 10*3/MM3 (ref 3.4–10.8)

## 2024-03-05 PROCEDURE — 96374 THER/PROPH/DIAG INJ IV PUSH: CPT

## 2024-03-05 PROCEDURE — 99284 EMERGENCY DEPT VISIT MOD MDM: CPT

## 2024-03-05 PROCEDURE — 25810000003 SODIUM CHLORIDE 0.9 % SOLUTION: Performed by: PHYSICIAN ASSISTANT

## 2024-03-05 PROCEDURE — 80053 COMPREHEN METABOLIC PANEL: CPT | Performed by: PHYSICIAN ASSISTANT

## 2024-03-05 PROCEDURE — 83605 ASSAY OF LACTIC ACID: CPT | Performed by: PHYSICIAN ASSISTANT

## 2024-03-05 PROCEDURE — 83690 ASSAY OF LIPASE: CPT | Performed by: PHYSICIAN ASSISTANT

## 2024-03-05 PROCEDURE — 85025 COMPLETE CBC W/AUTO DIFF WBC: CPT | Performed by: PHYSICIAN ASSISTANT

## 2024-03-05 PROCEDURE — 25010000002 ONDANSETRON PER 1 MG: Performed by: PHYSICIAN ASSISTANT

## 2024-03-05 PROCEDURE — 74176 CT ABD & PELVIS W/O CONTRAST: CPT

## 2024-03-05 RX ORDER — DICYCLOMINE HCL 20 MG
20 TABLET ORAL EVERY 6 HOURS PRN
Qty: 21 TABLET | Refills: 0 | Status: SHIPPED | OUTPATIENT
Start: 2024-03-05

## 2024-03-05 RX ORDER — DICYCLOMINE HYDROCHLORIDE 10 MG/1
20 CAPSULE ORAL ONCE
Status: COMPLETED | OUTPATIENT
Start: 2024-03-05 | End: 2024-03-05

## 2024-03-05 RX ORDER — ONDANSETRON 4 MG/1
4 TABLET, ORALLY DISINTEGRATING ORAL EVERY 4 HOURS
Qty: 12 TABLET | Refills: 0 | Status: SHIPPED | OUTPATIENT
Start: 2024-03-05

## 2024-03-05 RX ORDER — POTASSIUM CHLORIDE 750 MG/1
20 CAPSULE, EXTENDED RELEASE ORAL ONCE
Status: COMPLETED | OUTPATIENT
Start: 2024-03-05 | End: 2024-03-05

## 2024-03-05 RX ORDER — OMEPRAZOLE 20 MG/1
20 CAPSULE, DELAYED RELEASE ORAL 2 TIMES DAILY
Qty: 60 CAPSULE | Refills: 0 | Status: SHIPPED | OUTPATIENT
Start: 2024-03-05

## 2024-03-05 RX ORDER — ONDANSETRON 2 MG/ML
4 INJECTION INTRAMUSCULAR; INTRAVENOUS ONCE
Status: COMPLETED | OUTPATIENT
Start: 2024-03-05 | End: 2024-03-05

## 2024-03-05 RX ADMIN — POTASSIUM CHLORIDE 20 MEQ: 750 CAPSULE, EXTENDED RELEASE ORAL at 05:10

## 2024-03-05 RX ADMIN — DICYCLOMINE HYDROCHLORIDE 20 MG: 10 CAPSULE ORAL at 04:08

## 2024-03-05 RX ADMIN — SODIUM CHLORIDE 1000 ML: 9 INJECTION, SOLUTION INTRAVENOUS at 04:06

## 2024-03-05 RX ADMIN — ONDANSETRON 4 MG: 2 INJECTION INTRAMUSCULAR; INTRAVENOUS at 04:06

## 2024-03-05 SDOH — ECONOMIC STABILITY - HOUSING INSECURITY: HOMELESSNESS UNSPECIFIED: Z59.00

## 2024-03-05 NOTE — ED PROVIDER NOTES
Subjective   History of Present Illness   is a 59-year-old female that presents the ER with chronic periumbilical abdominal pain that is sharp and cramping without radiation.  Patient reports associated anorexia with nausea.  She had a normal bowel movement yesterday and denies any hematochezia or melena.  She denies any previous abdominal surgeries or previous colonoscopy.  She denies fever or chills.  She denies any dysuria, urgency, or frequency.  Past medical history is significant for homelessness, psoriasis, schizophrenia, tobacco dependence, migraines, and anxiety/depression.  She was just seen at  emergency room on 3/3/2024 for similar symptoms and ER workup was reassuring.  Her last CT imaging was on 2/7/2024 which revealed gallstones without evidence of acute cholecystitis.  Patient denies any upper abdominal pain at present.  Pain is worsened with eating and says that she ate some vanilla wafers tonight and that caused increased periumbilical abdominal pain.  After reviewing ER evaluation from  on 2/19/2024, patient had presented to 's ER 9 times in the last 6 weeks for recurrent abdominal pain.  After reviewing different ER notes, symptoms seem to improve with Zofran and Bentyl.    History provided by:  Patient  Abdominal Pain  Pain location:  Periumbilical  Pain quality: cramping    Pain radiates to:  Does not radiate  Pain severity:  Moderate  Duration:  8 weeks  Timing:  Constant  Progression:  Unchanged  Chronicity:  Chronic  Context: not alcohol use, not previous surgeries (No previous abdominal surgeries) and not suspicious food intake    Relieved by: Bentyl and Zofran.  Worsened by:  Eating  Associated symptoms: anorexia and nausea    Associated symptoms: no belching, no chest pain, no chills, no constipation (Normal BM yesterday), no cough, no diarrhea, no dysuria, no fatigue, no fever, no flatus, no hematemesis, no hematochezia, no hematuria, no shortness of breath, no sore throat and no  "vomiting    Risk factors: no alcohol abuse, has not had multiple surgeries and no NSAID use        Review of Systems   Constitutional:  Positive for appetite change. Negative for chills, fatigue and fever.   HENT: Negative.  Negative for congestion, ear pain, postnasal drip, rhinorrhea, sinus pressure, sinus pain, sneezing and sore throat.    Respiratory: Negative.  Negative for cough and shortness of breath.    Cardiovascular: Negative.  Negative for chest pain.   Gastrointestinal:  Positive for abdominal pain (Periumbilical abdominal cramping without radiation), anorexia and nausea. Negative for constipation (Normal BM yesterday), diarrhea, flatus, hematemesis, hematochezia and vomiting.   Genitourinary: Negative.  Negative for dysuria, flank pain, frequency, hematuria and urgency.   Musculoskeletal: Negative.  Negative for back pain.   Neurological: Negative.  Negative for dizziness and headaches.   Psychiatric/Behavioral:  Positive for behavioral problems (Anxiety, depression, and schizophrenia).         History of homelessness   All other systems reviewed and are negative.      Past Medical History:   Diagnosis Date    Anxiety     Depression     Homelessness     Migraine     Obesity     Psoriasis        Allergies   Allergen Reactions    Acetaminophen Other (See Comments)     \"Makes me sleep a lot\"       Past Surgical History:   Procedure Laterality Date    NO PAST SURGERIES         Family History   Problem Relation Age of Onset    Lymphoma Mother         mom passed at 82 with lymphoma    Heart attack Father         dad passed at 63 with a heart attack    No Known Problems Sister     No Known Problems Brother     No Known Problems Daughter        Social History     Socioeconomic History    Marital status: Single   Tobacco Use    Smoking status: Every Day     Current packs/day: 0.50     Average packs/day: 0.5 packs/day for 43.2 years (21.6 ttl pk-yrs)     Types: Cigarettes     Start date: 1/1/1981    Smokeless " tobacco: Never   Vaping Use    Vaping status: Unknown   Substance and Sexual Activity    Alcohol use: No    Drug use: No    Sexual activity: Not Currently           Objective   Physical Exam  Vitals and nursing note reviewed.   Constitutional:       General: She is not in acute distress.     Appearance: Normal appearance. She is not ill-appearing, toxic-appearing or diaphoretic.      Comments: No acute sign of pain or distress.  Nontoxic.  Disheveled and unkempt.  Patient has 4 previous ER bracelets on her left wrist.    HENT:      Head: Normocephalic and atraumatic.      Nose: Nose normal.      Mouth/Throat:      Mouth: Mucous membranes are moist.      Comments: Oral mucous membranes are moist  Eyes:      Extraocular Movements: Extraocular movements intact.      Conjunctiva/sclera: Conjunctivae normal.      Pupils: Pupils are equal, round, and reactive to light.   Cardiovascular:      Rate and Rhythm: Normal rate and regular rhythm. No extrasystoles are present.     Pulses: Normal pulses.      Heart sounds: Normal heart sounds.      Comments: Regular rate and rhythm.  No ectopy  Pulmonary:      Effort: Pulmonary effort is normal.      Breath sounds: Normal breath sounds.      Comments: Lungs are clear to auscultation bilaterally  Abdominal:      General: Bowel sounds are normal. There is no distension.      Palpations: Abdomen is soft.      Tenderness: There is abdominal tenderness in the periumbilical area. There is no right CVA tenderness, left CVA tenderness, guarding or rebound. Negative signs include Sotelo's sign and McBurney's sign.      Comments: Abdomen soft without distention.  Active bowel sounds in all 4 quadrants.  Tenderness to periumbilical region without rebound or guarding.  No right lower quadrant tenderness at McBurney's point.  No right upper quadrant tenderness and negative Sotelo sign.  No left-sided abdominal tenderness and no flank or CVA tenderness.  Abdominal exam is benign and  nonsurgical.   Musculoskeletal:         General: Normal range of motion.      Cervical back: Normal range of motion and neck supple.      Right lower leg: No edema.      Left lower leg: No edema.   Skin:     General: Skin is warm and dry.   Neurological:      General: No focal deficit present.      Mental Status: She is alert and oriented to person, place, and time.      Cranial Nerves: Cranial nerves 2-12 are intact.      Sensory: Sensation is intact.      Motor: Motor function is intact.      Coordination: Coordination is intact.      Comments: Neuro intact and nonfocal   Psychiatric:         Mood and Affect: Mood and affect normal.         Speech: Speech normal.         Behavior: Behavior normal. Behavior is cooperative.         Thought Content: Thought content normal.         Cognition and Memory: Cognition normal.      Comments: Unkempt and disheveled.  Poor hygiene.  Patient has 4 previous ER bracelets from  on her left wrist.  She has normal speech and is cooperative.  Normal cognition.         Procedures           ED Course  ED Course as of 03/05/24 0454   Tue Mar 05, 2024   0431 CBC reveals normal white blood cell count of 5000 with normal differential.  Lactic acid is 1.4. [FC]   0444 Chemistries are essentially normal other than mild decrease in potassium at 3.3.  We will give KCl 20 mEq by mouth here in the ER.  LFTs were normal.  Lipase is 35.  Awaiting CT of the abdomen/pelvis without contrast results. [FC]   0453 CT of the abdomen/pelvis without contrast reveals no acute intra-abdominal or intrapelvic abnormality.  Patient has known gallstones without evidence of acute cholecystitis.  Colonic diverticulosis.  Small hiatal hernia.  Patient has no right upper quadrant tenderness on physical exam.  She only has mild periumbilical tenderness without rebound or guarding.  Abdominal exam is benign and nonsurgical.  We will prescribe Bentyl, Zofran, and omeprazole on discharge.  With recurrent abdominal  pain, we suggest GI consult with Dr. Navas to consider endoscopy in the near future.  Avoid greasy, spicy, or fatty foods.  Strongly recommend tobacco cessation.  Patient is ready for discharge to home.  Return to the ER if worsening symptoms. [FC]      ED Course User Index  [FC] Cyndie Del Castillo, JACQUES                                   Recent Results (from the past 24 hour(s))   Comprehensive Metabolic Panel    Collection Time: 03/05/24  3:57 AM    Specimen: Blood   Result Value Ref Range    Glucose 129 (H) 65 - 99 mg/dL    BUN 20 6 - 20 mg/dL    Creatinine 0.66 0.57 - 1.00 mg/dL    Sodium 143 136 - 145 mmol/L    Potassium 3.3 (L) 3.5 - 5.2 mmol/L    Chloride 105 98 - 107 mmol/L    CO2 29.0 22.0 - 29.0 mmol/L    Calcium 9.0 8.6 - 10.5 mg/dL    Total Protein 6.9 6.0 - 8.5 g/dL    Albumin 4.0 3.5 - 5.2 g/dL    ALT (SGPT) 10 1 - 33 U/L    AST (SGOT) 16 1 - 32 U/L    Alkaline Phosphatase 99 39 - 117 U/L    Total Bilirubin 0.2 0.0 - 1.2 mg/dL    Globulin 2.9 gm/dL    A/G Ratio 1.4 g/dL    BUN/Creatinine Ratio 30.3 (H) 7.0 - 25.0    Anion Gap 9.0 5.0 - 15.0 mmol/L    eGFR 101.2 >60.0 mL/min/1.73   Lipase    Collection Time: 03/05/24  3:57 AM    Specimen: Blood   Result Value Ref Range    Lipase 35 13 - 60 U/L   Lactic Acid, Plasma    Collection Time: 03/05/24  3:57 AM    Specimen: Blood   Result Value Ref Range    Lactate 1.4 0.5 - 2.0 mmol/L   CBC Auto Differential    Collection Time: 03/05/24  3:57 AM    Specimen: Blood   Result Value Ref Range    WBC 5.13 3.40 - 10.80 10*3/mm3    RBC 3.93 3.77 - 5.28 10*6/mm3    Hemoglobin 11.5 (L) 12.0 - 15.9 g/dL    Hematocrit 36.7 34.0 - 46.6 %    MCV 93.4 79.0 - 97.0 fL    MCH 29.3 26.6 - 33.0 pg    MCHC 31.3 (L) 31.5 - 35.7 g/dL    RDW 13.5 12.3 - 15.4 %    RDW-SD 46.4 37.0 - 54.0 fl    MPV 10.5 6.0 - 12.0 fL    Platelets 263 140 - 450 10*3/mm3    Neutrophil % 66.7 42.7 - 76.0 %    Lymphocyte % 14.0 (L) 19.6 - 45.3 %    Monocyte % 10.7 5.0 - 12.0 %    Eosinophil % 7.0 (H) 0.3 - 6.2  "%    Basophil % 1.4 0.0 - 1.5 %    Immature Grans % 0.2 0.0 - 0.5 %    Neutrophils, Absolute 3.42 1.70 - 7.00 10*3/mm3    Lymphocytes, Absolute 0.72 0.70 - 3.10 10*3/mm3    Monocytes, Absolute 0.55 0.10 - 0.90 10*3/mm3    Eosinophils, Absolute 0.36 0.00 - 0.40 10*3/mm3    Basophils, Absolute 0.07 0.00 - 0.20 10*3/mm3    Immature Grans, Absolute 0.01 0.00 - 0.05 10*3/mm3    nRBC 0.0 0.0 - 0.2 /100 WBC     Note: In addition to lab results from this visit, the labs listed above may include labs taken at another facility or during a different encounter within the last 24 hours. Please correlate lab times with ED admission and discharge times for further clarification of the services performed during this visit.    CT Abdomen Pelvis Without Contrast   Final Result   Impression:   1.No acute abdominal or pelvic abnormality.   2.Cholelithiasis without evidence of acute cholecystitis.   3.Colonic diverticulosis.   4.Small hiatal hernia.            Electronically Signed: Alex Jeter MD     3/5/2024 4:47 AM EST     Workstation ID: [id number]        Vitals:    03/05/24 0300 03/05/24 0300 03/05/24 0406   BP: 121/81 121/81 122/63   BP Location:  Right arm    Patient Position:  Sitting    Pulse:  72 67   Resp:  18    Temp:  98.5 °F (36.9 °C)    TempSrc:  Oral    SpO2:  96% 95%   Weight:  59 kg (130 lb)    Height:  172.7 cm (68\")      Medications   potassium chloride (MICRO-K/KLOR-CON) CR capsule (has no administration in time range)   sodium chloride 0.9 % bolus 1,000 mL (1,000 mL Intravenous New Bag 3/5/24 0406)   ondansetron (ZOFRAN) injection 4 mg (4 mg Intravenous Given 3/5/24 0406)   dicyclomine (BENTYL) capsule 20 mg (20 mg Oral Given 3/5/24 0408)     ECG/EMG Results (last 24 hours)       ** No results found for the last 24 hours. **          No orders to display                 Medical Decision Making      Final diagnoses:   Chronic abdominal pain   Nausea   Hypokalemia   Tobacco dependence   Anxiety and depression "   Schizophrenia, unspecified type   Homelessness       ED Disposition  ED Disposition       ED Disposition   Discharge    Condition   Stable    Comment   --               Ramón Navas MD  [address] [facility]  [address]  [place] [address]  [phone]    Call today  Call today for recheck due to recurrent abdominal pain.  Patient may benefit from colonoscopy in the near future    Janessa Mccall, APRN  [address] [facility]  [address]  [place] [address]  [phone]    Schedule an appointment as soon as possible for a visit in 2 days  Close PCP follow-up for recheck.  Patient needs referral to GI and PCP needs to coordinate this    [facility] EMERGENCY DEPARTMENT  [address] [facility] [address] [phone]    If symptoms worsen         Medication List        New Prescriptions      dicyclomine 20 MG tablet  Commonly known as: BENTYL  Take 1 tablet by mouth Every 6 (Six) Hours As Needed for Abdominal Cramping.     omeprazole 20 MG capsule  Commonly known as: priLOSEC  Take 1 capsule by mouth 2 (Two) Times a Day.     ondansetron ODT 4 MG disintegrating tablet  Commonly known as: ZOFRAN-ODT  Place 1 tablet on the tongue Every 4 (Four) Hours.               Where to Get Your Medications        These medications were sent to [facility] [id number] - [place] [address] AT [facility] [address] - [phone]  - [phone] [address] [place], [facility] [place] [address]      Phone: [phone]   dicyclomine 20 MG tablet  omeprazole 20 MG capsule  ondansetron ODT 4 MG disintegrating tablet            Cyndie Del Castillo, JACQUES  03/05/24 0454

## 2024-03-05 NOTE — DISCHARGE INSTRUCTIONS
ER evaluation reveals normal CBC and chemistries were essentially normal other than mild decrease in potassium with level 3.3 and normal level being 3.5.  We gave oral potassium during the ER course and will give information on potassium content in foods on discharge.  CT imaging reveals no acute infectious or inflammatory process.  With recurrent abdominal pain and nausea, recommend close GI follow-up with Dr. Navas to consider endoscopy in the near future.  Avoid greasy, spicy, or fatty foods.  Rx for Bentyl, Zofran, and omeprazole 20 mg by mouth twice daily dispense 60 no refills.  Abdominal exam is benign.  Return to the ER if worsening symptoms.

## 2024-04-08 ENCOUNTER — HOSPITAL ENCOUNTER (EMERGENCY)
Facility: HOSPITAL | Age: 59
Discharge: HOME | End: 2024-04-08
Attending: EMERGENCY MEDICINE | Admitting: EMERGENCY MEDICINE
Payer: MEDICAID

## 2024-04-08 VITALS
TEMPERATURE: 98.2 F | DIASTOLIC BLOOD PRESSURE: 64 MMHG | OXYGEN SATURATION: 94 % | RESPIRATION RATE: 12 BRPM | BODY MASS INDEX: 19.4 KG/M2 | HEIGHT: 68 IN | SYSTOLIC BLOOD PRESSURE: 137 MMHG | WEIGHT: 128 LBS | HEART RATE: 67 BPM

## 2024-04-08 DIAGNOSIS — J02.9 ACUTE PHARYNGITIS, UNSPECIFIED ETIOLOGY: Primary | ICD-10-CM

## 2024-04-08 DIAGNOSIS — Z59.00 HOMELESSNESS: ICD-10-CM

## 2024-04-08 LAB
FLUAV RNA RESP QL NAA+PROBE: NOT DETECTED
FLUBV RNA RESP QL NAA+PROBE: NOT DETECTED
RSV RNA RESP QL NAA+PROBE: NOT DETECTED
SARS-COV-2 RNA RESP QL NAA+PROBE: NOT DETECTED

## 2024-04-08 PROCEDURE — 87637 SARSCOV2&INF A&B&RSV AMP PRB: CPT | Performed by: EMERGENCY MEDICINE

## 2024-04-08 PROCEDURE — 99283 EMERGENCY DEPT VISIT LOW MDM: CPT

## 2024-04-08 SDOH — ECONOMIC STABILITY - HOUSING INSECURITY: HOMELESSNESS UNSPECIFIED: Z59.00

## 2024-04-08 NOTE — ED PROVIDER NOTES
EMERGENCY DEPARTMENT ENCOUNTER    Pt Name: Keyla Strong  MRN: [id number]  Pt :   1965  Room Number:    Date of encounter:  2024  PCP: Janessa Mccall APRN  ED Provider: GAYLA Canseco    Historian: Patient    HPI:  Chief Complaint: Sore Throat    Context: Keyla Strong is a 59 y.o. female who presents to the ED c/o sore throat    History provided by:  Patient  Sore Throat  Location:  Generalized  Quality:  Aching  Severity:  Mild  Onset quality:  Unable to specify  Timing:  Constant  Progression:  Unchanged  Chronicity:  Chronic  Relieved by:  Nothing  Worsened by:  Nothing  Ineffective treatments:  None tried       REVIEW OF SYSTEMS  A chief complaint appropriate review of systems was completed and is negative except as noted in the HPI.     PAST MEDICAL HISTORY  Past Medical History:   Diagnosis Date    Anxiety     Depression     Homelessness     Migraine     Obesity     Psoriasis        PAST SURGICAL HISTORY  Past Surgical History:   Procedure Laterality Date    NO PAST SURGERIES         FAMILY HISTORY  Family History   Problem Relation Age of Onset    Lymphoma Mother         mom passed at 82 with lymphoma    Heart attack Father         dad passed at 63 with a heart attack    No Known Problems Sister     No Known Problems Brother     No Known Problems Daughter        SOCIAL HISTORY  Social History     Socioeconomic History    Marital status: Single   Tobacco Use    Smoking status: Every Day     Current packs/day: 0.50     Average packs/day: 0.5 packs/day for 43.3 years (21.6 ttl pk-yrs)     Types: Cigarettes     Start date: 1981    Smokeless tobacco: Never   Vaping Use    Vaping status: Unknown   Substance and Sexual Activity    Alcohol use: No    Drug use: No    Sexual activity: Not Currently       ALLERGIES  Acetaminophen    PHYSICAL EXAM  Physical Exam  GENERAL:   Appears in no acute distress.  HENT: Nares patent.  Not erythematous.  EYES: No scleral icterus.  CV: Regular  rhythm, regular rate.  RESPIRATORY: Normal effort.  ABDOMEN: Soft, nontender  MUSCULOSKELETAL: No deformities.   NEURO: Alert, moves all extremities, follows commands.  SKIN: Warm, dry, no rash visualized.  I have reviewed the triage vital signs and nursing notes.     LAB RESULTS  Results for orders placed or performed during the hospital encounter of 04/08/24   COVID-19, FLU A/B, RSV PCR 1 HR TAT - Swab, Nasopharynx    Specimen: Nasopharynx; Swab   Result Value Ref Range    COVID19 Not Detected Not Detected - Ref. Range    Influenza A PCR Not Detected Not Detected    Influenza B PCR Not Detected Not Detected    RSV, PCR Not Detected Not Detected       If labs were ordered, I independently reviewed the results and considered them in treating the patient.    RADIOLOGY  No orders to display     [] Radiologist's Report Reviewed:  I ordered and independently interpreted the above noted radiographic studies.  See radiologist's dictation for official interpretation.      PROCEDURES    Procedures    No orders to display       MEDICATIONS GIVEN IN ER    Medications - No data to display    MEDICAL DECISION MAKING, PROGRESS, and CONSULTS   Medical Decision Making  Problems Addressed:  Acute pharyngitis, unspecified etiology: acute illness or injury        All labs have been independently reviewed by me.  All radiology studies have been interpreted by me and the radiologist dictating the report.  All EKG's have been independently interpreted by me as well as and overseeing attending physician.    [] Discussed with radiology regarding test interpretation:    Discussion below represents my analysis of pertinent findings related to patient's condition, differential diagnosis, treatment plan and final disposition.    Differential diagnosis:  The differential diagnosis associated with the patient's presentation includes: Viral pharyngitis, allergies, strep pharyngitis, infectious mononucleosis, influenza, smoking, dry air,  esophageal reflux     Additional sources  Discussed/ obtained information from independent historians:   [] Spouse  [] Parent  [] Family member  [] Friend  [] EMS   [] Other:  External (non-ED) record review:   [] Inpatient record:   [] Office record:   [] Outpatient record:   [] Prior Outpatient labs:   [] Prior Outpatient radiology:   [] Primary Care record:   [x] Outside ED record: Personally reviewed the 8 emergency department visits documented in March, 2024   [] Other:   Patient's care impacted by:   [] Diabetes  [] Hypertension  [] Hyperlipidemia  [] Hypothyroidism   [] Coronary Artery Disease   [] COPD   [] Cancer   [] Obesity  [] GERD   [] Tobacco Abuse   [] Substance Abuse    [x] Anxiety   [x] Depression   [] Other:   Care significantly affected by Social Determinants of Health (housing and economic circumstances, unemployment)    [x] Yes     [] No   If yes, Patient's care significantly limited by  Social Determinants of Health including:   [x] Inadequate housing   [x] Low income   [] Alcoholism and drug addiction in family   [] Problems related to primary support group   [] Unemployment   [] Problems related to employment   [] Other Social Determinants of Health:     Shared decision making:  I had a discussion with the patient/family regarding diagnosis, diagnostic results, treatment plan, and medications.  The patient/family indicated understanding of these instructions.  I spent adequate time at the bedside preceding discharge necessary to personally discuss the aftercare instructions, giving patient education, providing explanations of the results of our evaluations/findings, and my decision making to assure that the patient/family understand the plan of care.  Time was allotted to answer questions at that time and throughout the ED course.  Emphasis was placed on timely follow-up after discharge.  I also discussed the potential for the development of an acute emergent condition requiring further  evaluation, admission, or even surgical intervention. I discussed that we found nothing during the visit today indicating the need for further workup, admission, or the presence of an unstable medical condition.  I encouraged the patient to return to the emergency department immediately for ANY concerns, worsening, new complaints, or if symptoms persist and unable to seek follow-up in a timely fashion.  The patient/family expressed understanding and agreement with this plan.  The patient will follow-up with primary care for reevaluation.      Orders placed during this visit:  Orders Placed This Encounter   Procedures    COVID PRE-OP / PRE-PROCEDURE SCREENING ORDER (NO ISOLATION) - Swab, Nasopharynx    COVID-19, FLU A/B, RSV PCR 1 HR TAT - Swab, Nasopharynx       I considered prescription management  with:   [] Pain medication  [] Antiviral  [x] Antibiotic: Clinical presentation and ER workup without evidence of bacterial infection requiring treatment with antibiotics.   [] Other:   Rationale:    ED Course:    ED Course as of 04/08/24 0448   Mon Apr 08, 2024   0445 Vitals and Telemetry tracing was reviewed and directly interpreted by myself demonstrating blood pressure 137/64, afebrile, heart rate of 77, respirations of 12 breaths/min and oxygen saturation 97% on room air [JG]   0446 BP: 137/64 [JG]   0446 Temp: 98.2 °F (36.8 °C) [JG]   0446 Heart Rate: 77 [JG]   0446 Resp: 12 [JG]   0446 SpO2: 97 % [JG]   0446 Labs studies were reviewed and directly interpreted by myself and demonstrated nasopharyngeal swab negative for COVID-19, influenza and RSV [JG]   0447 Nontoxic-appearing homeless 59-year-old female presents ED for evaluation of sore throat.  No acute or emergent findings demonstrated on physical exam.  Nasopharyngeal swab negative for COVID-19 influenza and RSV. Based on clinical presentation and workup in ED no clear indication for treatment beyond symptomatic management. At time of discharge disposition  patient is afebrile, nontoxic appearing, vital signs stable and able to maintain O2 sats of 94% on room air. Patient will be discharged home with symptomatic care and outpatient follow up. [JG]      ED Course User Index  [JG] Juan Ortiz PA          DIAGNOSIS  Final diagnoses:   Acute pharyngitis, unspecified etiology   Homelessness       DISPOSITION    ED Disposition       ED Disposition   Discharge    Condition   Stable    Comment   --               Please note that portions of this document were completed with voice recognition software.        Juan Ortiz PA  04/08/24 0448

## 2024-04-11 ENCOUNTER — HOSPITAL ENCOUNTER (EMERGENCY)
Facility: HOSPITAL | Age: 59
Discharge: HOME | End: 2024-04-11
Attending: EMERGENCY MEDICINE
Payer: MEDICAID

## 2024-04-11 VITALS
TEMPERATURE: 98 F | OXYGEN SATURATION: 99 % | RESPIRATION RATE: 16 BRPM | HEART RATE: 81 BPM | WEIGHT: 128 LBS | DIASTOLIC BLOOD PRESSURE: 97 MMHG | BODY MASS INDEX: 19.4 KG/M2 | SYSTOLIC BLOOD PRESSURE: 130 MMHG | HEIGHT: 68 IN

## 2024-04-11 DIAGNOSIS — R52 GENERALIZED BODY ACHES: Primary | ICD-10-CM

## 2024-04-11 DIAGNOSIS — Z59.00 HOMELESS: ICD-10-CM

## 2024-04-11 PROCEDURE — 99283 EMERGENCY DEPT VISIT LOW MDM: CPT

## 2024-04-11 RX ORDER — IBUPROFEN 800 MG/1
800 TABLET ORAL ONCE
Status: COMPLETED | OUTPATIENT
Start: 2024-04-11 | End: 2024-04-11

## 2024-04-11 RX ADMIN — IBUPROFEN 800 MG: 800 TABLET, FILM COATED ORAL at 23:22

## 2024-04-11 SDOH — ECONOMIC STABILITY - HOUSING INSECURITY: HOMELESSNESS UNSPECIFIED: Z59.00

## 2024-04-12 NOTE — DISCHARGE INSTRUCTIONS
Symptomatic care is recommended with Tylenol and or ibuprofen as needed for generalized body aches and pain. Take all medications as prescribed and instructed. Follow up with your primary care as directed or return to Emergency Department with worsening of symptoms.

## 2024-04-12 NOTE — ED PROVIDER NOTES
" EMERGENCY DEPARTMENT ENCOUNTER    Pt Name: Keyla Strong  MRN: [id number]  Pt :   1965  Room Number:    Date of encounter:  2024  PCP: Janessa Mccall APRN  ED Provider: GAYLA Canseco    Historian: Patient    HPI:  Chief Complaint: \"all over body pain\"    Context: Keyla Strong is a homeless 59 y.o. female who presents to the ED who presents to the ED with generalized body aches and sore throat. Denies fever. No nausea or vomiting. No diarrhea, constipation or urinary complaints. Patient reports clothes being wet from being outside in the rain.   HPI     REVIEW OF SYSTEMS  A chief complaint appropriate review of systems was completed and is negative except as noted in the HPI.     PAST MEDICAL HISTORY  Past Medical History:   Diagnosis Date    Anxiety     Depression     Homelessness     Migraine     Obesity     Psoriasis        PAST SURGICAL HISTORY  Past Surgical History:   Procedure Laterality Date    NO PAST SURGERIES         FAMILY HISTORY  Family History   Problem Relation Age of Onset    Lymphoma Mother         mom passed at 82 with lymphoma    Heart attack Father         dad passed at 63 with a heart attack    No Known Problems Sister     No Known Problems Brother     No Known Problems Daughter        SOCIAL HISTORY  Social History     Socioeconomic History    Marital status: Single   Tobacco Use    Smoking status: Every Day     Current packs/day: 0.50     Average packs/day: 0.5 packs/day for 43.3 years (21.6 ttl pk-yrs)     Types: Cigarettes     Start date: 1981    Smokeless tobacco: Never   Vaping Use    Vaping status: Unknown   Substance and Sexual Activity    Alcohol use: No    Drug use: No    Sexual activity: Not Currently       ALLERGIES  Acetaminophen    PHYSICAL EXAM  Physical Exam  GENERAL:   Appears in no acute distress.  Disheveled.  HENT: Nares patent.  Poor dentition  EYES: No scleral icterus.  CV: Regular rhythm, regular rate.  RESPIRATORY: Normal effort.  No " audible wheezes, rales or rhonchi.  ABDOMEN: Soft, nontender  MUSCULOSKELETAL: No deformities.   NEURO: Alert, moves all extremities, follows commands.  SKIN: Warm, dry, no rash visualized.    I have reviewed the triage vital signs and nursing notes.     LAB RESULTS      If labs were ordered, I independently reviewed the results and considered them in treating the patient.    RADIOLOGY  No orders to display     [] Radiologist's Report Reviewed:  I ordered and independently interpreted the above noted radiographic studies.  See radiologist's dictation for official interpretation.      PROCEDURES    Procedures    No orders to display       MEDICATIONS GIVEN IN ER    Medications   ibuprofen (ADVIL,MOTRIN) tablet 800 mg (800 mg Oral Given 4/11/24 6802)       MEDICAL DECISION MAKING, PROGRESS, and CONSULTS   Medical Decision Making  Problems Addressed:  Generalized body aches: complicated acute illness or injury  Homeless: complicated acute illness or injury    Risk  Prescription drug management.        All labs have been independently reviewed by me.  All radiology studies have been interpreted by me and the radiologist dictating the report.  All EKG's have been independently interpreted by me as well as and overseeing attending physician.    [] Discussed with radiology regarding test interpretation:    Discussion below represents my analysis of pertinent findings related to patient's condition, differential diagnosis, treatment plan and final disposition.    Differential diagnosis:  The differential diagnosis associated with the patient's presentation includes: allergies, viral syndrome,  trauma, inflammation, dehydration, substance abuse and others.      Additional sources  Discussed/ obtained information from independent historians:   [] Spouse  [] Parent  [] Family member  [] Friend  [] EMS   [] Other:  External (non-ED) record review:   [] Inpatient record:   [] Office record:   [] Outpatient record:   [] Prior  Outpatient labs:   [] Prior Outpatient radiology:   [] Primary Care record:   [x] Outside ED record: Patient reviewed multiple ED visits from this facility and outside ED's for similar complaints.   [x] Other: This is patient's sixth visit to the emergency department in the month of April, 8 visits to the emergency department in March  Patient's care impacted by:   [] Diabetes  [] Hypertension  [] Hyperlipidemia  [] Hypothyroidism   [] Coronary Artery Disease   [] COPD   [] Cancer   [] Obesity  [] GERD   [] Tobacco Abuse   [] Substance Abuse    [x] Anxiety   [x] Depression   [] Other:   Care significantly affected by Social Determinants of Health (housing and economic circumstances, unemployment)    [x] Yes     [] No   If yes, Patient's care significantly limited by  Social Determinants of Health including:   [x] Inadequate housing   [x] Low income   [] Alcoholism and drug addiction in family   [] Problems related to primary support group   [] Unemployment   [] Problems related to employment   [] Other Social Determinants of Health:     Shared decision making:  I had a discussion with the patient/family regarding diagnosis, diagnostic results, treatment plan, and medications.  The patient/family indicated understanding of these instructions.  I spent adequate time at the bedside preceding discharge necessary to personally discuss the aftercare instructions, giving patient education, providing explanations of the results of our evaluations/findings, and my decision making to assure that the patient/family understand the plan of care.  Time was allotted to answer questions at that time and throughout the ED course.  Emphasis was placed on timely follow-up after discharge.  I also discussed the potential for the development of an acute emergent condition requiring further evaluation, admission, or even surgical intervention. I discussed that we found nothing during the visit today indicating the need for further  workup, admission, or the presence of an unstable medical condition.  I encouraged the patient to return to the emergency department immediately for ANY concerns, worsening, new complaints, or if symptoms persist and unable to seek follow-up in a timely fashion.  The patient/family expressed understanding and agreement with this plan.  The patient will follow-up with primary care for reevaluation.      Orders placed during this visit:  No orders of the defined types were placed in this encounter.      ED Course:    ED Course as of 04/12/24 0252   Thu Apr 11, 2024 2137 Vitals and Telemetry tracing was reviewed and directly interpreted by myself demonstrating blood pressure 130/97, afebrile, heart rate of 81, respirations of 16 breaths/min and oxygen saturation 99% on room air [JG]   Fri Apr 12, 2024   0250 BP: 130/97 [JG]   0250 Temp: 98 °F (36.7 °C) [JG]   0250 Heart Rate: 81 [JG]   0250 Resp: 16 [JG]   0251 SpO2: 99 % [JG]   0251 In summary this is a well-known 59-year-old homeless patient who presents to the ER today again for complaints of generalized body aches and sore throat.  Seen and evaluated at this facility again multiple outside facilities for similar complaints over the last several months.  No acute or emergent findings demonstrated on physical exam.  Vital signs within normal limits.  Afebrile.  Patient provided a lunch box as well as ibuprofen for her generalized body aches.  Patient also provided dry clothing.  Discharged home with return precautions and outpatient follow-up to primary care. [JG]      ED Course User Index  [JG] Juan Ortiz PA            DIAGNOSIS  Final diagnoses:   Generalized body aches   Homeless       DISPOSITION    ED Disposition       ED Disposition   Discharge    Condition   Stable    Comment   --               Please note that portions of this document were completed with voice recognition software.        Juan Ortiz PA  04/12/24 0252